# Patient Record
Sex: FEMALE | Race: WHITE | Employment: OTHER | URBAN - METROPOLITAN AREA
[De-identification: names, ages, dates, MRNs, and addresses within clinical notes are randomized per-mention and may not be internally consistent; named-entity substitution may affect disease eponyms.]

---

## 2017-01-27 DIAGNOSIS — R00.0 TACHYCARDIA: Primary | ICD-10-CM

## 2017-01-30 RX ORDER — DILTIAZEM HYDROCHLORIDE 240 MG/1
TABLET, EXTENDED RELEASE ORAL
Qty: 90 TABLET | Refills: 0 | Status: SHIPPED | OUTPATIENT
Start: 2017-01-30 | End: 2017-04-06

## 2017-02-06 ENCOUNTER — OFFICE VISIT (OUTPATIENT)
Dept: FAMILY MEDICINE | Facility: OTHER | Age: 47
End: 2017-02-06
Attending: NURSE PRACTITIONER
Payer: COMMERCIAL

## 2017-02-06 VITALS
WEIGHT: 251 LBS | HEIGHT: 65 IN | SYSTOLIC BLOOD PRESSURE: 122 MMHG | DIASTOLIC BLOOD PRESSURE: 74 MMHG | BODY MASS INDEX: 41.82 KG/M2 | HEART RATE: 82 BPM | RESPIRATION RATE: 14 BRPM | TEMPERATURE: 97.7 F

## 2017-02-06 DIAGNOSIS — M54.41 CHRONIC BILATERAL LOW BACK PAIN WITH BILATERAL SCIATICA: Primary | ICD-10-CM

## 2017-02-06 DIAGNOSIS — M51.26 LUMBAR DISC HERNIATION: ICD-10-CM

## 2017-02-06 DIAGNOSIS — M54.42 CHRONIC BILATERAL LOW BACK PAIN WITH BILATERAL SCIATICA: Primary | ICD-10-CM

## 2017-02-06 DIAGNOSIS — G89.29 CHRONIC BILATERAL LOW BACK PAIN WITH BILATERAL SCIATICA: Primary | ICD-10-CM

## 2017-02-06 DIAGNOSIS — M51.26 LUMBAR DISC HERNIATION: Primary | ICD-10-CM

## 2017-02-06 DIAGNOSIS — M54.50 LOW BACK PAIN: ICD-10-CM

## 2017-02-06 PROCEDURE — 99214 OFFICE O/P EST MOD 30 MIN: CPT | Performed by: NURSE PRACTITIONER

## 2017-02-06 NOTE — MR AVS SNAPSHOT
After Visit Summary   2/6/2017    Selma Dubose    MRN: [id number]           Patient Information     Date Of Birth          1970        Visit Information        Provider Department      2/6/2017 10:30 AM Nga Yousif NP [facility] [place]        Today's Diagnoses     Chronic bilateral low back pain with bilateral sciatica    -  1     Lumbar disc herniation           Care Instructions        1. Chronic bilateral low back pain with bilateral sciatica  2. Lumbar disc herniation  - MR Lumbar Spine w/o & w Contrast; Future  - MR Lumbar Spine w/o & w Contrast  - After MRI we can determine next course of action - injection vs neurosurgery consultation (last injection was at [facility], Dr. Conklin)  - Ice  - Anti-inflammatory PRN  - Topical Aspercreme with lidocaine          Nga Yousif -[facility]  [phone]                Follow-ups after your visit        Your next 10 appointments already scheduled     Feb 21, 2017 11:00 AM   (Arrive by 10:45 AM)   SHORT with Paz Maria MD   [facility] [place] ([facility] )    [address] [place] [address]   [phone]              Who to contact     If you have questions or need follow up information about today's clinic visit or your schedule please contact [facility] directly at [phone].  Normal or non-critical lab and imaging results will be communicated to you by MyChart, letter or phone within 4 business days after the clinic has received the results. If you do not hear from us within 7 days, please contact the clinic through MyChart or phone. If you have a critical or abnormal lab result, we will notify you by phone as soon as possible.  Submit refill requests through [employer] or call your pharmacy and they will forward the refill request to us. Please allow 3 business days for your refill to be completed.          Additional Information About Your Visit        MyChart Information     [employer]  "gives you secure access to your electronic health record. If you see a primary care provider, you can also send messages to your care team and make appointments. If you have questions, please call your primary care clinic.  If you do not have a primary care provider, please call [phone] and they will assist you.        Care EveryWhere ID     This is your Care EveryWhere ID. This could be used by other organizations to access your [place] medical records  [id number]        Your Vitals Were     Pulse Temperature Respirations Height BMI (Body Mass Index)       82 97.7  F (36.5  C) (Tympanic) 14 5' 5\" (1.651 m) 41.77 kg/m2        Blood Pressure from Last 3 Encounters:   02/06/17 122/74   10/09/15 130/78   10/09/15 132/80    Weight from Last 3 Encounters:   02/06/17 251 lb (113.853 kg)   10/09/15 232 lb (105.235 kg)   10/09/15 240 lb (108.863 kg)              We Performed the Following     MR Lumbar Spine w/o & w Contrast        Primary Care Provider Office Phone # Fax #    Paz Maria [phone]       [facility] [address] [place] [address]        Thank you!     Thank you for choosing [facility]  for your care. Our goal is always to provide you with excellent care. Hearing back from our patients is one way we can continue to improve our services. Please take a few minutes to complete the written survey that you may receive in the mail after your visit with us. Thank you!             Your Updated Medication List - Protect others around you: Learn how to safely use, store and throw away your medicines at www.disposemymeds.org.          This list is accurate as of: 2/6/17 11:21 AM.  Always use your most recent med list.                   Brand Name Dispense Instructions for use    albuterol 108 (90 BASE) MCG/ACT Inhaler   Generic drug:  albuterol     17 g    INHALE 2 PUFFS INTO THE LUNGS FOUR TIMES DAILY       ALPRAZolam 0.25 MG tablet    XANAX "    30 tablet    Take 1 tablet (0.25 mg) by mouth 3 times daily as needed for anxiety       diclofenac 75 MG EC tablet    VOLTAREN    90 tablet    Take 1 tablet (75 mg) by mouth 2 times daily as needed for moderate pain       levothyroxine 100 MCG tablet    SYNTHROID/LEVOTHROID    30 tablet    TAKE 1 TABLET BY MOUTH EVERY DAY. DUE FOR LAB WORK       MATZIM  MG Tb24   Generic drug:  diltiazem HCl COATED BEADS     90 tablet    TAKE 1 TABLET BY MOUTH DAILY       tiZANidine 4 MG tablet    ZANAFLEX    30 tablet    Take 1 tablet (4 mg) by mouth nightly as needed for muscle spasms

## 2017-02-06 NOTE — PROGRESS NOTES
SUBJECTIVE:  Selma Dubose is a 46 year old female   Chief Complaint   Patient presents with     Recheck Medication     Patient would like a referral for a back injection.       Active diagnoses this visit:  Lumbar spine pain    Onset- years  Timing - daily  Location  - lumbar - sciatica, both legs  Severity  - moderate  Duration - as above  Quality -  Dull, pressure, sharp  Settings  - any  Aggravating Factors -  Weather, sitting, ROM  Relieving factors -  no  Treatment to Date - PT, PMIR, Injection x1, moderately effective               MR OF THE LUMBAR SPINE - 2015     TECHNIQUE:  Images were obtained sagittally T1 STIR and T2-weighted;  axially proton density and T2-weighted.     REPORT:  There is decreased height in the T12-L1 disk, with  broad-based annular bulging.  The L1-L2 disk is normal height, without  evidence of disk herniation or protrusion.  Mild loss of vertical  height is seen at L2-L3 with some mild annular bulging, but no  significant thecal sac or nerve root compression  is noted.  At L3-L4  disk is normal height, without evidence of disk herniation or  protrusion.  There is severe facet joint degenerative changes at  L4-L5.  There is non-spondylolytic spondylolisthesis with forward  slippage of approximately 4 mm of L4 on L5.  There is broad-based  annular bulging, mildly flattening the ventral aspect of the  subarachnoid space.  At L5-S1 there is some broad-based annular  bulging noted.  Facet joints appear normal.  There is posterolateral  disk protrusions bilaterally, worse on the left than the right.  There  is mild compression of the left L5 nerve root in the L5-S1 neural  foramen.  There is no significant compression of the right L5 nerve  root.  Intradurally, the nerves of cauda equina and conus appear  normal. The paravertebral soft tissues appear normal.     IMPRESSION:  1.  SYMMETRIC DISK PROTRUSION FAR-LATERALLY L5-S1 ON THE LEFT,  COMPRESSING THE LEFT L5 NERVE ROOT IN THE NEURAL  FORAMEN.     2.  ADVANCED FACET JOINT DEGENERATIVE CHANGES L4-L5 WITH  NON-SPONDYLOLYTIC SPONDYLOLISTHESIS OF L4 ON L5.              Past Medical History   Diagnosis Date     Unspecified acquired hypothyroidism 07/27/2005     Nonallopathic lesion of cervical region, not elsewhere classified 08/18/2004     Chilari Malformation 12/09/2011     Brain aneurysm  12/09/2011     Hand pain, Left 12/09/2011     Anxiety      Obstructive sleep apnea 5/7/2013     Noncompliance with medication regimen 3/17/2015       Past Surgical History   Procedure Laterality Date     Clipping of right cerebral artery aneurysm       cerebral artery aneurysm,nelida type 1 syndrome,     Carpal tunnel release rt/lt       LEFT       Family History   Problem Relation Age of Onset     Alzheimer Disease Other      CANCER Other      LUNG     Other - See Comments Father 54     Accidental Head Injury     Respiratory Other      Emphysema     DIABETES Other      CANCER Other      LUNG       Social History   Substance Use Topics     Smoking status: Current Every Day Smoker -- 1.00 packs/day for 24 years     Types: Cigarettes     Smokeless tobacco: Never Used      Comment: Tried to Quit (NO); Passive Exposure (NO)     Alcohol Use: Yes      Comment: 3 Beers - WEEKLY       Current Outpatient Prescriptions   Medication     MATZIM  MG TB24     ALPRAZolam (XANAX) 0.25 MG tablet     levothyroxine (SYNTHROID,LEVOTHROID) 100 MCG tablet     ALBUTEROL 108 (90 BASE) MCG/ACT inhaler     diclofenac (VOLTAREN) 75 MG EC tablet     tiZANidine (ZANAFLEX) 4 MG tablet     No current facility-administered medications for this visit.        No Known Allergies    REVIEW OF SYSTEMS  Skin: negative  Eyes: negative  Ears/Nose/Throat: negative  Respiratory: No shortness of breath, dyspnea on exertion, cough, or hemoptysis  Cardiovascular: negative  Gastrointestinal: negative  Genitourinary: negative  Musculoskeletal: as above  Neurologic: negative  Psychiatric:  "negative  Hematologic/Lymphatic/Immunologic: negative      OBJECTIVE:  /74 mmHg  Pulse 82  Temp(Src) 97.7  F (36.5  C) (Tympanic)  Resp 14  Ht 5' 5\" (1.651 m)  Wt 251 lb (113.853 kg)  BMI 41.77 kg/m2  Constitutional: healthy, alert, no distress and cooperative  Head: Normocephalic. No masses, lesions, or tenderness  Neck: Neck supple. No adenopathy. Thyroid symmetric.  ENT: ENT exam unremarkable  Cardiovascular: PMI normal. No murmurs, clicks gallops or rub  Respiratory: negative, Percussion normal. Good diaphragmatic excursion. Lungs clear  Gastrointestinal: Abdomen soft, non-tender. BS normal. No masses, organomegaly  Musculoskeletal:  L/S spine pain - SI pain bilaterally.  Pain with ROM  Skin: no suspicious lesions or rashes  Neurologic: Gait normal. Sensation grossly WNL.  Psychiatric: mentation appears normal and affect normal/bright  Hematologic/Lymphatic/Immunologic: No adenopathy noted    Visit time:  25 Minutes  Time spent with patient, including examination, face to face patient education - 20mn  Visit Content: During our face to face time, patient education was provided regarding diagnosis, and treatment pan. Patient counseled regarding disease process  All diagnosis and treatment plan as above are reviewed with the patient.  Record review completed      1. Chronic bilateral low back pain with bilateral sciatica  2. Lumbar disc herniation  - MR Lumbar Spine w/o & w Contrast; Future  - MR Lumbar Spine w/o & w Contrast  - After MRI we can determine next course of action - injection vs neurosurgery consultation (last injection was at [facility], Dr. Conklin)  - Ice  - Anti-inflammatory PRN  - Topical Aspercreme with lidocaine          Nga GRAHAM  [phone]          "

## 2017-02-06 NOTE — NURSING NOTE
"Chief Complaint   Patient presents with     Recheck Medication     Patient would like a referral for a back injection.       Initial /74 mmHg  Pulse 82  Temp(Src) 97.7  F (36.5  C) (Tympanic)  Resp 14  Ht 5' 5\" (1.651 m)  Wt 251 lb (113.853 kg)  BMI 41.77 kg/m2 Estimated body mass index is 41.77 kg/(m^2) as calculated from the following:    Height as of this encounter: 5' 5\" (1.651 m).    Weight as of this encounter: 251 lb (113.853 kg).  Medication Reconciliation: complete   Luz Eldridge      "

## 2017-02-13 DIAGNOSIS — F41.9 ANXIETY: ICD-10-CM

## 2017-02-14 RX ORDER — ALPRAZOLAM 0.25 MG
TABLET ORAL
Qty: 30 TABLET | Refills: 2 | Status: SHIPPED | OUTPATIENT
Start: 2017-02-14 | End: 2017-03-16

## 2017-02-21 ENCOUNTER — OFFICE VISIT (OUTPATIENT)
Dept: FAMILY MEDICINE | Facility: OTHER | Age: 47
End: 2017-02-21
Attending: FAMILY MEDICINE
Payer: COMMERCIAL

## 2017-02-21 ENCOUNTER — HOSPITAL ENCOUNTER (OUTPATIENT)
Dept: MRI IMAGING | Facility: HOSPITAL | Age: 47
Discharge: HOME | End: 2017-02-21
Attending: NURSE PRACTITIONER | Admitting: NURSE PRACTITIONER
Payer: COMMERCIAL

## 2017-02-21 VITALS
DIASTOLIC BLOOD PRESSURE: 80 MMHG | RESPIRATION RATE: 15 BRPM | BODY MASS INDEX: 41.93 KG/M2 | HEART RATE: 93 BPM | WEIGHT: 252 LBS | TEMPERATURE: 97.7 F | SYSTOLIC BLOOD PRESSURE: 120 MMHG | OXYGEN SATURATION: 97 %

## 2017-02-21 DIAGNOSIS — G89.29 CHRONIC RIGHT SHOULDER PAIN: ICD-10-CM

## 2017-02-21 DIAGNOSIS — R00.0 TACHYCARDIA: ICD-10-CM

## 2017-02-21 DIAGNOSIS — F41.9 ANXIETY: ICD-10-CM

## 2017-02-21 DIAGNOSIS — E03.9 HYPOTHYROIDISM, UNSPECIFIED TYPE: Primary | ICD-10-CM

## 2017-02-21 DIAGNOSIS — M25.511 CHRONIC RIGHT SHOULDER PAIN: ICD-10-CM

## 2017-02-21 LAB
ANION GAP SERPL CALCULATED.3IONS-SCNC: 10 MMOL/L (ref 3–14)
BUN SERPL-MCNC: 9 MG/DL (ref 7–30)
CALCIUM SERPL-MCNC: 8.6 MG/DL (ref 8.5–10.1)
CHLORIDE SERPL-SCNC: 105 MMOL/L (ref 94–109)
CO2 SERPL-SCNC: 24 MMOL/L (ref 20–32)
CREAT SERPL-MCNC: 0.75 MG/DL (ref 0.52–1.04)
GFR SERPL CREATININE-BSD FRML MDRD: 82 ML/MIN/1.7M2
GLUCOSE SERPL-MCNC: 108 MG/DL (ref 70–99)
POTASSIUM SERPL-SCNC: 4.3 MMOL/L (ref 3.4–5.3)
SODIUM SERPL-SCNC: 139 MMOL/L (ref 133–144)
TSH SERPL DL<=0.05 MIU/L-ACNC: 6.16 MU/L (ref 0.4–4)

## 2017-02-21 PROCEDURE — 36415 COLL VENOUS BLD VENIPUNCTURE: CPT | Performed by: FAMILY MEDICINE

## 2017-02-21 PROCEDURE — 84443 ASSAY THYROID STIM HORMONE: CPT | Performed by: FAMILY MEDICINE

## 2017-02-21 PROCEDURE — 72148 MRI LUMBAR SPINE W/O DYE: CPT | Mod: TC

## 2017-02-21 PROCEDURE — 99214 OFFICE O/P EST MOD 30 MIN: CPT | Performed by: FAMILY MEDICINE

## 2017-02-21 PROCEDURE — 80048 BASIC METABOLIC PNL TOTAL CA: CPT | Performed by: FAMILY MEDICINE

## 2017-02-21 RX ORDER — DICLOFENAC SODIUM 75 MG/1
75 TABLET, DELAYED RELEASE ORAL 2 TIMES DAILY PRN
Qty: 90 TABLET | Refills: 1 | Status: SHIPPED | OUTPATIENT
Start: 2017-02-21 | End: 2017-04-28

## 2017-02-21 NOTE — MR AVS SNAPSHOT
After Visit Summary   2/21/2017    Selma Dubose    MRN: [id number]           Patient Information     Date Of Birth          1970        Visit Information        Provider Department      2/21/2017 11:00 AM Paz Maria MD [facility]        Today's Diagnoses     Hypothyroidism, unspecified type    -  1    Anxiety        Chronic right shoulder pain        Tachycardia           Follow-ups after your visit        Follow-up notes from your care team     Return in about 6 months (around 8/21/2017).      Who to contact     If you have questions or need follow up information about today's clinic visit or your schedule please contact [facility] directly at [phone].  Normal or non-critical lab and imaging results will be communicated to you by MyChart, letter or phone within 4 business days after the clinic has received the results. If you do not hear from us within 7 days, please contact the clinic through [employer] or phone. If you have a critical or abnormal lab result, we will notify you by phone as soon as possible.  Submit refill requests through [employer] or call your pharmacy and they will forward the refill request to us. Please allow 3 business days for your refill to be completed.          Additional Information About Your Visit        MyChart Information     [employer] gives you secure access to your electronic health record. If you see a primary care provider, you can also send messages to your care team and make appointments. If you have questions, please call your primary care clinic.  If you do not have a primary care provider, please call [phone] and they will assist you.        Care EveryWhere ID     This is your Care EveryWhere ID. This could be used by other organizations to access your [place] medical records  [id number]        Your Vitals Were     Pulse Temperature Respirations Pulse Oximetry BMI (Body Mass Index)       93 97.7  F (36.5  C)  (Tympanic) 15 97% 41.93 kg/m2        Blood Pressure from Last 3 Encounters:   02/21/17 120/80   02/06/17 122/74   10/09/15 130/78    Weight from Last 3 Encounters:   02/21/17 252 lb (114.3 kg)   02/06/17 251 lb (113.9 kg)   10/09/15 232 lb (105.2 kg)              We Performed the Following     Basic metabolic panel     TSH          Today's Medication Changes          These changes are accurate as of: 2/21/17 11:59 PM.  If you have any questions, ask your nurse or doctor.               These medicines have changed or have updated prescriptions.        Dose/Directions    levothyroxine 112 MCG tablet   Commonly known as:  SYNTHROID/LEVOTHROID   This may have changed:  See the new instructions.   Used for:  Hypothyroidism, unspecified type   Changed by:  Paz Maria MD        Dose:  112 mcg   Take 1 tablet (112 mcg) by mouth daily   Quantity:  90 tablet   Refills:  1            Where to get your medicines      These medications were sent to [facility] [address] [place] [address]  AT [facility] [address]  [address] [facility] [address]     Phone:  [phone]     diclofenac 75 MG EC tablet    levothyroxine 112 MCG tablet                Primary Care Provider Office Phone # Fax #    Paz Maria [phone]       [facility] [address] [facility] [address]        Thank you!     Thank you for choosing [facility]  for your care. Our goal is always to provide you with excellent care. Hearing back from our patients is one way we can continue to improve our services. Please take a few minutes to complete the written survey that you may receive in the mail after your visit with us. Thank you!             Your Updated Medication List - Protect others around you: Learn how to safely use, store and throw away your medicines at www.disposemymeds.org.          This list is accurate as of: 2/21/17 11:59 PM.  Always use  your most recent med list.                   Brand Name Dispense Instructions for use    albuterol 108 (90 BASE) MCG/ACT Inhaler   Generic drug:  albuterol     17 g    INHALE 2 PUFFS INTO THE LUNGS FOUR TIMES DAILY       ALPRAZolam 0.25 MG tablet    XANAX    30 tablet    TAKE 1 TABLET BY MOUTH THREE TIMES DAILY AS NEEDED FOR ANXIETY       diclofenac 75 MG EC tablet    VOLTAREN    90 tablet    Take 1 tablet (75 mg) by mouth 2 times daily as needed for moderate pain       levothyroxine 112 MCG tablet    SYNTHROID/LEVOTHROID    90 tablet    Take 1 tablet (112 mcg) by mouth daily       MATZIM  MG Tb24   Generic drug:  diltiazem HCl COATED BEADS     90 tablet    TAKE 1 TABLET BY MOUTH DAILY       tiZANidine 4 MG tablet    ZANAFLEX    30 tablet    Take 1 tablet (4 mg) by mouth nightly as needed for muscle spasms

## 2017-02-21 NOTE — NURSING NOTE
"Chief Complaint   Patient presents with     Thyroid Problem     side effects of generic synthroid needs name brand     Anxiety     Musculoskeletal Problem     med review       Initial /80 (BP Location: Left arm, Patient Position: Chair, Cuff Size: Adult Large)  Pulse 93  Temp 97.7  F (36.5  C) (Tympanic)  Resp 15  Wt 252 lb (114.3 kg)  SpO2 97%  BMI 41.93 kg/m2 Estimated body mass index is 41.93 kg/(m^2) as calculated from the following:    Height as of 2/6/17: 5' 5\" (1.651 m).    Weight as of this encounter: 252 lb (114.3 kg).  Medication Reconciliation: complete     Milvia Robbins      "

## 2017-02-22 ENCOUNTER — TELEPHONE (OUTPATIENT)
Dept: FAMILY MEDICINE | Facility: OTHER | Age: 47
End: 2017-02-22

## 2017-02-22 DIAGNOSIS — R93.7 ABNORMAL MRI, LUMBAR SPINE: Primary | ICD-10-CM

## 2017-02-22 DIAGNOSIS — M54.40 BILATERAL LOW BACK PAIN WITH SCIATICA, SCIATICA LATERALITY UNSPECIFIED, UNSPECIFIED CHRONICITY: ICD-10-CM

## 2017-02-24 RX ORDER — LEVOTHYROXINE SODIUM 112 UG/1
112 TABLET ORAL DAILY
Qty: 90 TABLET | Refills: 1 | Status: SHIPPED | OUTPATIENT
Start: 2017-02-24 | End: 2017-10-16

## 2017-02-24 NOTE — PROGRESS NOTES
SUBJECTIVE:                                                    Selma Dubose is a 46 year old female who presents to clinic today for the following health issues:    Anxiety Follow-Up    Status since last visit: Improved since she started working from home    Other associated symptoms:None    Complicating factors:   Significant life event: No   Current substance abuse: None  Depression symptoms: No  No flowsheet data found.     GAD7   No flowsheet data found.       PHQ-9 SCORE 5/30/2013 7/31/2015   Total Score 12 16        Hypothyroidism Follow-up      Since last visit, patient describes the following symptoms: Weight stable, no hair loss, no skin changes, no constipation, no loose stools       Patient does also need refills of NSAID and labs updated.        Problem list and histories reviewed & adjusted, as indicated.  Additional history: as documented    Patient Active Problem List   Diagnosis     Hypothyroidism     Anxiety     Tachycardia     Obstructive sleep apnea     Tobacco dependence     Spina bifida with hydrocephalus (H)     Brain aneurysm      Worsening headaches     Noncompliance with medication regimen     Past Surgical History   Procedure Laterality Date     Clipping of right cerebral artery aneurysm       cerebral artery aneurysm,nelida type 1 syndrome,     Carpal tunnel release rt/lt       LEFT       Social History   Substance Use Topics     Smoking status: Current Every Day Smoker     Packs/day: 1.00     Years: 24.00     Types: Cigarettes     Smokeless tobacco: Never Used      Comment: Tried to Quit (NO); Passive Exposure (NO)     Alcohol use Yes      Comment: 3 Beers - WEEKLY     Family History   Problem Relation Age of Onset     Other - See Comments Father 54     Accidental Head Injury     Alzheimer Disease Other      DIABETES Other      CANCER Other      LUNG     Respiratory Other      Emphysema     CANCER Other      LUNG         Current Outpatient Prescriptions   Medication Sig Dispense  Refill     levothyroxine (SYNTHROID/LEVOTHROID) 112 MCG tablet Take 1 tablet (112 mcg) by mouth daily 90 tablet 1     diclofenac (VOLTAREN) 75 MG EC tablet Take 1 tablet (75 mg) by mouth 2 times daily as needed for moderate pain 90 tablet 1     ALPRAZolam (XANAX) 0.25 MG tablet TAKE 1 TABLET BY MOUTH THREE TIMES DAILY AS NEEDED FOR ANXIETY 30 tablet 2     MATZIM  MG TB24 TAKE 1 TABLET BY MOUTH DAILY 90 tablet 0     ALBUTEROL 108 (90 BASE) MCG/ACT inhaler INHALE 2 PUFFS INTO THE LUNGS FOUR TIMES DAILY 17 g 2     tiZANidine (ZANAFLEX) 4 MG tablet Take 1 tablet (4 mg) by mouth nightly as needed for muscle spasms 30 tablet 1     [DISCONTINUED] levothyroxine (SYNTHROID,LEVOTHROID) 100 MCG tablet TAKE 1 TABLET BY MOUTH EVERY DAY. DUE FOR LAB WORK 30 tablet 0     No Known Allergies    ROS:  Constitutional, HEENT, cardiovascular, pulmonary, gi and gu systems are negative, except as otherwise noted.    OBJECTIVE:                                                    /80 (BP Location: Left arm, Patient Position: Chair, Cuff Size: Adult Large)  Pulse 93  Temp 97.7  F (36.5  C) (Tympanic)  Resp 15  Wt 252 lb (114.3 kg)  SpO2 97%  BMI 41.93 kg/m2  Body mass index is 41.93 kg/(m^2).  GENERAL: healthy, alert and no distress  PSYCH: mentation appears normal, affect normal/bright    Diagnostic Test Results:  none      ASSESSMENT/PLAN:                                                      1. Hypothyroidism, unspecified type  Labs updated and refills sent.  Follow-up in six months, sooner as directed.  - TSH  - levothyroxine (SYNTHROID/LEVOTHROID) 112 MCG tablet; Take 1 tablet (112 mcg) by mouth daily  Dispense: 90 tablet; Refill: 1    2. Anxiety  No changes at this time.    3. Chronic right shoulder pain  Refilled.  - diclofenac (VOLTAREN) 75 MG EC tablet; Take 1 tablet (75 mg) by mouth 2 times daily as needed for moderate pain  Dispense: 90 tablet; Refill: 1    4. Tachycardia  Refilled.  - Basic metabolic panel        Paz  St Papa MD  [facility]

## 2017-03-13 DIAGNOSIS — F41.9 ANXIETY: ICD-10-CM

## 2017-03-14 RX ORDER — ALPRAZOLAM 0.25 MG
TABLET ORAL
Qty: 30 TABLET | Refills: 0 | OUTPATIENT
Start: 2017-03-14

## 2017-04-06 DIAGNOSIS — R00.0 TACHYCARDIA: ICD-10-CM

## 2017-04-06 NOTE — TELEPHONE ENCOUNTER
MATZIM      Last Written Prescription Date:  1/30/17  Last Fill Quantity: 90,   # refills: 0  Last Office Visit with [facility] prescribing provider: 2/21/17  Future Office visit:       Routing refill request to provider for review/approval because:  Drug not on the [facility] refill protocol or controlled substance

## 2017-04-14 DIAGNOSIS — R00.0 TACHYCARDIA: ICD-10-CM

## 2017-04-14 NOTE — TELEPHONE ENCOUNTER
Kylezim      Last Written Prescription Date: 4/6/17  Last Fill Quantity: 90,  # refills: 0   Last Office Visit with [facility] or [facility] prescribing provider: 2/21/17

## 2017-04-28 ENCOUNTER — OFFICE VISIT (OUTPATIENT)
Dept: FAMILY MEDICINE | Facility: OTHER | Age: 47
End: 2017-04-28
Attending: NURSE PRACTITIONER
Payer: COMMERCIAL

## 2017-04-28 VITALS
WEIGHT: 220 LBS | SYSTOLIC BLOOD PRESSURE: 124 MMHG | HEART RATE: 80 BPM | RESPIRATION RATE: 14 BRPM | TEMPERATURE: 98.2 F | BODY MASS INDEX: 36.65 KG/M2 | DIASTOLIC BLOOD PRESSURE: 78 MMHG | HEIGHT: 65 IN

## 2017-04-28 DIAGNOSIS — M54.42 MIDLINE LOW BACK PAIN WITH LEFT-SIDED SCIATICA, UNSPECIFIED CHRONICITY: Primary | ICD-10-CM

## 2017-04-28 DIAGNOSIS — M48.061 SPINAL STENOSIS OF LUMBAR REGION: ICD-10-CM

## 2017-04-28 DIAGNOSIS — M47.26 OSTEOARTHRITIS OF SPINE WITH RADICULOPATHY, LUMBAR REGION: ICD-10-CM

## 2017-04-28 DIAGNOSIS — M54.17 LUMBOSACRAL RADICULOPATHY AT S1: ICD-10-CM

## 2017-04-28 PROBLEM — M54.40 ACUTE MIDLINE LOW BACK PAIN WITH SCIATICA: Status: ACTIVE | Noted: 2017-04-28

## 2017-04-28 PROCEDURE — 99214 OFFICE O/P EST MOD 30 MIN: CPT | Performed by: NURSE PRACTITIONER

## 2017-04-28 RX ORDER — CYCLOBENZAPRINE HCL 10 MG
10 TABLET ORAL 2 TIMES DAILY PRN
Qty: 60 TABLET | Refills: 1 | Status: SHIPPED | OUTPATIENT
Start: 2017-04-28 | End: 2017-08-14

## 2017-04-28 RX ORDER — IBUPROFEN 600 MG/1
600 TABLET, FILM COATED ORAL EVERY 8 HOURS PRN
Qty: 90 TABLET | Refills: 1 | Status: SHIPPED | OUTPATIENT
Start: 2017-04-28 | End: 2017-08-03

## 2017-04-28 ASSESSMENT — PAIN SCALES - GENERAL: PAINLEVEL: EXTREME PAIN (8)

## 2017-04-28 NOTE — PATIENT INSTRUCTIONS
1. Midline low back pain with left-sided sciatica, unspecified chronicity  - cyclobenzaprine (FLEXERIL) 10 MG tablet; Take 1 tablet (10 mg) by mouth 2 times daily as needed for muscle spasms  Dispense: 60 tablet; Refill: 1  - acetaminophen-codeine (TYLENOL #3) 300-30 MG per tablet; 1-2 po TID PRN  Dispense: 60 tablet; Refill: 1  - ibuprofen (ADVIL/MOTRIN) 600 MG tablet; Take 1 tablet (600 mg) by mouth every 8 hours as needed for moderate pain  Dispense: 90 tablet; Refill: 1    2. Osteoarthritis of spine with radiculopathy, lumbar region  - cyclobenzaprine (FLEXERIL) 10 MG tablet; Take 1 tablet (10 mg) by mouth 2 times daily as needed for muscle spasms  Dispense: 60 tablet; Refill: 1  - acetaminophen-codeine (TYLENOL #3) 300-30 MG per tablet; 1-2 po TID PRN  Dispense: 60 tablet; Refill: 1  - ibuprofen (ADVIL/MOTRIN) 600 MG tablet; Take 1 tablet (600 mg) by mouth every 8 hours as needed for moderate pain  Dispense: 90 tablet; Refill: 1    3. Lumbosacral radiculopathy at S1  - cyclobenzaprine (FLEXERIL) 10 MG tablet; Take 1 tablet (10 mg) by mouth 2 times daily as needed for muscle spasms  Dispense: 60 tablet; Refill: 1  - acetaminophen-codeine (TYLENOL #3) 300-30 MG per tablet; 1-2 po TID PRN  Dispense: 60 tablet; Refill: 1  - ibuprofen (ADVIL/MOTRIN) 600 MG tablet; Take 1 tablet (600 mg) by mouth every 8 hours as needed for moderate pain  Dispense: 90 tablet; Refill: 1    4. Spinal stenosis of lumbar region  - cyclobenzaprine (FLEXERIL) 10 MG tablet; Take 1 tablet (10 mg) by mouth 2 times daily as needed for muscle spasms  Dispense: 60 tablet; Refill: 1  - acetaminophen-codeine (TYLENOL #3) 300-30 MG per tablet; 1-2 po TID PRN  Dispense: 60 tablet; Refill: 1  - ibuprofen (ADVIL/MOTRIN) 600 MG tablet; Take 1 tablet (600 mg) by mouth every 8 hours as needed for moderate pain  Dispense: 90 tablet; Refill: 1        Ice or heat for comfort  Epsom salt baths  Asper cream with lidocaine OTC per package instruction  DC  diclofenac  DC norflex  To UC or ER with increased symptoms  See neurosurgery as arranged  FU here as needed         [facility]  [phone]

## 2017-04-28 NOTE — PROGRESS NOTES
SUBJECTIVE:  Selma Dubose is a 46 year old female   Chief Complaint   Patient presents with     Back Pain     Upcoming neurosurgery       Active diagnoses this visit:  Lumbar spine pain, upcoming surgery anticipated at [facility] in [place], date to be confirmed for consultation and further management.     Onset- increasing over 1 year  Timing - daily  Location  - lumbar, radiates to LLE  Severity  - severe  Duration - as above  Quality -  Sharp, aching  Settings  - all, increases with extended sitting. Prolonged standing, Weakness LLE  Aggravating Factors -  Back stiffness, increased pain with walking and sitting  Relieving factors -  Not to date  Treatment to Date - medication, PT, Steroid injection, stretches, Ice, heat    Failed PT and other non-surgical interventions, and symptoms continue to increase.      She has seen Dr Rodriguez, neurosurgeon, who definitely feels she is a surgical candidate, however her hospital stay and care with him would be out of network.          Please see attached evaluation note from Dr. Rodriguez dated 3/30/17      Prateek Rodriguez MD - 03/30/2017 11:00 AM CDT  Formatting of this note may be different from the original.  Neurosurgery Consult   [facility]  3/30/2017  STEF Norton, CNP     Reason for Consult: Low back pain  Referring provider: Nga Yousif     HPI: Selma Dubose is a 46-year-old female who was referred to the neurosurgery office further evaluation and treatment of the above complaints. I am seeing her in collaboration with Dr. Elvis Rodriguez who also interviewed and examined the patient.  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 Symptoms have acutely worsened and now involves both legs to the knee over the past year.  Location: Across the low back and into both groin and down the upper leg to the knee.  Quality: Very  sharp  Radiating symptoms: Weakness of the legs worse on left  Aggravating Factors: All activity  Relieving Factors: Patient got 6 months of relief from epidural steroid injection  Treatments previously tried: Physical therapy, epidural steroid injection, Advil, heat and ice  Impact on activities of daily living: Ginger cannot walk for more than 20 minutes or stand for more than 5. She can sit for about an hour but all activities are made difficult due to the pain and weakness.    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     Cerebral aneurysm, nonruptured 04/28/2005   S/P Surgery     Hypothyroidism 11/26/2004   Diagnosed in Fall of 2004     Panic disorder without agoraphobia 10/29/2004     Thyrotoxicosis without mention of goiter or other cause, without mention of thyrotoxic crisis or storm 11/26/2004     Unspecified hearing loss 01/03/2005   Sensorineural , right ear     Past Surgical History  Past Surgical History:   Procedure Laterality Date     ANEURYSM, INTRACRAN, SIMPLE SURG 01/24/2005   Clipping of the midle cerebral artery aneurysm, right side     ECG MONITOR/COMPLETE 12/06/2004     Medications  Current Outpatient Prescriptions   Medication Sig     clindamycin (CLEOCIN-T) 1 % lotion APPLY TOPICALLY TWICE DAILY TO PREVENT OUTBREAKS     levothyroxine (SYNTHROID) 100 MCG tablet Take 1 Tab by mouth one time a day.     mupirocin (BACTROBAN) 2 % ointment Apply topically two times a day. To really crusted areas     minocycline (MINOCIN, DYNACIN) 100 MG capsule Take 1 Cap by mouth two times a day. Take with adequate amounts of water.     albuterol HFA (VENTOLIN HFA) 108 (90 BASE) MCG/ACT IN AERS 2 puffs four times daily as needed     ALPRAZolam (XANAX) 0.25 MG OR TABS take one tablet daily as needed     DILTIAZEM HCL COATED BEADS 240 MG OR CP24 1 capsule once daily     No current  "facility-administered medications for this visit.     Allergies  No Known Allergies    Social History  Social History   Substance Use Topics     Smoking status: Current Every Day Smoker   Packs/day: 1.00   Years: 16.00   Types: Cigarettes     Smokeless tobacco: None     Alcohol use 0.0 oz/week   0 Standard drinks or equivalent per week   Comment: occas     Family History  Family History   Problem Relation Age of Onset     Musculo-skeletal Disease Mother   Arthritis     Review of Systems  Skin: no complaints  Eyes: no complaints  Ears/Nose/Throat: no complaints  Respiratory: no complaints  Cardiovascular: no complaints  Gastrointestinal: no complaints  Genitourinary: no complaints  Musculoskeletal: As per history of present illness. Selma also states that she has \"bad knees\" and also impacted difficulty she has with walking.  Neurologic: As per history of present illness  Psychiatric: no complaints  Hematologic/Lymphatic/Immunologic: no complaints  Endocrine: no complaints    Vital Signs  Vitals:   03/30/17 1101   BP: 128/76   Pulse: 80   Height: 1.6 m (5' 3\")   Weight: 110.2 kg (243 lb)   BMI (Calculated): 43.05     Physical Exam:  APPEARANCE: alert and in no apparent distress.  BACK/SPINE: . No spinal tenderness over lumbar vertebrae or paraspinal muscle spasm/tenderness evident.  MOTOR: Strength of right lower extremity 4/5 and left lower extremity 5 over 5  EXTREMITIES: normal  NEUROLOGIC: DTRs 2+, decreased sensation L4 dermatome on the right and S1 dermatome on the left.    Imaging  I have personally reviewed and discussed in collaboration with Dr. Rashawn Rodriguez results the MRI and flexion and extension x-rays of the lumbar spine. I also reviewed the films with Selma. The MRI showed central canal stenosis at L4 5 and left neuroforaminal stenosis at L5-S1 due to degenerative change and large and a disc osteophyte complex respectively. The flexion and extension x-rays did show significant movement at the L4 5 " level.    Assessment/Plan  (M48.06) Spinal stenosis of lumbar region with neurogenic claudication (primary encounter diagnosis)  (M43.16) Spondylolisthesis of lumbar region    Dr. Rodriguez spoke with change or has offered her an L4 5 and L5-S1 TLIF for decompression of the lumbar spine patient has agreed to proceed. Please see surgeons detailed treatment no for discussion.     STEF Norton, CNP     I have seen and examined Selma with MARIELY Field and agree with her history and physical as stated. The patient has back and leg pain consistent with an L5 radicular pattern. MRI reveals L4-5 spondylolisthesis with canal stenosis and severe degeneration of the disc at L5-S1 resulting in bilateral foraminal narrowing. I have offered the patient an L4-5 and L5-S1 tlif. Risks discussed include bleeding, infection, damage to the nerves/cord, csf leak, death, etc. 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     Hand pain, Left 12/09/2011     Lumbosacral radiculopathy at S1 4/28/2017     Nonallopathic lesion of cervical region, not elsewhere classified 08/18/2004     Noncompliance with medication regimen 3/17/2015     Obstructive sleep apnea 5/7/2013     Osteoarthritis of spine with radiculopathy, lumbar region 4/28/2017     Spinal stenosis of lumbar region 4/28/2017     Unspecified acquired hypothyroidism 07/27/2005       Past Surgical History:   Procedure Laterality Date     CARPAL TUNNEL RELEASE RT/LT      LEFT     clipping of right cerebral artery aneurysm      cerebral artery aneurysm,nelida type 1 syndrome,       Family History   Problem Relation Age of Onset     Other - See Comments Father 54     Accidental Head Injury     Alzheimer Disease Other      DIABETES Other      CANCER Other      LUNG     Respiratory Other      Emphysema     CANCER Other      LUNG       Social  "History   Substance Use Topics     Smoking status: Current Every Day Smoker     Packs/day: 1.00     Years: 24.00     Types: Cigarettes     Smokeless tobacco: Never Used      Comment: Tried to Quit (NO); Passive Exposure (NO)     Alcohol use Yes      Comment: 3 Beers - WEEKLY       Current Outpatient Prescriptions   Medication     cyclobenzaprine (FLEXERIL) 10 MG tablet     acetaminophen-codeine (TYLENOL #3) 300-30 MG per tablet     ibuprofen (ADVIL/MOTRIN) 600 MG tablet     diltiazem HCl COATED BEADS (MATZIM LA) 240 MG TB24     ALPRAZolam (XANAX) 0.25 MG tablet     levothyroxine (SYNTHROID/LEVOTHROID) 112 MCG tablet     ALBUTEROL 108 (90 BASE) MCG/ACT inhaler     No current facility-administered medications for this visit.         No Known Allergies    REVIEW OF SYSTEMS  Skin: negative  Eyes: negative  Ears/Nose/Throat: negative  Respiratory: No shortness of breath, dyspnea on exertion, cough, or hemoptysis  Cardiovascular: negative  Gastrointestinal: negative  Genitourinary: negative  Musculoskeletal: as above  Neurologic: LLE radiculopathy  Psychiatric: anxiety  Hematologic/Lymphatic/Immunologic: negative      OBJECTIVE:  /78 (BP Location: Right arm, Patient Position: Chair, Cuff Size: Adult Regular)  Pulse 80  Temp 98.2  F (36.8  C) (Oral)  Resp 14  Ht 5' 5\" (1.651 m)  Wt 220 lb (99.8 kg)  LMP 04/02/2017  BMI 36.61 kg/m2     Constitutional: healthy, alert, cooperative, but uncomfortable  Neck: Neck supple. No adenopathy. Thyroid symmetric.  ENT: ENT exam unremarkable  Cardiovascular: PMI normal. No murmurs, clicks gallops or rub  Respiratory: negative, Percussion normal. Good diaphragmatic excursion. Lungs clear  Musculoskeletal: LS paraspinal tenderness, spasm - left SI joint pain, radiates to foot   Skin: no suspicious lesions or rashes  Neurologic: Gait normal. Sensation grossly WNL.  Psychiatric: mentation appears normal and affect normal/bright other than frustration due to discomfort, " understandably    XR LUMBAR SP FLEX EXT OR BEND VIEWS ONLY3/30/2017  [facility]  Result Narrative   This document is currently in Final Status    Exam Accession# [id number]    XR LUMBAR SP FLEX EXT OR BEND VIEWS ONLY    HISTORY: LBP.     COMPARISON: 01/27/2016.     IMPRESSION: 1 cm spondylolisthesis of L4 on L5 with flexion which partially reduces to 7 mm with extension. No additional subluxation with flexion and extension positioning. Advanced L5-S1 disc degeneration noted. Advanced lower lumbar facet degenerative   changes.       Dictated By: Keyon Alfaro MD 3/30/2017 1:27 PM  Edited By: CA 3/30/2017 1:31 PM    Electronically Signed: Keyon Alfaro MD 3/30/2017 5:04 PM            MRI OF LUMBAR SPINE     CLINICAL HISTORY: A 46-year-old female with chronic history of low  back pain and intermittent right radiculopathy.     COMPARISON: Today's study is compared to a prior MRI of the lumbar  spine which is dated November 4, 2015.     FINDINGS: Grade 1 degenerative subluxation of L4 relative to L5 is  similar in severity.     Conus medullaris is seen at T12-L1 and is normal. Mild annular  bulging within the disk at T11-T12 and T12-L1 are similar.     L1-L2 again demonstrates a normally contoured disk with minimal  endplate and facet joint degeneration.     L2-L3: Mild disk degeneration with minimal endplate and facet joint  degenerative changes.     L3-L4: Mild disk desiccation with slight bulge, which are similar.  Small synovial cyst is again seen along the dorsomedial aspect of the  right facet joint.     L4-L5: Moderate disk degeneration with slight chronic bulge. Severe  facet joint degenerative changes result in grade 1 subluxation of L4  relative to L5. The neural foramina are moderately narrowed,  asymmetrically worse on the left, with left L4 ganglionic  encroachment. The central canal is also mildly narrowed.     L5-S1: Severe disk degeneration with chronic bulge and endplate  osteophytic spurring are  again seen. Left dorsolateral disk  osteophyte complex appears to be increased slightly in size compared  to the prior examination, contributing to left L5 ganglionic abutment.  Annular fissuring is again seen along the left parasagittal aspect of  the disk. There is slight degenerative change seen about the facet  joints.     Mild atrophy of the paraspinous musculature is similar in appearance.  Cortical cyst of the right kidney is also similar.     IMPRESSION:    1. SLIGHT WORSENING OF SEVERE BILATERAL FACET JOINT DEGENERATION AT  L4-L5, AGAIN CONTRIBUTING TO DEGENERATIVE SUBLUXATION OF L4 RELATIVE  TO L5, IN ADDITION TO MILD TO MODERATE BILATERAL FORAMINAL STENOSIS.  NO EVIDENCE OF NERVE ROOT COMPRESSION.     2. MILD TO MODERATE CENTRAL CANAL STENOSIS AT L4-L5 SECONDARY TO  DEGENERATIVE SUBLUXATION, LIGAMENTUM FLAVUM HYPERTROPHY, AND  HYPERTROPHIC DEGENERATIVE CHANGES AT THE L4-L5 FACET JOINTS.     3. CHRONIC BULGE WITH LEFT PARASAGITTAL TO LEFT DORSOLATERAL DISK  OSTEOPHYTE COMPLEX, IMPINGING UPON THE LEFT VENTRAL LATERAL ASPECT OF  THE THECAL SAC AND THE EXITING LEFT S1 NERVE ROOT. CORRELATE FOR LEFT  S1 RADICULOPATHY.     4. SLIGHT INTERVAL ENLARGEMENT OF A DISK OSTEOPHYTE COMPLEX ALONG THE  LEFT DORSOLATERAL ASPECT AT L5-S1, NOW CONTRIBUTING TO WORSENING MASS  EFFECT UPON THE LEFT L5 DORSAL ROOT GANGLION. NO EVIDENCE OF  COMPRESSION. ANNULAR FISSURING        SIGNATURE PAGE ONLY  Exam Date: Feb 21, 2017 01:08:53 PM  Author: BISHNU RICKETTS  This report is final and signed      1. Midline low back pain with left-sided sciatica, unspecified chronicity  - cyclobenzaprine (FLEXERIL) 10 MG tablet; Take 1 tablet (10 mg) by mouth 2 times daily as needed for muscle spasms  Dispense: 60 tablet; Refill: 1  - acetaminophen-codeine (TYLENOL #3) 300-30 MG per tablet; 1-2 po TID PRN  Dispense: 60 tablet; Refill: 1  - ibuprofen (ADVIL/MOTRIN) 600 MG tablet; Take 1 tablet (600 mg) by mouth every 8 hours as needed for moderate  pain  Dispense: 90 tablet; Refill: 1    2. Osteoarthritis of spine with radiculopathy, lumbar region  - cyclobenzaprine (FLEXERIL) 10 MG tablet; Take 1 tablet (10 mg) by mouth 2 times daily as needed for muscle spasms  Dispense: 60 tablet; Refill: 1  - acetaminophen-codeine (TYLENOL #3) 300-30 MG per tablet; 1-2 po TID PRN  Dispense: 60 tablet; Refill: 1  - ibuprofen (ADVIL/MOTRIN) 600 MG tablet; Take 1 tablet (600 mg) by mouth every 8 hours as needed for moderate pain  Dispense: 90 tablet; Refill: 1    3. Lumbosacral radiculopathy at S1  - cyclobenzaprine (FLEXERIL) 10 MG tablet; Take 1 tablet (10 mg) by mouth 2 times daily as needed for muscle spasms  Dispense: 60 tablet; Refill: 1  - acetaminophen-codeine (TYLENOL #3) 300-30 MG per tablet; 1-2 po TID PRN  Dispense: 60 tablet; Refill: 1  - ibuprofen (ADVIL/MOTRIN) 600 MG tablet; Take 1 tablet (600 mg) by mouth every 8 hours as needed for moderate pain  Dispense: 90 tablet; Refill: 1    4. Spinal stenosis of lumbar region  - cyclobenzaprine (FLEXERIL) 10 MG tablet; Take 1 tablet (10 mg) by mouth 2 times daily as needed for muscle spasms  Dispense: 60 tablet; Refill: 1  - acetaminophen-codeine (TYLENOL #3) 300-30 MG per tablet; 1-2 po TID PRN  Dispense: 60 tablet; Refill: 1  - ibuprofen (ADVIL/MOTRIN) 600 MG tablet; Take 1 tablet (600 mg) by mouth every 8 hours as needed for moderate pain  Dispense: 90 tablet; Refill: 1        Ice or heat for comfort  Epsom salt baths  Asper cream with lidocaine OTC per package instruction  DC diclofenac  DC norflex  To UC or ER with increased symptoms  See neurosurgery as arranged  FU here as needed         Nga BEACHBRAYDEN  [phone]

## 2017-04-28 NOTE — LETTER
[facility]  [address]  [place] [address]  Phone: [phone]    2017        [place]ma [place] [id number]          Attention Brenda Chan  BRIANDA /   Emanuel  Fax - [phone]    RE: Selma Dubose -  [birth year]    Please see the attached note for Selma from today's office visit.  Her note details her evaluation to date, and plan of care.  Her claim is pending approval, if there is any further information we can  Provide to expedite this process, please let me know.    Thank you in advance for your assistance with Selma's case    Please contact me for questions or concerns.        Sincerely,        MARY Lewis    Attachment:  Clinic note dated 17

## 2017-04-28 NOTE — MR AVS SNAPSHOT
After Visit Summary   4/28/2017    Selma Dubose    MRN: [id number]           Patient Information     Date Of Birth          1970        Visit Information        Provider Department      4/28/2017 1:30 PM Nga Yousif NP [facility]        Today's Diagnoses     Midline low back pain with left-sided sciatica, unspecified chronicity    -  1    Osteoarthritis of spine with radiculopathy, lumbar region        Lumbosacral radiculopathy at S1        Spinal stenosis of lumbar region          Care Instructions        1. Midline low back pain with left-sided sciatica, unspecified chronicity  - cyclobenzaprine (FLEXERIL) 10 MG tablet; Take 1 tablet (10 mg) by mouth 2 times daily as needed for muscle spasms  Dispense: 60 tablet; Refill: 1  - acetaminophen-codeine (TYLENOL #3) 300-30 MG per tablet; 1-2 po TID PRN  Dispense: 60 tablet; Refill: 1  - ibuprofen (ADVIL/MOTRIN) 600 MG tablet; Take 1 tablet (600 mg) by mouth every 8 hours as needed for moderate pain  Dispense: 90 tablet; Refill: 1    2. Osteoarthritis of spine with radiculopathy, lumbar region  - cyclobenzaprine (FLEXERIL) 10 MG tablet; Take 1 tablet (10 mg) by mouth 2 times daily as needed for muscle spasms  Dispense: 60 tablet; Refill: 1  - acetaminophen-codeine (TYLENOL #3) 300-30 MG per tablet; 1-2 po TID PRN  Dispense: 60 tablet; Refill: 1  - ibuprofen (ADVIL/MOTRIN) 600 MG tablet; Take 1 tablet (600 mg) by mouth every 8 hours as needed for moderate pain  Dispense: 90 tablet; Refill: 1    3. Lumbosacral radiculopathy at S1  - cyclobenzaprine (FLEXERIL) 10 MG tablet; Take 1 tablet (10 mg) by mouth 2 times daily as needed for muscle spasms  Dispense: 60 tablet; Refill: 1  - acetaminophen-codeine (TYLENOL #3) 300-30 MG per tablet; 1-2 po TID PRN  Dispense: 60 tablet; Refill: 1  - ibuprofen (ADVIL/MOTRIN) 600 MG tablet; Take 1 tablet (600 mg) by mouth every 8 hours as needed for moderate pain  Dispense: 90 tablet; Refill: 1    4.  Spinal stenosis of lumbar region  - cyclobenzaprine (FLEXERIL) 10 MG tablet; Take 1 tablet (10 mg) by mouth 2 times daily as needed for muscle spasms  Dispense: 60 tablet; Refill: 1  - acetaminophen-codeine (TYLENOL #3) 300-30 MG per tablet; 1-2 po TID PRN  Dispense: 60 tablet; Refill: 1  - ibuprofen (ADVIL/MOTRIN) 600 MG tablet; Take 1 tablet (600 mg) by mouth every 8 hours as needed for moderate pain  Dispense: 90 tablet; Refill: 1        Ice or heat for comfort  Epsom salt baths  Asper cream with lidocaine OTC per package instruction  DC diclofenac  DC norflex  To UC or ER with increased symptoms  See neurosurgery as arranged  FU here as needed         Nga HERNANDEZ  [phone]              Follow-ups after your visit        Who to contact     If you have questions or need follow up information about today's clinic visit or your schedule please contact [facility] directly at [phone].  Normal or non-critical lab and imaging results will be communicated to you by DivXhart, letter or phone within 4 business days after the clinic has received the results. If you do not hear from us within 7 days, please contact the clinic through Starteed or phone. If you have a critical or abnormal lab result, we will notify you by phone as soon as possible.  Submit refill requests through Starteed or call your pharmacy and they will forward the refill request to us. Please allow 3 business days for your refill to be completed.          Additional Information About Your Visit        Starteed Information     Starteed gives you secure access to your electronic health record. If you see a primary care provider, you can also send messages to your care team and make appointments. If you have questions, please call your primary care clinic.  If you do not have a primary care provider, please call [phone] and they will assist you.        Care EveryWhere ID     This is your Care EveryWhere ID. This could be used by  "other organizations to access your [facility] medical records  [id number]        Your Vitals Were     Pulse Temperature Respirations Height Last Period BMI (Body Mass Index)    80 98.2  F (36.8  C) (Oral) 14 5' 5\" (1.651 m) 04/02/2017 36.61 kg/m2       Blood Pressure from Last 3 Encounters:   04/28/17 124/78   02/21/17 120/80   02/06/17 122/74    Weight from Last 3 Encounters:   04/28/17 220 lb (99.8 kg)   02/21/17 252 lb (114.3 kg)   02/06/17 251 lb (113.9 kg)              Today, you had the following     No orders found for display         Today's Medication Changes          These changes are accurate as of: 4/28/17  2:12 PM.  If you have any questions, ask your nurse or doctor.               Start taking these medicines.        Dose/Directions    acetaminophen-codeine 300-30 MG per tablet   Commonly known as:  TYLENOL #3   Used for:  Midline low back pain with left-sided sciatica, unspecified chronicity, Osteoarthritis of spine with radiculopathy, lumbar region, Lumbosacral radiculopathy at S1, Spinal stenosis of lumbar region   Started by:  Nga Yousif NP        1-2 po TID PRN   Quantity:  60 tablet   Refills:  1       cyclobenzaprine 10 MG tablet   Commonly known as:  FLEXERIL   Used for:  Midline low back pain with left-sided sciatica, unspecified chronicity, Osteoarthritis of spine with radiculopathy, lumbar region, Lumbosacral radiculopathy at S1, Spinal stenosis of lumbar region   Started by:  Nga Yousif NP        Dose:  10 mg   Take 1 tablet (10 mg) by mouth 2 times daily as needed for muscle spasms   Quantity:  60 tablet   Refills:  1       ibuprofen 600 MG tablet   Commonly known as:  ADVIL/MOTRIN   Used for:  Midline low back pain with left-sided sciatica, unspecified chronicity, Osteoarthritis of spine with radiculopathy, lumbar region, Lumbosacral radiculopathy at S1, Spinal stenosis of lumbar region   Started by:  Nga Yousif NP        Dose:  600 mg   Take 1 tablet (600 mg) by mouth every 8 hours " as needed for moderate pain   Quantity:  90 tablet   Refills:  1         Stop taking these medicines if you haven't already. Please contact your care team if you have questions.     diclofenac 75 MG EC tablet   Commonly known as:  VOLTAREN   Stopped by:  Nga Yousif NP           tiZANidine 4 MG tablet   Commonly known as:  ZANAFLEX   Stopped by:  Nga Yousif NP                Where to get your medicines      These medications were sent to [facility] [id number] [place] [address]  AT [facility] [address] [place] [address]     Phone:  [phone]     cyclobenzaprine 10 MG tablet    ibuprofen 600 MG tablet         Some of these will need a paper prescription and others can be bought over the counter.  Ask your nurse if you have questions.     Bring a paper prescription for each of these medications     acetaminophen-codeine 300-30 MG per tablet                Primary Care Provider Office Phone # Fax #    Paz Maria [phone]       [facility] [address] [place] [address]        Thank you!     Thank you for choosing [facility]  for your care. Our goal is always to provide you with excellent care. Hearing back from our patients is one way we can continue to improve our services. Please take a few minutes to complete the written survey that you may receive in the mail after your visit with us. Thank you!             Your Updated Medication List - Protect others around you: Learn how to safely use, store and throw away your medicines at www.disposemymeds.org.          This list is accurate as of: 4/28/17  2:12 PM.  Always use your most recent med list.                   Brand Name Dispense Instructions for use    acetaminophen-codeine 300-30 MG per tablet    TYLENOL #3    60 tablet    1-2 po TID PRN       albuterol 108 (90 BASE) MCG/ACT Inhaler   Generic drug:  albuterol     17 g    INHALE 2 PUFFS  INTO THE LUNGS FOUR TIMES DAILY       ALPRAZolam 0.25 MG tablet    XANAX    30 tablet    Take 1 tablet (0.25 mg) by mouth 3 times daily as needed for anxiety       cyclobenzaprine 10 MG tablet    FLEXERIL    60 tablet    Take 1 tablet (10 mg) by mouth 2 times daily as needed for muscle spasms       diltiazem HCl COATED BEADS 240 MG Tb24    MATZIM LA    90 tablet    Take 1 tablet by mouth daily       ibuprofen 600 MG tablet    ADVIL/MOTRIN    90 tablet    Take 1 tablet (600 mg) by mouth every 8 hours as needed for moderate pain       levothyroxine 112 MCG tablet    SYNTHROID/LEVOTHROID    90 tablet    Take 1 tablet (112 mcg) by mouth daily

## 2017-05-11 ENCOUNTER — TRANSFERRED RECORDS (OUTPATIENT)
Dept: HEALTH INFORMATION MANAGEMENT | Facility: HOSPITAL | Age: 47
End: 2017-05-11

## 2017-06-07 ENCOUNTER — TRANSFERRED RECORDS (OUTPATIENT)
Dept: HEALTH INFORMATION MANAGEMENT | Facility: HOSPITAL | Age: 47
End: 2017-06-07

## 2017-06-20 ENCOUNTER — MEDICAL CORRESPONDENCE (OUTPATIENT)
Dept: HEALTH INFORMATION MANAGEMENT | Facility: HOSPITAL | Age: 47
End: 2017-06-20

## 2017-07-27 ENCOUNTER — MYC REFILL (OUTPATIENT)
Dept: FAMILY MEDICINE | Facility: OTHER | Age: 47
End: 2017-07-27

## 2017-07-27 DIAGNOSIS — F41.9 ANXIETY: ICD-10-CM

## 2017-07-27 RX ORDER — ALPRAZOLAM 0.25 MG
0.25 TABLET ORAL 3 TIMES DAILY PRN
Qty: 90 TABLET | Refills: 2 | Status: CANCELLED | OUTPATIENT
Start: 2017-07-27

## 2017-07-27 RX ORDER — ALPRAZOLAM 0.25 MG
0.25 TABLET ORAL 3 TIMES DAILY PRN
Qty: 90 TABLET | Refills: 2 | Status: SHIPPED | OUTPATIENT
Start: 2017-07-27 | End: 2017-10-09

## 2017-07-27 NOTE — TELEPHONE ENCOUNTER
Message from [facility]:  Milvia Robbins LPN Thu Jul 27, 2017 9:59 AM        ----- Message -----   From: Selma Dubose   Sent: 7/27/2017 9:35 AM   To: [facility]  Subject: Medication Renewal Request     Original authorizing provider: MD Selma Collado would like a refill of the following medications:  ALPRAZolam (XANAX) 0.25 MG tablet [Paz Maria MD]    Preferred pharmacy: [facility] [address] [place] [address] [place] AT [facility] [address]    Comment:  I submitted refill on Tuesday, and it hasnt been refilled yet, and will be out of this medication by the weekend. Thank you.

## 2017-08-01 ENCOUNTER — TRANSFERRED RECORDS (OUTPATIENT)
Dept: HEALTH INFORMATION MANAGEMENT | Facility: HOSPITAL | Age: 47
End: 2017-08-01

## 2017-08-03 DIAGNOSIS — M54.17 LUMBOSACRAL RADICULOPATHY AT S1: ICD-10-CM

## 2017-08-03 DIAGNOSIS — M54.42 MIDLINE LOW BACK PAIN WITH LEFT-SIDED SCIATICA, UNSPECIFIED CHRONICITY: ICD-10-CM

## 2017-08-03 DIAGNOSIS — M48.061 SPINAL STENOSIS OF LUMBAR REGION: ICD-10-CM

## 2017-08-03 DIAGNOSIS — M47.26 OSTEOARTHRITIS OF SPINE WITH RADICULOPATHY, LUMBAR REGION: ICD-10-CM

## 2017-08-03 RX ORDER — IBUPROFEN 600 MG/1
600 TABLET, FILM COATED ORAL EVERY 8 HOURS PRN
Qty: 90 TABLET | Refills: 1 | Status: SHIPPED | OUTPATIENT
Start: 2017-08-03 | End: 2017-09-05

## 2017-08-14 ENCOUNTER — OFFICE VISIT (OUTPATIENT)
Dept: FAMILY MEDICINE | Facility: OTHER | Age: 47
End: 2017-08-14
Attending: NURSE PRACTITIONER
Payer: COMMERCIAL

## 2017-08-14 VITALS
BODY MASS INDEX: 42.32 KG/M2 | OXYGEN SATURATION: 98 % | WEIGHT: 254 LBS | DIASTOLIC BLOOD PRESSURE: 68 MMHG | TEMPERATURE: 98.5 F | RESPIRATION RATE: 12 BRPM | HEART RATE: 80 BPM | SYSTOLIC BLOOD PRESSURE: 112 MMHG | HEIGHT: 65 IN

## 2017-08-14 DIAGNOSIS — F17.200 TOBACCO DEPENDENCE: ICD-10-CM

## 2017-08-14 DIAGNOSIS — Z01.818 PREOP GENERAL PHYSICAL EXAM: Primary | ICD-10-CM

## 2017-08-14 DIAGNOSIS — Z72.0 TOBACCO ABUSE: ICD-10-CM

## 2017-08-14 DIAGNOSIS — J45.40 MODERATE PERSISTENT ASTHMA WITHOUT COMPLICATION: ICD-10-CM

## 2017-08-14 LAB
BASOPHILS # BLD AUTO: 0.1 10E9/L (ref 0–0.2)
BASOPHILS NFR BLD AUTO: 0.6 %
DIFFERENTIAL METHOD BLD: ABNORMAL
EOSINOPHIL # BLD AUTO: 0.3 10E9/L (ref 0–0.7)
EOSINOPHIL NFR BLD AUTO: 3.1 %
ERYTHROCYTE [DISTWIDTH] IN BLOOD BY AUTOMATED COUNT: 13.2 % (ref 10–15)
HCT VFR BLD AUTO: 41.4 % (ref 35–47)
HGB BLD-MCNC: 14.4 G/DL (ref 11.7–15.7)
LYMPHOCYTES # BLD AUTO: 2 10E9/L (ref 0.8–5.3)
LYMPHOCYTES NFR BLD AUTO: 25.1 %
MCH RBC QN AUTO: 34 PG (ref 26.5–33)
MCHC RBC AUTO-ENTMCNC: 34.8 G/DL (ref 31.5–36.5)
MCV RBC AUTO: 98 FL (ref 78–100)
MONOCYTES # BLD AUTO: 0.7 10E9/L (ref 0–1.3)
MONOCYTES NFR BLD AUTO: 8.5 %
NEUTROPHILS # BLD AUTO: 5.1 10E9/L (ref 1.6–8.3)
NEUTROPHILS NFR BLD AUTO: 62.7 %
PLATELET # BLD AUTO: 351 10E9/L (ref 150–450)
RBC # BLD AUTO: 4.24 10E12/L (ref 3.8–5.2)
WBC # BLD AUTO: 8.1 10E9/L (ref 4–11)

## 2017-08-14 PROCEDURE — 80050 GENERAL HEALTH PANEL: CPT | Performed by: NURSE PRACTITIONER

## 2017-08-14 PROCEDURE — 36415 COLL VENOUS BLD VENIPUNCTURE: CPT | Performed by: NURSE PRACTITIONER

## 2017-08-14 PROCEDURE — 93000 ELECTROCARDIOGRAM COMPLETE: CPT | Performed by: INTERNAL MEDICINE

## 2017-08-14 PROCEDURE — 84439 ASSAY OF FREE THYROXINE: CPT | Performed by: NURSE PRACTITIONER

## 2017-08-14 PROCEDURE — 99214 OFFICE O/P EST MOD 30 MIN: CPT | Mod: 25 | Performed by: NURSE PRACTITIONER

## 2017-08-14 PROCEDURE — 71020 ZZHC CHEST TWO VIEWS, FRONT/LAT: CPT | Mod: TC | Performed by: RADIOLOGY

## 2017-08-14 ASSESSMENT — ANXIETY QUESTIONNAIRES
3. WORRYING TOO MUCH ABOUT DIFFERENT THINGS: SEVERAL DAYS
6. BECOMING EASILY ANNOYED OR IRRITABLE: NOT AT ALL
IF YOU CHECKED OFF ANY PROBLEMS ON THIS QUESTIONNAIRE, HOW DIFFICULT HAVE THESE PROBLEMS MADE IT FOR YOU TO DO YOUR WORK, TAKE CARE OF THINGS AT HOME, OR GET ALONG WITH OTHER PEOPLE: NOT DIFFICULT AT ALL
1. FEELING NERVOUS, ANXIOUS, OR ON EDGE: MORE THAN HALF THE DAYS
5. BEING SO RESTLESS THAT IT IS HARD TO SIT STILL: NOT AT ALL
7. FEELING AFRAID AS IF SOMETHING AWFUL MIGHT HAPPEN: SEVERAL DAYS
GAD7 TOTAL SCORE: 6
2. NOT BEING ABLE TO STOP OR CONTROL WORRYING: SEVERAL DAYS

## 2017-08-14 ASSESSMENT — PAIN SCALES - GENERAL: PAINLEVEL: MODERATE PAIN (4)

## 2017-08-14 ASSESSMENT — PATIENT HEALTH QUESTIONNAIRE - PHQ9
SUM OF ALL RESPONSES TO PHQ QUESTIONS 1-9: 7
5. POOR APPETITE OR OVEREATING: SEVERAL DAYS

## 2017-08-14 NOTE — NURSING NOTE
"Chief Complaint   Patient presents with     Pre-Op Exam       Initial /68  Pulse 80  Temp 98.5  F (36.9  C) (Tympanic)  Resp 12  Ht 5' 5\" (1.651 m)  Wt 254 lb (115.2 kg)  SpO2 98%  BMI 42.27 kg/m2 Estimated body mass index is 42.27 kg/(m^2) as calculated from the following:    Height as of this encounter: 5' 5\" (1.651 m).    Weight as of this encounter: 254 lb (115.2 kg).  Medication Reconciliation: complete     KIMMY GARCIA      "

## 2017-08-14 NOTE — PROGRESS NOTES
[facility]  [address]  [facility] [address]  [phone]  Dept: [phone]    PRE-OP EVALUATION:  Today's date: 2017    Selma Dubose (: 1970) presents for pre-operative evaluation assessment as requested by Dr. Elio Cramer.  She requires evaluation and anesthesia risk assessment prior to undergoing surgery/procedure for treatment of Lumbar pain .     Proposed procedure: L4-5, L5-S1 lumbar decompression and fusion with instrumentation      Date of Surgery/ Procedure: 17  Time of Surgery/ Procedure: [facility]  Hospital/Surgical Facility: [facility]  Fax number for surgical facility: [phone]  Primary Physician: Paz Maria  Type of Anesthesia Anticipated: General  Patient has a Health Care Directive or Living Will:  NO      2. NO - Do you ever have any pain or discomfort in your chest?  3. NO - Do you have a history of  Heart Failure?  4. NO - Are you troubled by shortness of breath when: walking on the level, up a slight hill or at night?  5. NO - Do you currently have a cold, bronchitis or other respiratory infection?  7. NO - Do you sometimes get pains in the calves of your legs when you walk?  8. NO - Do you or anyone in your family have previous history of blood clots?  9. NO - Do you or does anyone in your family have a serious bleeding problem such as prolonged bleeding following surgeries or cuts?  10. NO - Have you ever had problems with anemia or been told to take iron pills?  11. NO - Have you had any abnormal blood loss such as black, tarry or bloody stools, or abnormal vaginal bleeding?  12. NO - Have you ever had a blood transfusion?  13. NO - Have you or any of your relatives ever had problems with anesthesia?  15. NO - Do you have any prosthetic heart valves?  16. NO - Do you have prosthetic joints?  17. NO - Is there any chance that you may be pregnant?  1. YES - Do you have a history of heart attack, stroke, stent, bypass or  surgery on an artery in the head, neck, heart or legs?  Brain aneurism - 2005.    6. YES - Do you have a cough, shortness of breath or wheezing?  Asthma, stable  14. - YES - Do you have sleep apnea, excessive snoring or daytime drowsiness?  Sleep apnea, on CPAP        HPI:                                                      Brief HPI related to upcoming procedure:   Lumbar disc herniation      MRI OF LUMBAR SPINE     CLINICAL HISTORY:  A 46-year-old female with chronic history of low  back pain and intermittent right radiculopathy.     COMPARISON:  Today's study is compared to a prior MRI of the lumbar  spine which is dated November 4, 2015.     FINDINGS:  Grade 1 degenerative subluxation of L4 relative to L5 is  similar in severity.     Conus medullaris is seen at T12-L1 and is normal.  Mild annular  bulging within the disk at T11-T12 and T12-L1 are similar.     L1-L2 again demonstrates a normally contoured disk with minimal  endplate and facet joint degeneration.     L2-L3:  Mild disk degeneration with minimal endplate and facet joint  degenerative changes.     L3-L4:  Mild disk desiccation with slight bulge, which are similar.  Small synovial cyst is again seen along the dorsomedial aspect of the  right facet joint.     L4-L5:  Moderate disk degeneration with slight chronic bulge.  Severe  facet joint degenerative changes result in grade 1 subluxation of L4  relative to L5.  The neural foramina are moderately narrowed,  asymmetrically worse on the left, with left L4 ganglionic  encroachment. The central canal is also mildly narrowed.     L5-S1:  Severe disk degeneration with chronic bulge and endplate  osteophytic spurring are again seen.  Left dorsolateral disk  osteophyte complex appears to be increased slightly in size compared  to the prior examination, contributing to left L5 ganglionic abutment.  Annular fissuring is again seen along the left parasagittal aspect of  the disk.  There is slight degenerative  change seen about the facet  joints.     Mild atrophy of the paraspinous musculature is similar in appearance.  Cortical cyst of the right kidney is also similar.     IMPRESSION:  1.  SLIGHT WORSENING OF SEVERE BILATERAL FACET JOINT DEGENERATION AT  L4-L5, AGAIN CONTRIBUTING TO DEGENERATIVE SUBLUXATION OF L4 RELATIVE  TO L5, IN ADDITION TO MILD TO MODERATE BILATERAL FORAMINAL STENOSIS.  NO EVIDENCE OF NERVE ROOT COMPRESSION.     2.  MILD TO MODERATE CENTRAL CANAL STENOSIS AT L4-L5 SECONDARY TO  DEGENERATIVE SUBLUXATION, LIGAMENTUM FLAVUM HYPERTROPHY, AND  HYPERTROPHIC DEGENERATIVE CHANGES AT THE L4-L5 FACET JOINTS.     3.  CHRONIC BULGE WITH LEFT PARASAGITTAL TO LEFT DORSOLATERAL DISK  OSTEOPHYTE COMPLEX,  IMPINGING UPON THE LEFT VENTRAL LATERAL ASPECT OF  THE THECAL SAC AND THE EXITING LEFT S1 NERVE ROOT.  CORRELATE FOR LEFT  S1 RADICULOPATHY.     4.  SLIGHT INTERVAL ENLARGEMENT OF A DISK OSTEOPHYTE COMPLEX ALONG THE  LEFT DORSOLATERAL ASPECT AT L5-S1, NOW CONTRIBUTING TO WORSENING MASS  EFFECT UPON THE LEFT L5 DORSAL ROOT GANGLION.  NO EVIDENCE OF  COMPRESSION.  ANNULAR FISSURING           SIGNATURE PAGE ONLY  Exam Date: Feb 21, 2017 01:08:53 PM  Author: BISHNU RICKETTS  This report is final and signed        Hypothyroidism, off synthroid for 3 months, TSH ordered    See problem list for active medical problems.  Problems all longstanding and stable, except as noted/documented.  See ROS for pertinent symptoms related to these conditions.                                                                                                  .    MEDICAL HISTORY:                                                    Patient Active Problem List    Diagnosis Date Noted     Acute midline low back pain with sciatica 04/28/2017     Priority: Medium     Osteoarthritis of spine with radiculopathy, lumbar region 04/28/2017     Priority: Medium     Lumbosacral radiculopathy at S1 04/28/2017     Priority: Medium     Spinal stenosis of  lumbar region 04/28/2017     Priority: Medium     history of Noncompliance with medication regimen 03/17/2015     Priority: Medium     Tension-type headache, not intractable 03/17/2014     Priority: Medium     History of tachycardia 05/07/2013     Priority: Medium     Obstructive sleep apnea 05/07/2013     Priority: Medium     Tobacco dependence 05/07/2013     Priority: Medium     Anxiety      Priority: Medium     Spina bifida with hydrocephalus (H) 12/09/2011     Priority: Medium     history of brain aneurism 12/09/2011     Priority: Medium     Acquired hypothyroidism 07/27/2005     Priority: Medium      Past Medical History:   Diagnosis Date     Acquired hypothyroidism 7/27/2005     Acute midline low back pain with sciatica 4/28/2017     Anxiety      Brain aneurysm  12/09/2011     Chilari Malformation 12/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     Hand pain, Left 12/09/2011     history of brain aneurism 12/9/2011     history of Noncompliance with medication regimen 3/17/2015     History of tachycardia 5/7/2013     Lumbosacral radiculopathy at S1 4/28/2017     Nonallopathic lesion of cervical region, not elsewhere classified 08/18/2004     Noncompliance with medication regimen 3/17/2015     Obstructive sleep apnea 5/7/2013     Osteoarthritis of spine with radiculopathy, lumbar region 4/28/2017     Spinal stenosis of lumbar region 4/28/2017     Tension-type headache, not intractable 3/17/2014     Unspecified acquired hypothyroidism 07/27/2005     Past Surgical History:   Procedure Laterality Date     CARPAL TUNNEL RELEASE RT/LT      LEFT     clipping of right cerebral artery aneurysm      cerebral artery aneurysm,nelida type 1 syndrome,     Current Outpatient Prescriptions   Medication Sig Dispense Refill     ibuprofen (ADVIL/MOTRIN) 600 MG tablet Take 1 tablet (600 mg) by mouth every 8 hours as needed for moderate pain 90 tablet 1     ALPRAZolam (XANAX) 0.25 MG tablet Take 1 tablet (0.25 mg) by mouth 3 times daily as needed for anxiety 90 tablet 2  "    acetaminophen-codeine (TYLENOL #3) 300-30 MG per tablet 1-2 po TID PRN 60 tablet 1     diltiazem HCl COATED BEADS (MATZIM LA) 240 MG TB24 Take 1 tablet by mouth daily 90 tablet 2     ALBUTEROL 108 (90 BASE) MCG/ACT inhaler INHALE 2 PUFFS INTO THE LUNGS FOUR TIMES DAILY 17 g 2     nicotine polacrilex (NICORETTE) 4 MG gum Place 1 each (4 mg) inside cheek as needed for smoking cessation (Patient not taking: Reported on 8/14/2017) 100 each 2     levothyroxine (SYNTHROID/LEVOTHROID) 112 MCG tablet Take 1 tablet (112 mcg) by mouth daily (Patient not taking: Reported on 8/14/2017) 90 tablet 1     OTC products: None, except as noted above    No Known Allergies   Latex Allergy: NO    Social History   Substance Use Topics     Smoking status: Current Every Day Smoker     Packs/day: 1.00     Years: 24.00     Types: Cigarettes     Smokeless tobacco: Never Used      Comment: Tried to Quit (NO); Passive Exposure (NO)     Alcohol use Yes      Comment: 3 Beers - WEEKLY     History   Drug Use No       REVIEW OF SYSTEMS:                                                      C: NEGATIVE for fever, chills, change in weight  I: NEGATIVE for worrisome rashes, moles or lesions  E: NEGATIVE for vision changes or irritation  E/M: NEGATIVE for ear, mouth and throat problems  R: NEGATIVE for significant cough or SOB  CV: NEGATIVE for chest pain, palpitations or peripheral edema  GI: NEGATIVE for nausea, abdominal pain, heartburn, or change in bowel habits  : NEGATIVE for frequency, dysuria, or hematuria  MUSCULOSKELETAL: right knee pain, bilateral pedal edema.  LS back pain, sciatica both LE's  N: NEGATIVE for weakness, dizziness or paresthesias  E: NEGATIVE for temperature intolerance, skin/hair changes  H: NEGATIVE for bleeding problems  P: NEGATIVE for changes in mood or affect    EXAM:                                                    /68  Pulse 80  Temp 98.5  F (36.9  C) (Tympanic)  Resp 12  Ht 5' 5\" (1.651 m)  Wt 254 lb " (115.2 kg)  SpO2 98%  BMI 42.27 kg/m2       GENERAL APPEARANCE: healthy, alert and no distress     EYES: EOMI, PERRL     HENT: ear canals and TM's normal and nose and mouth without ulcers or lesions     NECK: no adenopathy, no asymmetry, masses, or scars and thyroid normal to palpation     RESP: lungs clear to auscultation - no rales, rhonchi or wheezes     CV: regular rates and rhythm, normal S1 S2, no S3 or S4 and no murmur, click or rub     MS: Swelling and tenderness - right knee - popliteal pain. LS tenderness, bilateral SI tenderness.     SKIN: no suspicious lesions or rashes     NEURO: Normal strength and tone, sensory exam grossly normal, mentation intact and speech normal     PSYCH: mentation appears normal. and affect normal/bright    DIAGNOSTICS:                                                          EKG - NSR, see attached      Results for orders placed or performed in visit on 08/14/17   XR CHEST 2 VW (Clinic Performed)    Narrative    CHEST TWO VIEWS    REPORT:  The lungs are clear. Heart and pulmonary vessels are within  normal limits.    IMPRESSION:  NO EVIDENCE OF ACTIVE CARDIOPULMONARY DISEASE.  Exam Date: Aug 14, 2017 02:40:00 PM  Author: JOELLE FONSECA  This report is final and null     Comprehensive metabolic panel   Result Value Ref Range    Sodium 140 133 - 144 mmol/L    Potassium 4.0 3.4 - 5.3 mmol/L    Chloride 106 94 - 109 mmol/L    Carbon Dioxide 26 20 - 32 mmol/L    Anion Gap 8 3 - 14 mmol/L    Glucose 93 70 - 99 mg/dL    Urea Nitrogen 8 7 - 30 mg/dL    Creatinine 0.69 0.52 - 1.04 mg/dL    GFR Estimate >90  Non  GFR Calc   >60 mL/min/1.7m2    GFR Estimate If Black >90   GFR Calc   >60 mL/min/1.7m2    Calcium 8.7 8.5 - 10.1 mg/dL    Bilirubin Total 0.3 0.2 - 1.3 mg/dL    Albumin 3.7 3.4 - 5.0 g/dL    Protein Total 7.3 6.8 - 8.8 g/dL    Alkaline Phosphatase 67 40 - 150 U/L    ALT 62 (H) 0 - 50 U/L    AST 29 0 - 45 U/L   CBC with platelets differential    Result Value Ref Range    WBC 8.1 4.0 - 11.0 10e9/L    RBC Count 4.24 3.8 - 5.2 10e12/L    Hemoglobin 14.4 11.7 - 15.7 g/dL    Hematocrit 41.4 35.0 - 47.0 %    MCV 98 78 - 100 fl    MCH 34.0 (H) 26.5 - 33.0 pg    MCHC 34.8 31.5 - 36.5 g/dL    RDW 13.2 10.0 - 15.0 %    Platelet Count 351 150 - 450 10e9/L    Diff Method Automated Method     % Neutrophils 62.7 %    % Lymphocytes 25.1 %    % Monocytes 8.5 %    % Eosinophils 3.1 %    % Basophils 0.6 %    Absolute Neutrophil 5.1 1.6 - 8.3 10e9/L    Absolute Lymphocytes 2.0 0.8 - 5.3 10e9/L    Absolute Monocytes 0.7 0.0 - 1.3 10e9/L    Absolute Eosinophils 0.3 0.0 - 0.7 10e9/L    Absolute Basophils 0.1 0.0 - 0.2 10e9/L   TSH with free T4 reflex   Result Value Ref Range    TSH 5.73 (H) 0.40 - 4.00 mU/L   T4 free   Result Value Ref Range    T4 Free 0.81 0.76 - 1.46 ng/dL       Recent Labs   Lab Test  02/21/17   1145  12/27/13   1451  05/07/13   0931   HGB   --   13.4  15.5   PLT   --   451*  419   NA  139  138  139   POTASSIUM  4.3  3.7  4.3   CR  0.75  0.73  0.87        IMPRESSION:                                                    Reason for surgery/procedure: Lumbar disc herniation, double level fusion scheduled - L4-L5, L5-S1 lumbar decompression and fusion with instrumentation    The proposed surgical procedure is considered LOW risk.    REVISED CARDIAC RISK INDEX  The patient has the following serious cardiovascular risks for perioperative complications such as (MI, PE, VFib and 3  AV Block):  No serious cardiac risks  INTERPRETATION: 1 risks: Class II (low risk - 0.9% complication rate)    The patient has the following additional risks for perioperative complications:  Asthma - is stable  SARA - on C-PAP  Tobacco abuse - encouraged to quit          RECOMMENDATIONS:                                                        APPROVAL GIVEN to proceed with proposed procedure, without further diagnostic evaluation       Signed Electronically by: Nga Yousif NP    Copy of  this evaluation report is provided to requesting physician.      Visit time:   45 inutes  Time spent with patient, including examination, face to face patient education - 25 mn  Visit Content: During our face to face time, patient education was provided regarding diagnosis, and treatment pan. Patient counseled regarding disease process  All diagnosis and treatment plan are reviewed with the patient, 50 % of face to face time is directed at patient education  Record review completed      [facility] Preop Guidelines

## 2017-08-14 NOTE — MR AVS SNAPSHOT
After Visit Summary   8/14/2017    Selma Dubose    MRN: [id number]           Patient Information     Date Of Birth          1970        Visit Information        Provider Department      8/14/2017 2:00 PM Nga Yousif NP [facility]        Today's Diagnoses     Preop general physical exam    -  1    Moderate persistent asthma without complication        Tobacco dependence        Tobacco abuse          Care Instructions      Before Your Surgery      Call your surgeon if there is any change in your health. This includes signs of a cold or flu (such as a sore throat, runny nose, cough, rash or fever).    Do not smoke, drink alcohol or take over the counter medicine (unless your surgeon or primary care doctor tells you to) for the 24 hours before and after surgery.    If you take prescribed drugs: Follow your doctor s orders about which medicines to take and which to stop until after surgery.    Eating and drinking prior to surgery: follow the instructions from your surgeon    Take a shower or bath the night before surgery. Use the soap your surgeon gave you to gently clean your skin. If you do not have soap from your surgeon, use your regular soap. Do not shave or scrub the surgery site.  Wear clean pajamas and have clean sheets on your bed.   HOW TO QUIT SMOKING  Smoking is one of the hardest habits to break. About half of all those who have ever smoked have been able to quit, and most of those (about 70%) who still smoke want to quit. Here are some of the best ways to stop smoking.     KEEP TRYING:  It takes most smokers about 8 tries before they are finally able to fully quit. So, the more often you try and fail, the better your chance of quitting the next time! So, don't give up!    GO COLD TURKEY:  Most ex-smokers quit cold turkey. Trying to cut back gradually doesn't seem to work as well, perhaps because it continues the smoking habit. Also, it is possible to fool yourself by  inhaling more while smoking fewer cigarettes. This results in the same amount of nicotine in your body!    GET SUPPORT:  Support programs can make an important difference, especially for the heavy smoker. These groups offer lectures, methods to change your behavior and peer support. Call the free national Quitline for more information. 800-QUIT-NOW (690-760-0499). Low-cost or free programs are offered by many hospitals, local chapters of the American Lung Association (722-433-7302) and the American Cancer Society (941-421-5721). Support at home is important too. Non-smokers can help by offering praise and encouragement. If the smoker fails to quit, encourage them to try again!    OVER-THE-COUNTER MEDICINES:  For those who can't quit on their own, Nicotine Replacement Therapy (NRT) may make quitting much easier. Certain aids such as the nicotine patch, gum and lozenge are available without a prescription. However, it is best to use these under the guidance of your doctor. The skin patch provides a steady supply of nicotine to the body. Nicotine gum and lozenge gives temporary bursts of low levels of nicotine. Both methods take the edge off the craving for cigarettes. WARNING: If you feel symptoms of nicotine overdose, such as nausea, vomiting, dizziness, weakness, or fast heartbeat, stop using these and see your doctor.    PRESCRIPTION MEDICINES:  After evaluating your smoking patterns and prior attempts at quitting, your doctor may offer a prescription medicine such as bupropion (Zyban, Wellbutrin), varenicline (Chantix, Champix), a niocotine inhaler or nasal spray. Each has its unique advantage and side effects which your doctor can review with you.    HEALTH BENEFITS OF QUITTING:  The benefits of quitting start right away and keep improving the longer you go without smokin minutes: blood pressure and pulse return to normal  8 hours: oxygen levels return to normal  2 days: ability to smell and taste begins to  improve as damaged nerves start to regrow  2-3 weeks: circulation and lung function improves  1-9 months: decreased cough, congestion and shortness of breath; less tired  1 year: risk of heart attack decreases by half  5 years: risk of lung cancer decreases by half; risk of stroke becomes the same as a non-smoker  For information about how to quit smoking, visit the following links:  National Cancer [place] ,   Clearing the Air, Quit Smoking Today   - an online booklet. http://www.smokefree.gov/pubs/clearing_the_air.pdf  Smokefree.gov http://smokefree.gov/  QuitNet http://www.quitnet.com/    9345-9260 [employer], [address] [place] [address], [place] [id number]. All rights reserved. This information is not intended as a substitute for professional medical care. Always follow your healthcare professional's instructions.      The Benefits of Living Smoke Free  What do you want to gain from quitting? Check off some reasons to quit.  Health Benefits  ___ Reduce my risk of lung cancer, heart disease, chronic lung disease  ___ Have fewer wrinkles and softer skin  ___ Improve my sense of taste and smell  ___ For pregnant women--reduce the risk of having a miscarriage, stillbirth, premature birth, or low-birth-weight baby  Personal Benefits  ___ Feel more in control of my life  ___ Have better-smelling hair, breath, clothes, home, and car  ___ Save time by not having to take smoke breaks, buy cigarettes, or hunt for a light  ___ Have whiter teeth  Family Benefits  ___ Reduce my children s respiratory tract infections  ___ Set a good example for my children  ___ Reduce my family s cancer risk  Financial Benefits  ___ Save hundreds of dollars each year that would be spent on cigarettes  ___ Save money on medical bills  ___ Save on life, health, and car insurance premiums    Those Dollars Add Up!  Cigarettes are expensive, and getting more expensive all the time. Do you realize how much money you are spending on cigarettes per  year? What is the average amount you spend on a pack of cigarettes? What is the average number of packs that you smoke per day? Using your answers to these questions, fill in this formula to help you find out:  ($ _____ per pack) ×  ( _____ number of packs per day) × (365 days) =  $ _____ yearly cost of smoking  Besides tobacco, there are other costs, including extra cleaning bills and replacement costs for clothing and furniture; medical expenses for smoking-related illnesses; and higher health, life, and car insurance premiums.    Cigars and Pipes Count Too!  Cigars and pipes are also dangerous. So are smokeless (chewing) tobacco and snuff. All of these products contain nicotine, a highly addictive substance that has harmful effects on your body. Quitting smoking means giving up all tobacco products.      8335-7951 [place] [address]. All rights reserved. This information is not intended as a substitute for professional medical care. Always follow your healthcare professional's instructions.  My Asthma Action Plan  Name: Selma Dubose   YOB: 1970  Date: 8/14/2017   My doctor: Nga Yousif NP   My clinic: [facility]        My Control Medicine: albuterol PRN  My Rescue Medicine: Albuterol nebulizer solution as directed   My Asthma Severity: moderate persistent  Avoid your asthma triggers: upper respiratory infections and pollens               GREEN ZONE   Good Control    I feel good    No cough or wheeze    Can work, sleep and play without asthma symptoms       Take your asthma control medicine every day.     1. If exercise triggers your asthma, take your rescue medication    15 minutes before exercise or sports, and    During exercise if you have asthma symptoms  2. Spacer to use with inhaler: If you have a spacer, make sure to use it with your inhaler             YELLOW ZONE Getting Worse  I have ANY of these:    I do not feel good    Cough or  wheeze    Chest feels tight    Wake up at night   1. Keep taking your Green Zone medications  2. Start taking your rescue medicine:    every 20 minutes for up to 1 hour. Then every 4 hours for 24-48 hours.  3. If you stay in the Yellow Zone for more than 12-24 hours, contact your doctor.  4. If you do not return to the Green Zone in 12-24 hours or you get worse, start taking your oral steroid medicine if prescribed by your provider.           RED ZONE Medical Alert - Get Help  I have ANY of these:    I feel awful    Medicine is not helping    Breathing getting harder    Trouble walking or talking    Nose opens wide to breathe       1. Take your rescue medicine NOW  2. If your provider has prescribed an oral steroid medicine, start taking it NOW  3. Call your doctor NOW  4. If you are still in the Red Zone after 20 minutes and you have not reached your doctor:    Take your rescue medicine again and    Call 911 or go to the emergency room right away    See your regular doctor within 2 weeks of an Emergency Room or Urgent Care visit for follow-up treatment.        Electronically signed by: Nga Yousif, August 14, 2017    Annual Reminders:  Meet with Asthma Educator,  Flu Shot in the Fall, consider Pneumonia Vaccination for patients with asthma (aged 19 and older).    Pharmacy:    [facility] [address] [place] [address] [address] [facility] [address]  [facility] - [facility], [place] - [address] [facility]                    Asthma Triggers  How To Control Things That Make Your Asthma Worse    Triggers are things that make your asthma worse.  Look at the list below to help you find your triggers and what you can do about them.  You can help prevent asthma flare-ups by staying away from your triggers.      Trigger                                                          What you can do   Cigarette Smoke  Tobacco smoke can make asthma worse. Do not allow smoking in your home, car or around  you.  Be sure no one smokes at a child s day care or school.  If you smoke, ask your health care provider for ways to help you quit.  Ask family members to quit too.  Ask your health care provider for a referral to Quit Plan to help you quit smoking, or call 4-413-450-PLAN.     Colds, Flu, Bronchitis  These are common triggers of asthma. Wash your hands often.  Don t touch your eyes, nose or mouth.  Get a flu shot every year.     Dust Mites  These are tiny bugs that live in cloth or carpet. They are too small to see. Wash sheets and blankets in hot water every week.   Encase pillows and mattress in dust mite proof covers.  Avoid having carpet if you can. If you have carpet, vacuum weekly.   Use a dust mask and HEPA vacuum.   Pollen and Outdoor Mold  Some people are allergic to trees, grass, or weed pollen, or molds. Try to keep your windows closed.  Limit time out doors when pollen count is high.   Ask you health care provider about taking medicine during allergy season.     Animal Dander  Some people are allergic to skin flakes, urine or saliva from pets with fur or feathers. Keep pets with fur or feathers out of your home.    If you can t keep the pet outdoors, then keep the pet out of your bedroom.  Keep the bedroom door closed.  Keep pets off cloth furniture and away from stuffed toys.     Mice, Rats, and Cockroaches  Some people are allergic to the waste from these pests.   Cover food and garbage.  Clean up spills and food crumbs.  Store grease in the refrigerator.   Keep food out of the bedroom.   Indoor Mold  This can be a trigger if your home has high moisture. Fix leaking faucets, pipes, or other sources of water.   Clean moldy surfaces.  Dehumidify basement if it is damp and smelly.   Smoke, Strong Odors, and Sprays  These can reduce air quality. Stay away from strong odors and sprays, such as perfume, powder, hair spray, paints, smoke incense, paint, cleaning products, candles and new carpet.   Exercise  or Sports  Some people with asthma have this trigger. Be active!  Ask your doctor about taking medicine before sports or exercise to prevent symptoms.    Warm up for 5-10 minutes before and after sports or exercise.     Other Triggers of Asthma  Cold air:  Cover your nose and mouth with a scarf.  Sometimes laughing or crying can be a trigger.  Some medicines and food can trigger asthma.             Follow-ups after your visit        Additional Services     QUITPLAN  Referral       [place] [employer] QUITLINES FAX FORM  Fax form to: 1 [phone]    The clinic will facilitate the referral to the quitline.    Provider Information:  ===============================================================  Nga Yousif NP  ID#: [id number] - [facility] [phone] Fax: [phone]   [email or web address]  Payor: BCBS / Plan: BCBS OUT OF STATE / Product Type: Indemnity /   ===============================================================    The Public Health Service Guideline does not recommend providing over-the-counter nicotine replacement therapy products without physician authorization to patients with the following conditions: pregnancy, uncontrolled high blood pressure, or cardiovascular diseases.     I authorize the [place] Tobacco Quitlines to provide over-the-counter nicotine replacement products for the patient listed below if the patient's health plan benefits cover NRT or if the patient is eligible for QUITPLAN services.    Patient Consented to:  ===============================================================  - YES - I am ready to quit tobacco and request the above information be given to the quitline so they may contact me.  I understand that one of [place]'s Tobacco Quitlines will inform my provider about my participation.  ===============================================================  Please check the BEST 3-hour call window for them to  reach you: 2pm - 5pm  May we leave a message?  YES  Language Preference:  English  Phone Number: Home Phone      [phone]  Mobile          [phone]     E-mail Address: [email or web address]    ========================================================================  FOR QUITLINE USE ONLY:  THIS INFORMATION WILL BE PROVIDED BACK TO THE PROVIDER  Contact date: __/ __/__ or ____ Did not reach after three attempts.    Outcome:  __ Enrolled in telephone counseling program  __ Declined  __ Not Reached    Stage of readiness: _______________________  Planned Quit Date: ___/ ___/ ___  Comments:      2011 [facility]   This message funded by [employer] [place], an independent licensee of the Blue Cross and Blue Shield Association. Rev. 11/1/12                  Future tests that were ordered for you today     Open Future Orders        Priority Expected Expires Ordered    QUITPLAN  Referral Routine  10/13/2017 8/14/2017            Who to contact     If you have questions or need follow up information about today's clinic visit or your schedule please contact [facility] directly at [phone].  Normal or non-critical lab and imaging results will be communicated to you by MyChart, letter or phone within 4 business days after the clinic has received the results. If you do not hear from us within 7 days, please contact the clinic through MyChart or phone. If you have a critical or abnormal lab result, we will notify you by phone as soon as possible.  Submit refill requests through [employer] or call your pharmacy and they will forward the refill request to us. Please allow 3 business days for your refill to be completed.          Additional Information About Your Visit        [email or web address] Information     [employer] gives you secure access to your electronic health record. If you see a primary care provider, you can also send messages to your care team and make appointments. If you  "have questions, please call your primary care clinic.  If you do not have a primary care provider, please call [phone] and they will assist you.        Care EveryWhere ID     This is your Care EveryWhere ID. This could be used by other organizations to access your [facility] medical records  [id number]        Your Vitals Were     Pulse Temperature Respirations Height Pulse Oximetry BMI (Body Mass Index)    80 98.5  F (36.9  C) (Tympanic) 12 5' 5\" (1.651 m) 98% 42.27 kg/m2       Blood Pressure from Last 3 Encounters:   08/14/17 112/68   04/28/17 124/78   02/21/17 120/80    Weight from Last 3 Encounters:   08/14/17 254 lb (115.2 kg)   04/28/17 220 lb (99.8 kg)   02/21/17 252 lb (114.3 kg)              We Performed the Following     Asthma Action Plan (AAP)     CBC with platelets differential     Comprehensive metabolic panel     EKG 12-lead complete w/read - (Clinic Performed)     TOBACCO CESSATION - FOR HEALTH MAINTENANCE     Tobacco Cessation - for Health Maintenance     TSH with free T4 reflex     XR CHEST 2 VW (Clinic Performed)        Primary Care Provider Office Phone # Fax #    Paz Maria [phone] [address] [facility] [address]        Equal Access to Services     HEMANT TY : Tammy mathiaso [place], waaxda luqadaha, qaybta kaalmada adeegyada, prema velasquez. So [facility] [phone].    ATENCIÓN: Si habla español, tiene a chaves disposición servicios gratuitos de asistencia lingüística. dahiana al [phone].    We comply with applicable federal civil rights laws and [place] laws. We do not discriminate on the basis of race, color, national origin, age, disability sex, sexual orientation or gender identity.            Thank you!     Thank you for choosing [facility]  for your care. Our goal is always to provide you with excellent care. Hearing back from our patients is one way we can continue to improve our " services. Please take a few minutes to complete the written survey that you may receive in the mail after your visit with us. Thank you!             Your Updated Medication List - Protect others around you: Learn how to safely use, store and throw away your medicines at www.disposemymeds.org.          This list is accurate as of: 8/14/17  3:14 PM.  Always use your most recent med list.                   Brand Name Dispense Instructions for use Diagnosis    acetaminophen-codeine 300-30 MG per tablet    TYLENOL #3    60 tablet    1-2 po TID PRN    Midline low back pain with left-sided sciatica, unspecified chronicity, Osteoarthritis of spine with radiculopathy, lumbar region, Lumbosacral radiculopathy at S1, Spinal stenosis of lumbar region       albuterol 108 (90 BASE) MCG/ACT Inhaler   Generic drug:  albuterol     17 g    INHALE 2 PUFFS INTO THE LUNGS FOUR TIMES DAILY    SOB (shortness of breath)       ALPRAZolam 0.25 MG tablet    XANAX    90 tablet    Take 1 tablet (0.25 mg) by mouth 3 times daily as needed for anxiety    Anxiety       diltiazem HCl COATED BEADS 240 MG Tb24    MATZIM LA    90 tablet    Take 1 tablet by mouth daily    Tachycardia       ibuprofen 600 MG tablet    ADVIL/MOTRIN    90 tablet    Take 1 tablet (600 mg) by mouth every 8 hours as needed for moderate pain    Midline low back pain with left-sided sciatica, unspecified chronicity, Osteoarthritis of spine with radiculopathy, lumbar region, Lumbosacral radiculopathy at S1, Spinal stenosis of lumbar region       levothyroxine 112 MCG tablet    SYNTHROID/LEVOTHROID    90 tablet    Take 1 tablet (112 mcg) by mouth daily    Hypothyroidism, unspecified type       nicotine polacrilex 4 MG gum    NICORETTE    100 each    Place 1 each (4 mg) inside cheek as needed for smoking cessation    Tobacco use

## 2017-08-14 NOTE — PATIENT INSTRUCTIONS
Before Your Surgery      Call your surgeon if there is any change in your health. This includes signs of a cold or flu (such as a sore throat, runny nose, cough, rash or fever).    Do not smoke, drink alcohol or take over the counter medicine (unless your surgeon or primary care doctor tells you to) for the 24 hours before and after surgery.    If you take prescribed drugs: Follow your doctor s orders about which medicines to take and which to stop until after surgery.    Eating and drinking prior to surgery: follow the instructions from your surgeon    Take a shower or bath the night before surgery. Use the soap your surgeon gave you to gently clean your skin. If you do not have soap from your surgeon, use your regular soap. Do not shave or scrub the surgery site.  Wear clean pajamas and have clean sheets on your bed.   HOW TO QUIT SMOKING  Smoking is one of the hardest habits to break. About half of all those who have ever smoked have been able to quit, and most of those (about 70%) who still smoke want to quit. Here are some of the best ways to stop smoking.     KEEP TRYING:  It takes most smokers about 8 tries before they are finally able to fully quit. So, the more often you try and fail, the better your chance of quitting the next time! So, don't give up!    GO COLD TURKEY:  Most ex-smokers quit cold turkey. Trying to cut back gradually doesn't seem to work as well, perhaps because it continues the smoking habit. Also, it is possible to fool yourself by inhaling more while smoking fewer cigarettes. This results in the same amount of nicotine in your body!    GET SUPPORT:  Support programs can make an important difference, especially for the heavy smoker. These groups offer lectures, methods to change your behavior and peer support. Call the free national Quitline for more information. 800-QUIT-NOW (891-137-9539). Low-cost or free programs are offered by many hospitals, local chapters of the American Lung  Association (724-925-5302) and the American Cancer Society (637-811-5287). Support at home is important too. Non-smokers can help by offering praise and encouragement. If the smoker fails to quit, encourage them to try again!    OVER-THE-COUNTER MEDICINES:  For those who can't quit on their own, Nicotine Replacement Therapy (NRT) may make quitting much easier. Certain aids such as the nicotine patch, gum and lozenge are available without a prescription. However, it is best to use these under the guidance of your doctor. The skin patch provides a steady supply of nicotine to the body. Nicotine gum and lozenge gives temporary bursts of low levels of nicotine. Both methods take the edge off the craving for cigarettes. WARNING: If you feel symptoms of nicotine overdose, such as nausea, vomiting, dizziness, weakness, or fast heartbeat, stop using these and see your doctor.    PRESCRIPTION MEDICINES:  After evaluating your smoking patterns and prior attempts at quitting, your doctor may offer a prescription medicine such as bupropion (Zyban, Wellbutrin), varenicline (Chantix, Champix), a niocotine inhaler or nasal spray. Each has its unique advantage and side effects which your doctor can review with you.    HEALTH BENEFITS OF QUITTING:  The benefits of quitting start right away and keep improving the longer you go without smokin minutes: blood pressure and pulse return to normal  8 hours: oxygen levels return to normal  2 days: ability to smell and taste begins to improve as damaged nerves start to regrow  2-3 weeks: circulation and lung function improves  1-9 months: decreased cough, congestion and shortness of breath; less tired  1 year: risk of heart attack decreases by half  5 years: risk of lung cancer decreases by half; risk of stroke becomes the same as a non-smoker  For information about how to quit smoking, visit the following links:  [employer] [place] ,   Clearing the Air, Quit Smoking Today   -  an online booklet. http://www.smokefree.gov/pubs/clearing_the_air.pdf  Smokefree.gov http://smokefree.gov/  QuitNet http://www.quitnet.com/    7214-6873 [employer], [address] [place] [address], [place] [address]. All rights reserved. This information is not intended as a substitute for professional medical care. Always follow your healthcare professional's instructions.      The Benefits of Living Smoke Free  What do you want to gain from quitting? Check off some reasons to quit.  Health Benefits  ___ Reduce my risk of lung cancer, heart disease, chronic lung disease  ___ Have fewer wrinkles and softer skin  ___ Improve my sense of taste and smell  ___ For pregnant women reduce the risk of having a miscarriage, stillbirth, premature birth, or low-birth-weight baby  Personal Benefits  ___ Feel more in control of my life  ___ Have better-smelling hair, breath, clothes, home, and car  ___ Save time by not having to take smoke breaks, buy cigarettes, or hunt for a light  ___ Have whiter teeth  Family Benefits  ___ Reduce my children s respiratory tract infections  ___ Set a good example for my children  ___ Reduce my family s cancer risk  Financial Benefits  ___ Save hundreds of dollars each year that would be spent on cigarettes  ___ Save money on medical bills  ___ Save on life, health, and car insurance premiums    Those Dollars Add Up!  Cigarettes are expensive, and getting more expensive all the time. Do you realize how much money you are spending on cigarettes per year? What is the average amount you spend on a pack of cigarettes? What is the average number of packs that you smoke per day? Using your answers to these questions, fill in this formula to help you find out:  ($ _____ per pack) ×  ( _____ number of packs per day) × (365 days) =  $ _____ yearly cost of smoking  Besides tobacco, there are other costs, including extra cleaning bills and replacement costs for clothing and furniture; medical expenses for  smoking-related illnesses; and higher health, life, and car insurance premiums.    Cigars and Pipes Count Too!  Cigars and pipes are also dangerous. So are smokeless (chewing) tobacco and snuff. All of these products contain nicotine, a highly addictive substance that has harmful effects on your body. Quitting smoking means giving up all tobacco products.      9296-3923 [facility], [address] [place] [address], [place] [address]. All rights reserved. This information is not intended as a substitute for professional medical care. Always follow your healthcare professional's instructions.  My Asthma Action Plan  Name: Selma Dubose   YOB: 1970  Date: 8/14/2017   My doctor: Nga Yousif NP   My clinic: [facility]        My Control Medicine: albuterol PRN  My Rescue Medicine: Albuterol nebulizer solution as directed   My Asthma Severity: moderate persistent  Avoid your asthma triggers: upper respiratory infections and pollens               GREEN ZONE   Good Control    I feel good    No cough or wheeze    Can work, sleep and play without asthma symptoms       Take your asthma control medicine every day.     1. If exercise triggers your asthma, take your rescue medication    15 minutes before exercise or sports, and    During exercise if you have asthma symptoms  2. Spacer to use with inhaler: If you have a spacer, make sure to use it with your inhaler             YELLOW ZONE Getting Worse  I have ANY of these:    I do not feel good    Cough or wheeze    Chest feels tight    Wake up at night   1. Keep taking your Green Zone medications  2. Start taking your rescue medicine:    every 20 minutes for up to 1 hour. Then every 4 hours for 24-48 hours.  3. If you stay in the Yellow Zone for more than 12-24 hours, contact your doctor.  4. If you do not return to the Green Zone in 12-24 hours or you get worse, start taking your oral steroid medicine if prescribed by your provider.           RED ZONE  Medical Alert - Get Help  I have ANY of these:    I feel awful    Medicine is not helping    Breathing getting harder    Trouble walking or talking    Nose opens wide to breathe       1. Take your rescue medicine NOW  2. If your provider has prescribed an oral steroid medicine, start taking it NOW  3. Call your doctor NOW  4. If you are still in the Red Zone after 20 minutes and you have not reached your doctor:    Take your rescue medicine again and    Call 911 or go to the emergency room right away    See your regular doctor within 2 weeks of an Emergency Room or Urgent Care visit for follow-up treatment.        Electronically signed by: Nga Yousif, August 14, 2017    Annual Reminders:  Meet with Asthma Educator,  Flu Shot in the Fall, consider Pneumonia Vaccination for patients with asthma (aged 19 and older).    Pharmacy:    [facility] [address] [place] [address] [facility]  [facility] - [address]                    Asthma Triggers  How To Control Things That Make Your Asthma Worse    Triggers are things that make your asthma worse.  Look at the list below to help you find your triggers and what you can do about them.  You can help prevent asthma flare-ups by staying away from your triggers.      Trigger                                                          What you can do   Cigarette Smoke  Tobacco smoke can make asthma worse. Do not allow smoking in your home, car or around you.  Be sure no one smokes at a child s day care or school.  If you smoke, ask your health care provider for ways to help you quit.  Ask family members to quit too.  Ask your health care provider for a referral to Quit Plan to help you quit smoking, or call 8-421-112-PLAN.     Colds, Flu, Bronchitis  These are common triggers of asthma. Wash your hands often.  Don t touch your eyes, nose or mouth.  Get a flu shot every year.     Dust Mites  These are  tiny bugs that live in cloth or carpet. They are too small to see. Wash sheets and blankets in hot water every week.   Encase pillows and mattress in dust mite proof covers.  Avoid having carpet if you can. If you have carpet, vacuum weekly.   Use a dust mask and HEPA vacuum.   Pollen and Outdoor Mold  Some people are allergic to trees, grass, or weed pollen, or molds. Try to keep your windows closed.  Limit time out doors when pollen count is high.   Ask you health care provider about taking medicine during allergy season.     Animal Dander  Some people are allergic to skin flakes, urine or saliva from pets with fur or feathers. Keep pets with fur or feathers out of your home.    If you can t keep the pet outdoors, then keep the pet out of your bedroom.  Keep the bedroom door closed.  Keep pets off cloth furniture and away from stuffed toys.     Mice, Rats, and Cockroaches  Some people are allergic to the waste from these pests.   Cover food and garbage.  Clean up spills and food crumbs.  Store grease in the refrigerator.   Keep food out of the bedroom.   Indoor Mold  This can be a trigger if your home has high moisture. Fix leaking faucets, pipes, or other sources of water.   Clean moldy surfaces.  Dehumidify basement if it is damp and smelly.   Smoke, Strong Odors, and Sprays  These can reduce air quality. Stay away from strong odors and sprays, such as perfume, powder, hair spray, paints, smoke incense, paint, cleaning products, candles and new carpet.   Exercise or Sports  Some people with asthma have this trigger. Be active!  Ask your doctor about taking medicine before sports or exercise to prevent symptoms.    Warm up for 5-10 minutes before and after sports or exercise.     Other Triggers of Asthma  Cold air:  Cover your nose and mouth with a scarf.  Sometimes laughing or crying can be a trigger.  Some medicines and food can trigger asthma.

## 2017-08-15 LAB
ALBUMIN SERPL-MCNC: 3.7 G/DL (ref 3.4–5)
ALP SERPL-CCNC: 67 U/L (ref 40–150)
ALT SERPL W P-5'-P-CCNC: 62 U/L (ref 0–50)
ANION GAP SERPL CALCULATED.3IONS-SCNC: 8 MMOL/L (ref 3–14)
AST SERPL W P-5'-P-CCNC: 29 U/L (ref 0–45)
BILIRUB SERPL-MCNC: 0.3 MG/DL (ref 0.2–1.3)
BUN SERPL-MCNC: 8 MG/DL (ref 7–30)
CALCIUM SERPL-MCNC: 8.7 MG/DL (ref 8.5–10.1)
CHLORIDE SERPL-SCNC: 106 MMOL/L (ref 94–109)
CO2 SERPL-SCNC: 26 MMOL/L (ref 20–32)
CREAT SERPL-MCNC: 0.69 MG/DL (ref 0.52–1.04)
GFR SERPL CREATININE-BSD FRML MDRD: ABNORMAL ML/MIN/1.7M2
GLUCOSE SERPL-MCNC: 93 MG/DL (ref 70–99)
POTASSIUM SERPL-SCNC: 4 MMOL/L (ref 3.4–5.3)
PROT SERPL-MCNC: 7.3 G/DL (ref 6.8–8.8)
SODIUM SERPL-SCNC: 140 MMOL/L (ref 133–144)
T4 FREE SERPL-MCNC: 0.81 NG/DL (ref 0.76–1.46)
TSH SERPL DL<=0.005 MIU/L-ACNC: 5.73 MU/L (ref 0.4–4)

## 2017-08-15 ASSESSMENT — ANXIETY QUESTIONNAIRES: GAD7 TOTAL SCORE: 6

## 2017-08-15 ASSESSMENT — ASTHMA QUESTIONNAIRES: ACT_TOTALSCORE: 24

## 2017-08-19 ENCOUNTER — HEALTH MAINTENANCE LETTER (OUTPATIENT)
Age: 47
End: 2017-08-19

## 2017-08-22 ENCOUNTER — TRANSFERRED RECORDS (OUTPATIENT)
Dept: HEALTH INFORMATION MANAGEMENT | Facility: HOSPITAL | Age: 47
End: 2017-08-22

## 2017-09-05 ENCOUNTER — OFFICE VISIT (OUTPATIENT)
Dept: FAMILY MEDICINE | Facility: OTHER | Age: 47
End: 2017-09-05
Attending: FAMILY MEDICINE
Payer: COMMERCIAL

## 2017-09-05 VITALS
HEART RATE: 82 BPM | WEIGHT: 254 LBS | RESPIRATION RATE: 18 BRPM | DIASTOLIC BLOOD PRESSURE: 68 MMHG | OXYGEN SATURATION: 98 % | SYSTOLIC BLOOD PRESSURE: 110 MMHG | TEMPERATURE: 98 F | BODY MASS INDEX: 42.32 KG/M2 | HEIGHT: 65 IN

## 2017-09-05 DIAGNOSIS — R60.0 PERIPHERAL EDEMA: Primary | ICD-10-CM

## 2017-09-05 DIAGNOSIS — Z98.890 S/P LUMBAR SPINE OPERATION: ICD-10-CM

## 2017-09-05 DIAGNOSIS — Z71.6 TOBACCO ABUSE COUNSELING: ICD-10-CM

## 2017-09-05 DIAGNOSIS — Z72.0 TOBACCO ABUSE: ICD-10-CM

## 2017-09-05 PROBLEM — E66.01 MORBID OBESITY (H): Status: ACTIVE | Noted: 2017-09-05

## 2017-09-05 PROCEDURE — 99214 OFFICE O/P EST MOD 30 MIN: CPT | Performed by: FAMILY MEDICINE

## 2017-09-05 RX ORDER — FUROSEMIDE 20 MG
20 TABLET ORAL DAILY
Qty: 7 TABLET | Refills: 0 | Status: SHIPPED | OUTPATIENT
Start: 2017-09-05 | End: 2018-01-05

## 2017-09-05 RX ORDER — OXYCODONE AND ACETAMINOPHEN 5; 325 MG/1; MG/1
1-2 TABLET ORAL EVERY 8 HOURS PRN
Qty: 30 TABLET | Refills: 0 | Status: SHIPPED | OUTPATIENT
Start: 2017-09-05 | End: 2018-01-05

## 2017-09-05 RX ORDER — POTASSIUM CHLORIDE 750 MG/1
20 TABLET, EXTENDED RELEASE ORAL DAILY
Qty: 14 TABLET | Refills: 0 | Status: SHIPPED | OUTPATIENT
Start: 2017-09-05 | End: 2017-09-12

## 2017-09-05 RX ORDER — OXYCODONE AND ACETAMINOPHEN 5; 325 MG/1; MG/1
1-2 TABLET ORAL EVERY 4 HOURS PRN
COMMUNITY
End: 2017-09-05

## 2017-09-05 ASSESSMENT — ANXIETY QUESTIONNAIRES
3. WORRYING TOO MUCH ABOUT DIFFERENT THINGS: NOT AT ALL
4. TROUBLE RELAXING: SEVERAL DAYS
IF YOU CHECKED OFF ANY PROBLEMS ON THIS QUESTIONNAIRE, HOW DIFFICULT HAVE THESE PROBLEMS MADE IT FOR YOU TO DO YOUR WORK, TAKE CARE OF THINGS AT HOME, OR GET ALONG WITH OTHER PEOPLE: SOMEWHAT DIFFICULT
5. BEING SO RESTLESS THAT IT IS HARD TO SIT STILL: NOT AT ALL
2. NOT BEING ABLE TO STOP OR CONTROL WORRYING: NOT AT ALL
1. FEELING NERVOUS, ANXIOUS, OR ON EDGE: SEVERAL DAYS
6. BECOMING EASILY ANNOYED OR IRRITABLE: NOT AT ALL
7. FEELING AFRAID AS IF SOMETHING AWFUL MIGHT HAPPEN: NOT AT ALL
GAD7 TOTAL SCORE: 2

## 2017-09-05 ASSESSMENT — PAIN SCALES - GENERAL: PAINLEVEL: SEVERE PAIN (6)

## 2017-09-05 ASSESSMENT — PATIENT HEALTH QUESTIONNAIRE - PHQ9: SUM OF ALL RESPONSES TO PHQ QUESTIONS 1-9: 10

## 2017-09-05 NOTE — NURSING NOTE
"Chief Complaint   Patient presents with     Swelling     bilateral foot and leg       Initial /68 (BP Location: Left arm, Patient Position: Chair, Cuff Size: Adult Large)  Pulse 82  Temp 98  F (36.7  C) (Tympanic)  Resp 18  Ht 5' 5\" (1.651 m)  Wt 254 lb (115.2 kg)  LMP 09/03/2017 (Approximate)  SpO2 98%  BMI 42.27 kg/m2 Estimated body mass index is 42.27 kg/(m^2) as calculated from the following:    Height as of this encounter: 5' 5\" (1.651 m).    Weight as of this encounter: 254 lb (115.2 kg).  Medication Reconciliation: complete   Pamela M Lechevalier LPN      "

## 2017-09-05 NOTE — MR AVS SNAPSHOT
After Visit Summary   9/5/2017    Selma Dubose    MRN: [id number]           Patient Information     Date Of Birth          1970        Visit Information        Provider Department      9/5/2017 3:45 PM Paz Maria MD [facility]        Today's Diagnoses     Peripheral edema    -  1    S/P lumbar spine operation        Tobacco abuse        Tobacco abuse counseling          Care Instructions      HOW TO QUIT SMOKING  Smoking is one of the hardest habits to break. About half of all those who have ever smoked have been able to quit, and most of those (about 70%) who still smoke want to quit. Here are some of the best ways to stop smoking.     KEEP TRYING:  It takes most smokers about 8 tries before they are finally able to fully quit. So, the more often you try and fail, the better your chance of quitting the next time! So, don't give up!    GO COLD TURKEY:  Most ex-smokers quit cold turkey. Trying to cut back gradually doesn't seem to work as well, perhaps because it continues the smoking habit. Also, it is possible to fool yourself by inhaling more while smoking fewer cigarettes. This results in the same amount of nicotine in your body!    GET SUPPORT:  Support programs can make an important difference, especially for the heavy smoker. These groups offer lectures, methods to change your behavior and peer support. Call the free national Quitline for more information. 800-QUIT-NOW (934-515-3331). Low-cost or free programs are offered by many hospitals, local chapters of the American Lung Association (774-862-6441) and the American Cancer Society (381-544-0107). Support at home is important too. Non-smokers can help by offering praise and encouragement. If the smoker fails to quit, encourage them to try again!    OVER-THE-COUNTER MEDICINES:  For those who can't quit on their own, Nicotine Replacement Therapy (NRT) may make quitting much easier. Certain aids such as the nicotine  patch, gum and lozenge are available without a prescription. However, it is best to use these under the guidance of your doctor. The skin patch provides a steady supply of nicotine to the body. Nicotine gum and lozenge gives temporary bursts of low levels of nicotine. Both methods take the edge off the craving for cigarettes. WARNING: If you feel symptoms of nicotine overdose, such as nausea, vomiting, dizziness, weakness, or fast heartbeat, stop using these and see your doctor.    PRESCRIPTION MEDICINES:  After evaluating your smoking patterns and prior attempts at quitting, your doctor may offer a prescription medicine such as bupropion (Zyban, Wellbutrin), varenicline (Chantix, Champix), a niocotine inhaler or nasal spray. Each has its unique advantage and side effects which your doctor can review with you.    HEALTH BENEFITS OF QUITTING:  The benefits of quitting start right away and keep improving the longer you go without smokin minutes: blood pressure and pulse return to normal  8 hours: oxygen levels return to normal  2 days: ability to smell and taste begins to improve as damaged nerves start to regrow  2-3 weeks: circulation and lung function improves  1-9 months: decreased cough, congestion and shortness of breath; less tired  1 year: risk of heart attack decreases by half  5 years: risk of lung cancer decreases by half; risk of stroke becomes the same as a non-smoker  For information about how to quit smoking, visit the following links:  National [employer] [place] ,   Clearing the Air, Quit Smoking Today   - an online booklet. http://www.smokefree.gov/pubs/clearing_the_air.pdf  Smokefree.gov http://smokefree.gov/  QuitNet http://www.quitnet.com/    0154-0004 T[employer], [address] [place] [address], [place] [address]. All rights reserved. This information is not intended as a substitute for professional medical care. Always follow your healthcare professional's instructions.    The Benefits of Living  Smoke Free  What do you want to gain from quitting? Check off some reasons to quit.  Health Benefits  ___ Reduce my risk of lung cancer, heart disease, chronic lung disease  ___ Have fewer wrinkles and softer skin  ___ Improve my sense of taste and smell  ___ For pregnant women--reduce the risk of having a miscarriage, stillbirth, premature birth, or low-birth-weight baby  Personal Benefits  ___ Feel more in control of my life  ___ Have better-smelling hair, breath, clothes, home, and car  ___ Save time by not having to take smoke breaks, buy cigarettes, or hunt for a light  ___ Have whiter teeth  Family Benefits  ___ Reduce my children s respiratory tract infections  ___ Set a good example for my children  ___ Reduce my family s cancer risk  Financial Benefits  ___ Save hundreds of dollars each year that would be spent on cigarettes  ___ Save money on medical bills  ___ Save on life, health, and car insurance premiums    Those Dollars Add Up!  Cigarettes are expensive, and getting more expensive all the time. Do you realize how much money you are spending on cigarettes per year? What is the average amount you spend on a pack of cigarettes? What is the average number of packs that you smoke per day? Using your answers to these questions, fill in this formula to help you find out:  ($ _____ per pack) ×  ( _____ number of packs per day) × (365 days) =  $ _____ yearly cost of smoking  Besides tobacco, there are other costs, including extra cleaning bills and replacement costs for clothing and furniture; medical expenses for smoking-related illnesses; and higher health, life, and car insurance premiums.    Cigars and Pipes Count Too!  Cigars and pipes are also dangerous. So are smokeless (chewing) tobacco and snuff. All of these products contain nicotine, a highly addictive substance that has harmful effects on your body. Quitting smoking means giving up all tobacco products.      5265-3390 [facility], [address] [place] [address]  "[place] [address]. All rights reserved. This information is not intended as a substitute for professional medical care. Always follow your healthcare professional's instructions.          Follow-ups after your visit        Follow-up notes from your care team     Return if symptoms worsen or fail to improve.      Who to contact     If you have questions or need follow up information about today's clinic visit or your schedule please contact [facility] directly at [phone].  Normal or non-critical lab and imaging results will be communicated to you by Yuenimeihart, letter or phone within 4 business days after the clinic has received the results. If you do not hear from us within 7 days, please contact the clinic through Slackt or phone. If you have a critical or abnormal lab result, we will notify you by phone as soon as possible.  Submit refill requests through SwiftPayMD(TM) by [employer] or call your pharmacy and they will forward the refill request to us. Please allow 3 business days for your refill to be completed.          Additional Information About Your Visit        YuenimeiharAirtasker Information     SwiftPayMD(TM) by [employer] gives you secure access to your electronic health record. If you see a primary care provider, you can also send messages to your care team and make appointments. If you have questions, please call your primary care clinic.  If you do not have a primary care provider, please call [phone] and they will assist you.        Care EveryWhere ID     This is your Care EveryWhere ID. This could be used by other organizations to access your [facility] medical records  [id number]        Your Vitals Were     Pulse Temperature Respirations Height Last Period Pulse Oximetry    82 98  F (36.7  C) (Tympanic) 18 5' 5\" (1.651 m) 09/03/2017 (Approximate) 98%    BMI (Body Mass Index)                   42.27 kg/m2            Blood Pressure from Last 3 Encounters:   09/05/17 110/68   08/14/17 112/68   04/28/17 124/78    Weight " from Last 3 Encounters:   09/05/17 254 lb (115.2 kg)   08/14/17 254 lb (115.2 kg)   04/28/17 220 lb (99.8 kg)              We Performed the Following     Tobacco Cessation - for Health Maintenance          Today's Medication Changes          These changes are accurate as of: 9/5/17 11:59 PM.  If you have any questions, ask your nurse or doctor.               Start taking these medicines.        Dose/Directions    furosemide 20 MG tablet   Commonly known as:  LASIX   Used for:  Peripheral edema        Dose:  20 mg   Take 1 tablet (20 mg) by mouth daily for 7 days   Quantity:  7 tablet   Refills:  0       potassium chloride SA 10 MEQ CR tablet   Commonly known as:  K-DUR/KLOR-CON M   Used for:  Peripheral edema        Dose:  20 mEq   Take 2 tablets (20 mEq) by mouth daily for 7 days   Quantity:  14 tablet   Refills:  0         These medicines have changed or have updated prescriptions.        Dose/Directions    oxyCODONE-acetaminophen 5-325 MG per tablet   Commonly known as:  PERCOCET   This may have changed:  when to take this   Used for:  S/P lumbar spine operation        Dose:  1-2 tablet   Take 1-2 tablets by mouth every 8 hours as needed for moderate to severe pain   Quantity:  30 tablet   Refills:  0         Stop taking these medicines if you haven't already. Please contact your care team if you have questions.     OXYCONTIN PO                Where to get your medicines      These medications were sent to [facility] [id number] [place] [address] [address] AT [facility] [address]  [address], [place] [address]     Phone:  [phone]     furosemide 20 MG tablet    potassium chloride SA 10 MEQ CR tablet         Some of these will need a paper prescription and others can be bought over the counter.  Ask your nurse if you have questions.     Bring a paper prescription for each of these medications     oxyCODONE-acetaminophen 5-325 MG per tablet                Primary Care Provider  Office Phone # Fax #    Paz DEE [address] Papa [phone] [address] [facility] [address]        Equal Access to Services     HEMANT TY : Tammy Hollins, waloc tapia, moralesta kabettie doreendanielleoctavio, prema teresain hayaaivon nailsalpabarndy velasquez. So [facility] [phone].    ATENCIÓN: Si habla español, tiene a chaves disposición servicios gratuitos de asistencia lingüística. Llame al [phone].    We comply with applicable federal civil rights laws and [place] laws. We do not discriminate on the basis of race, color, national origin, age, disability sex, sexual orientation or gender identity.            Thank you!     Thank you for choosing [facility]  for your care. Our goal is always to provide you with excellent care. Hearing back from our patients is one way we can continue to improve our services. Please take a few minutes to complete the written survey that you may receive in the mail after your visit with us. Thank you!             Your Updated Medication List - Protect others around you: Learn how to safely use, store and throw away your medicines at www.disposemymeds.org.          This list is accurate as of: 9/5/17 11:59 PM.  Always use your most recent med list.                   Brand Name Dispense Instructions for use Diagnosis    ALPRAZolam 0.25 MG tablet    XANAX    90 tablet    Take 1 tablet (0.25 mg) by mouth 3 times daily as needed for anxiety    Anxiety       diltiazem HCl COATED BEADS 240 MG Tb24    MATZIM LA    90 tablet    Take 1 tablet by mouth daily    Tachycardia       furosemide 20 MG tablet    LASIX    7 tablet    Take 1 tablet (20 mg) by mouth daily for 7 days    Peripheral edema       levothyroxine 112 MCG tablet    SYNTHROID/LEVOTHROID    90 tablet    Take 1 tablet (112 mcg) by mouth daily    Hypothyroidism, unspecified type       METHOCARBAMOL PO      Take 750 mg by mouth 4 times daily        nicotine polacrilex 4 MG gum     NICORETTE    100 each    Place 1 each (4 mg) inside cheek as needed for smoking cessation    Tobacco use       oxyCODONE-acetaminophen 5-325 MG per tablet    PERCOCET    30 tablet    Take 1-2 tablets by mouth every 8 hours as needed for moderate to severe pain    S/P lumbar spine operation       potassium chloride SA 10 MEQ CR tablet    K-DUR/KLOR-CON M    14 tablet    Take 2 tablets (20 mEq) by mouth daily for 7 days    Peripheral edema       PROAIR  (90 BASE) MCG/ACT Inhaler   Generic drug:  albuterol     17 g    INHALE 2 PUFFS INTO THE LUNGS FOUR TIMES DAILY    SOB (shortness of breath)

## 2017-09-05 NOTE — PROGRESS NOTES
SUBJECTIVE:   Selma Dubose is a 46 year old female who presents to clinic today for the following health issues:    Edema      Duration: few weeks    Description (location/character/radiation): bilateral lower extremity edema    Intensity:  mild, moderate    Accompanying signs and symptoms: no pain, redness or discrete areas of swelling    History (similar episodes/previous evaluation): None    Precipitating or alleviating factors: recent spinal surgery    Therapies tried and outcome: elevation - no improvement        Patient does need a refill of pain medication.  She is trying to limit use.      Problem list and histories reviewed & adjusted, as indicated.  Additional history: as documented    Patient Active Problem List   Diagnosis     Acquired hypothyroidism     Anxiety     History of tachycardia     Obstructive sleep apnea     Tobacco dependence     Spina bifida with hydrocephalus (H)     history of brain aneurism     Tension-type headache, not intractable     history of Noncompliance with medication regimen     Acute midline low back pain with sciatica     Osteoarthritis of spine with radiculopathy, lumbar region     Lumbosacral radiculopathy at S1     Spinal stenosis of lumbar region     Moderate persistent asthma without complication     Morbid obesity (H)     Past Surgical History:   Procedure Laterality Date     BACK SURGERY  08/29/2017    [facility]- L4-L5 laminectomy and decompression L5-S1 transforaminal lumbar fusion, L5-S! diskectomy, L4-S! 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,nelida type 1 syndrome,       Social History   Substance Use Topics     Smoking status: Current Every Day Smoker     Packs/day: 1.00     Years: 24.00     Types: Cigarettes     Smokeless tobacco: Never Used      Comment: Tried to Quit (NO); Passive Exposure (NO)     Alcohol use  "Yes      Comment: 3 Beers - WEEKLY     Family History   Problem Relation Age of Onset     Other - See Comments Father 54     Accidental Head Injury     Alzheimer Disease Other      DIABETES Other      CANCER Other      LUNG     Respiratory Other      Emphysema     CANCER Other      LUNG         Current Outpatient Prescriptions   Medication Sig Dispense Refill     METHOCARBAMOL PO Take 750 mg by mouth 4 times daily       furosemide (LASIX) 20 MG tablet Take 1 tablet (20 mg) by mouth daily for 7 days 7 tablet 0     potassium chloride SA (K-DUR/KLOR-CON M) 10 MEQ CR tablet Take 2 tablets (20 mEq) by mouth daily for 7 days 14 tablet 0     oxyCODONE-acetaminophen (PERCOCET) 5-325 MG per tablet Take 1-2 tablets by mouth every 8 hours as needed for moderate to severe pain 30 tablet 0     nicotine polacrilex (NICORETTE) 4 MG gum Place 1 each (4 mg) inside cheek as needed for smoking cessation 100 each 2     ALPRAZolam (XANAX) 0.25 MG tablet Take 1 tablet (0.25 mg) by mouth 3 times daily as needed for anxiety 90 tablet 2     diltiazem HCl COATED BEADS (MATZIM LA) 240 MG TB24 Take 1 tablet by mouth daily 90 tablet 2     levothyroxine (SYNTHROID/LEVOTHROID) 112 MCG tablet Take 1 tablet (112 mcg) by mouth daily 90 tablet 1     ALBUTEROL 108 (90 BASE) MCG/ACT inhaler INHALE 2 PUFFS INTO THE LUNGS FOUR TIMES DAILY 17 g 2     No Known Allergies      Reviewed and updated as needed this visit by clinical staffAllergies  Meds  Problems       Reviewed and updated as needed this visit by Provider         ROS:  Constitutional, HEENT, cardiovascular, pulmonary, gi and gu systems are negative, except as otherwise noted.      OBJECTIVE:   /68 (BP Location: Left arm, Patient Position: Chair, Cuff Size: Adult Large)  Pulse 82  Temp 98  F (36.7  C) (Tympanic)  Resp 18  Ht 5' 5\" (1.651 m)  Wt 254 lb (115.2 kg)  LMP 09/03/2017 (Approximate)  SpO2 98%  BMI 42.27 kg/m2  Body mass index is 42.27 kg/(m^2).  GENERAL: healthy, alert " and no distress  PSYCH: mentation appears normal, affect normal/bright    Diagnostic Test Results:  none     ASSESSMENT/PLAN:       Tobacco Cessation:   reports that she has been smoking Cigarettes.  She has a 24.00 pack-year smoking history. She has never used smokeless tobacco.  Tobacco Cessation Action Plan: Information offered: Patient not interested at this time        1. Peripheral edema  Will use short course of furosemide.  Will add potassium as well.  Follow-up if no improvement noted.  - furosemide (LASIX) 20 MG tablet; Take 1 tablet (20 mg) by mouth daily for 7 days  Dispense: 7 tablet; Refill: 0  - potassium chloride SA (K-DUR/KLOR-CON M) 10 MEQ CR tablet; Take 2 tablets (20 mEq) by mouth daily for 7 days  Dispense: 14 tablet; Refill: 0    2. S/P lumbar spine operation  Refilled for small number.  - oxyCODONE-acetaminophen (PERCOCET) 5-325 MG per tablet; Take 1-2 tablets by mouth every 8 hours as needed for moderate to severe pain  Dispense: 30 tablet; Refill: 0    3. Tobacco abuse  Assistance offered.  - Tobacco Cessation - for Health Maintenance    4. Tobacco abuse counseling  As above.        Paz Maria MD  [facility]

## 2017-09-05 NOTE — PATIENT INSTRUCTIONS

## 2017-09-06 ASSESSMENT — ANXIETY QUESTIONNAIRES: GAD7 TOTAL SCORE: 2

## 2017-09-29 ENCOUNTER — TRANSFERRED RECORDS (OUTPATIENT)
Dept: HEALTH INFORMATION MANAGEMENT | Facility: HOSPITAL | Age: 47
End: 2017-09-29

## 2017-10-09 ENCOUNTER — OFFICE VISIT (OUTPATIENT)
Dept: FAMILY MEDICINE | Facility: OTHER | Age: 47
End: 2017-10-09
Attending: FAMILY MEDICINE
Payer: COMMERCIAL

## 2017-10-09 VITALS
RESPIRATION RATE: 18 BRPM | TEMPERATURE: 98.1 F | DIASTOLIC BLOOD PRESSURE: 78 MMHG | SYSTOLIC BLOOD PRESSURE: 110 MMHG | HEART RATE: 78 BPM | BODY MASS INDEX: 42.99 KG/M2 | OXYGEN SATURATION: 96 % | HEIGHT: 65 IN | WEIGHT: 258 LBS

## 2017-10-09 DIAGNOSIS — F41.9 ANXIETY: ICD-10-CM

## 2017-10-09 DIAGNOSIS — R60.0 PERIPHERAL EDEMA: Primary | ICD-10-CM

## 2017-10-09 LAB
ANION GAP SERPL CALCULATED.3IONS-SCNC: 8 MMOL/L (ref 3–14)
BUN SERPL-MCNC: 6 MG/DL (ref 7–30)
CALCIUM SERPL-MCNC: 8.9 MG/DL (ref 8.5–10.1)
CHLORIDE SERPL-SCNC: 106 MMOL/L (ref 94–109)
CO2 SERPL-SCNC: 26 MMOL/L (ref 20–32)
CREAT SERPL-MCNC: 0.67 MG/DL (ref 0.52–1.04)
GFR SERPL CREATININE-BSD FRML MDRD: >90 ML/MIN/1.7M2
GLUCOSE SERPL-MCNC: 94 MG/DL (ref 70–99)
POTASSIUM SERPL-SCNC: 4.3 MMOL/L (ref 3.4–5.3)
SODIUM SERPL-SCNC: 140 MMOL/L (ref 133–144)
TSH SERPL DL<=0.005 MIU/L-ACNC: 3.92 MU/L (ref 0.4–4)

## 2017-10-09 PROCEDURE — 36415 COLL VENOUS BLD VENIPUNCTURE: CPT | Performed by: FAMILY MEDICINE

## 2017-10-09 PROCEDURE — 84443 ASSAY THYROID STIM HORMONE: CPT | Performed by: FAMILY MEDICINE

## 2017-10-09 PROCEDURE — 80048 BASIC METABOLIC PNL TOTAL CA: CPT | Performed by: FAMILY MEDICINE

## 2017-10-09 PROCEDURE — 99213 OFFICE O/P EST LOW 20 MIN: CPT | Performed by: FAMILY MEDICINE

## 2017-10-09 RX ORDER — ALPRAZOLAM 0.25 MG
0.25 TABLET ORAL 3 TIMES DAILY PRN
Qty: 90 TABLET | Refills: 2 | Status: SHIPPED | OUTPATIENT
Start: 2017-10-09 | End: 2017-12-20

## 2017-10-09 ASSESSMENT — ANXIETY QUESTIONNAIRES
3. WORRYING TOO MUCH ABOUT DIFFERENT THINGS: SEVERAL DAYS
5. BEING SO RESTLESS THAT IT IS HARD TO SIT STILL: SEVERAL DAYS
4. TROUBLE RELAXING: SEVERAL DAYS
1. FEELING NERVOUS, ANXIOUS, OR ON EDGE: MORE THAN HALF THE DAYS
6. BECOMING EASILY ANNOYED OR IRRITABLE: SEVERAL DAYS
2. NOT BEING ABLE TO STOP OR CONTROL WORRYING: SEVERAL DAYS
7. FEELING AFRAID AS IF SOMETHING AWFUL MIGHT HAPPEN: NOT AT ALL
IF YOU CHECKED OFF ANY PROBLEMS ON THIS QUESTIONNAIRE, HOW DIFFICULT HAVE THESE PROBLEMS MADE IT FOR YOU TO DO YOUR WORK, TAKE CARE OF THINGS AT HOME, OR GET ALONG WITH OTHER PEOPLE: NOT DIFFICULT AT ALL
GAD7 TOTAL SCORE: 7

## 2017-10-09 ASSESSMENT — PATIENT HEALTH QUESTIONNAIRE - PHQ9: SUM OF ALL RESPONSES TO PHQ QUESTIONS 1-9: 6

## 2017-10-09 NOTE — MR AVS SNAPSHOT
"              After Visit Summary   10/9/2017    Selma Dubose    MRN: [id number]           Patient Information     Date Of Birth          1970        Visit Information        Provider Department      10/9/2017 11:30 AM aPz Maria MD [facility]        Today's Diagnoses     Peripheral edema    -  1    Anxiety           Follow-ups after your visit        Follow-up notes from your care team     Return if symptoms worsen or fail to improve.      Who to contact     If you have questions or need follow up information about today's clinic visit or your schedule please contact [facility] directly at [phone].  Normal or non-critical lab and imaging results will be communicated to you by MyChart, letter or phone within 4 business days after the clinic has received the results. If you do not hear from us within 7 days, please contact the clinic through BitInstanthart or phone. If you have a critical or abnormal lab result, we will notify you by phone as soon as possible.  Submit refill requests through Tribotek or call your pharmacy and they will forward the refill request to us. Please allow 3 business days for your refill to be completed.          Additional Information About Your Visit        MyChart Information     Tribotek gives you secure access to your electronic health record. If you see a primary care provider, you can also send messages to your care team and make appointments. If you have questions, please call your primary care clinic.  If you do not have a primary care provider, please call [phone] and they will assist you.        Care EveryWhere ID     This is your Care EveryWhere ID. This could be used by other organizations to access your [facility] medical records  [id number]        Your Vitals Were     Pulse Temperature Respirations Height Pulse Oximetry BMI (Body Mass Index)    78 98.1  F (36.7  C) (Tympanic) 18 5' 5\" (1.651 m) 96% 42.93 kg/m2       Blood Pressure " from Last 3 Encounters:   10/09/17 110/78   09/05/17 110/68   08/14/17 112/68    Weight from Last 3 Encounters:   10/09/17 258 lb (117 kg)   09/05/17 254 lb (115.2 kg)   08/14/17 254 lb (115.2 kg)              We Performed the Following     Basic metabolic panel     TSH          Today's Medication Changes          These changes are accurate as of: 10/9/17  1:13 PM.  If you have any questions, ask your nurse or doctor.               Stop taking these medicines if you haven't already. Please contact your care team if you have questions.     METHOCARBAMOL PO   Stopped by:  Paz Maria MD                Where to get your medicines      Some of these will need a paper prescription and others can be bought over the counter.  Ask your nurse if you have questions.     Bring a paper prescription for each of these medications     ALPRAZolam 0.25 MG tablet                Primary Care Provider Office Phone # Fax #    Paz Maria [phone] [facility] [address]        Equal Access to Services     [facility]: Hadii aad ku hadasho [place], waaxda luqadaha, qaybta kaalmaoctavio edwards, prema la . So [facility] [phone].    ATENCIÓN: Si habla español, tiene a chaves disposición servicios gratuitos de asistencia lingüística. Llame al [phone].    We comply with applicable federal civil rights laws and [place] laws. We do not discriminate on the basis of race, color, national origin, age, disability, sex, sexual orientation, or gender identity.            Thank you!     Thank you for choosing [facility]  for your care. Our goal is always to provide you with excellent care. Hearing back from our patients is one way we can continue to improve our services. Please take a few minutes to complete the written survey that you may receive in the mail after your visit with us. Thank you!             Your Updated Medication List -  Protect others around you: Learn how to safely use, store and throw away your medicines at www.disposemymeds.org.          This list is accurate as of: 10/9/17  1:13 PM.  Always use your most recent med list.                   Brand Name Dispense Instructions for use Diagnosis    ALPRAZolam 0.25 MG tablet    XANAX    90 tablet    Take 1 tablet (0.25 mg) by mouth 3 times daily as needed for anxiety    Anxiety       diltiazem HCl COATED BEADS 240 MG Tb24    MATZIM LA    90 tablet    Take 1 tablet by mouth daily    Tachycardia       furosemide 20 MG tablet    LASIX    7 tablet    Take 1 tablet (20 mg) by mouth daily for 7 days    Peripheral edema       levothyroxine 112 MCG tablet    SYNTHROID/LEVOTHROID    90 tablet    Take 1 tablet (112 mcg) by mouth daily    Hypothyroidism, unspecified type       nicotine polacrilex 4 MG gum    NICORETTE    100 each    Place 1 each (4 mg) inside cheek as needed for smoking cessation    Tobacco use       order for DME     1 Device    Knee high compression stockings, 15-20 mmHg compression Sock wells (recent low back surgery)    Lower extremity edema       oxyCODONE-acetaminophen 5-325 MG per tablet    PERCOCET    30 tablet    Take 1-2 tablets by mouth every 8 hours as needed for moderate to severe pain    S/P lumbar spine operation       PROAIR  (90 BASE) MCG/ACT Inhaler   Generic drug:  albuterol     17 g    INHALE 2 PUFFS INTO THE LUNGS FOUR TIMES DAILY    SOB (shortness of breath)

## 2017-10-10 ENCOUNTER — TELEPHONE (OUTPATIENT)
Dept: FAMILY MEDICINE | Facility: OTHER | Age: 47
End: 2017-10-10

## 2017-10-10 DIAGNOSIS — R60.0 PERIPHERAL EDEMA: Primary | ICD-10-CM

## 2017-10-10 ASSESSMENT — ANXIETY QUESTIONNAIRES: GAD7 TOTAL SCORE: 7

## 2017-10-16 DIAGNOSIS — E03.9 HYPOTHYROIDISM, UNSPECIFIED TYPE: ICD-10-CM

## 2017-10-16 DIAGNOSIS — M15.9 OSTEOARTHRITIS OF MULTIPLE JOINTS, UNSPECIFIED OSTEOARTHRITIS TYPE: ICD-10-CM

## 2017-10-16 DIAGNOSIS — M19.90 OSTEOARTHRITIS: Primary | ICD-10-CM

## 2017-10-16 RX ORDER — LEVOTHYROXINE SODIUM 112 UG/1
112 TABLET ORAL DAILY
Qty: 90 TABLET | Refills: 1 | Status: SHIPPED | OUTPATIENT
Start: 2017-10-16 | End: 2018-10-30

## 2017-10-16 RX ORDER — IBUPROFEN 600 MG/1
600 TABLET, FILM COATED ORAL EVERY 8 HOURS PRN
Qty: 90 TABLET | Refills: 1 | Status: SHIPPED | OUTPATIENT
Start: 2017-10-16 | End: 2018-01-12

## 2017-10-16 NOTE — TELEPHONE ENCOUNTER
Ibuprofen      Last Written Prescription Date: 8/3/17 End 9/5/17  Last Quantity: 90, # refills: 1  Last Office Visit with G, P or [facility] prescribing provider: 10/9/17    Dr. Ramsey I ordered the Ibuprofen, please sign if you approve. Demi BRADY LPN discontinued it on 9/5/17.        Creatinine   Date Value Ref Range Status   10/09/2017 0.67 0.52 - 1.04 mg/dL Final     Lab Results   Component Value Date    AST 29 08/14/2017     Lab Results   Component Value Date    ALT 62 08/14/2017     BP Readings from Last 3 Encounters:   10/09/17 110/78   09/05/17 110/68   08/14/17 112/68

## 2017-12-04 ENCOUNTER — TRANSFERRED RECORDS (OUTPATIENT)
Dept: HEALTH INFORMATION MANAGEMENT | Facility: HOSPITAL | Age: 47
End: 2017-12-04

## 2017-12-20 DIAGNOSIS — F41.9 ANXIETY: ICD-10-CM

## 2017-12-21 RX ORDER — ALPRAZOLAM 0.25 MG
TABLET ORAL
Qty: 90 TABLET | Refills: 2 | Status: SHIPPED | OUTPATIENT
Start: 2017-12-21 | End: 2018-03-07

## 2018-01-05 ENCOUNTER — OFFICE VISIT (OUTPATIENT)
Dept: FAMILY MEDICINE | Facility: OTHER | Age: 48
End: 2018-01-05
Attending: NURSE PRACTITIONER
Payer: COMMERCIAL

## 2018-01-05 ENCOUNTER — RADIANT APPOINTMENT (OUTPATIENT)
Dept: GENERAL RADIOLOGY | Facility: OTHER | Age: 48
End: 2018-01-05
Attending: NURSE PRACTITIONER
Payer: COMMERCIAL

## 2018-01-05 VITALS
DIASTOLIC BLOOD PRESSURE: 80 MMHG | WEIGHT: 259 LBS | HEIGHT: 65 IN | BODY MASS INDEX: 43.15 KG/M2 | TEMPERATURE: 98.7 F | RESPIRATION RATE: 14 BRPM | SYSTOLIC BLOOD PRESSURE: 120 MMHG | HEART RATE: 80 BPM

## 2018-01-05 DIAGNOSIS — M25.561 ACUTE PAIN OF RIGHT KNEE: Primary | ICD-10-CM

## 2018-01-05 DIAGNOSIS — Z98.890 S/P LUMBAR SPINE OPERATION: ICD-10-CM

## 2018-01-05 PROCEDURE — 73562 X-RAY EXAM OF KNEE 3: CPT | Mod: TC

## 2018-01-05 PROCEDURE — 99214 OFFICE O/P EST MOD 30 MIN: CPT | Performed by: NURSE PRACTITIONER

## 2018-01-05 RX ORDER — OXYCODONE AND ACETAMINOPHEN 5; 325 MG/1; MG/1
1-2 TABLET ORAL EVERY 8 HOURS PRN
Qty: 20 TABLET | Refills: 0 | Status: SHIPPED | OUTPATIENT
Start: 2018-01-05 | End: 2018-08-21

## 2018-01-05 ASSESSMENT — PATIENT HEALTH QUESTIONNAIRE - PHQ9: SUM OF ALL RESPONSES TO PHQ QUESTIONS 1-9: 4

## 2018-01-05 ASSESSMENT — ANXIETY QUESTIONNAIRES
2. NOT BEING ABLE TO STOP OR CONTROL WORRYING: SEVERAL DAYS
IF YOU CHECKED OFF ANY PROBLEMS ON THIS QUESTIONNAIRE, HOW DIFFICULT HAVE THESE PROBLEMS MADE IT FOR YOU TO DO YOUR WORK, TAKE CARE OF THINGS AT HOME, OR GET ALONG WITH OTHER PEOPLE: NOT DIFFICULT AT ALL
1. FEELING NERVOUS, ANXIOUS, OR ON EDGE: SEVERAL DAYS
4. TROUBLE RELAXING: SEVERAL DAYS
5. BEING SO RESTLESS THAT IT IS HARD TO SIT STILL: NOT AT ALL
3. WORRYING TOO MUCH ABOUT DIFFERENT THINGS: NOT AT ALL
GAD7 TOTAL SCORE: 4
7. FEELING AFRAID AS IF SOMETHING AWFUL MIGHT HAPPEN: NOT AT ALL
6. BECOMING EASILY ANNOYED OR IRRITABLE: SEVERAL DAYS

## 2018-01-05 ASSESSMENT — PAIN SCALES - GENERAL: PAINLEVEL: MODERATE PAIN (5)

## 2018-01-05 NOTE — PROGRESS NOTES
SUBJECTIVE:   Selma Dubose is a 47 year old female who presents to clinic today for the following health issues:      Right knee pain    Onset: 2 month    Description:   Location: right posterior knee,  Character: Sharp and Dull ache    Intensity: severe    Progression of Symptoms: worse    Accompanying Signs & Symptoms:  Other symptoms: weakness of knee    History:     Previous similar pain: YES, prior to back surgery in Aug      Precipitating factors:   Trauma or overuse: no     Alleviating factors:  Improved pull on compression sleeve      No recent injury noted  Knee feels as though it will buckle outward when she walks.          Problem list and histories reviewed & adjusted, as indicated.  Additional history: as documented    Patient Active Problem List   Diagnosis     Acquired hypothyroidism     Anxiety     History of tachycardia     Obstructive sleep apnea     Tobacco dependence     Spina bifida with hydrocephalus (H)     history of brain aneurism     Tension-type headache, not intractable     history of Noncompliance with medication regimen     Acute midline low back pain with sciatica     Osteoarthritis of spine with radiculopathy, lumbar region     Lumbosacral radiculopathy at S1     Spinal stenosis of lumbar region     Moderate persistent asthma without complication     Morbid obesity (H)     Past Surgical History:   Procedure Laterality Date     BACK SURGERY  08/29/2017    [facility]- L4-L5 laminectomy and decompression L5-S1 transforaminal lumbar fusion, L5-S! diskectomy, L4-S! 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,nelida type 1 syndrome,       Social History   Substance Use Topics     Smoking status: Current Every Day Smoker     Packs/day: 1.00     Years: 24.00     Types: Cigarettes     Smokeless tobacco: Never Used      Comment: Tried to Quit (NO);  "Passive Exposure (NO)     Alcohol use Yes      Comment: 3 Beers - WEEKLY     Family History   Problem Relation Age of Onset     Other - See Comments Father 54     Accidental Head Injury     Alzheimer Disease Other      DIABETES Other      CANCER Other      LUNG     Respiratory Other      Emphysema     CANCER Other      LUNG             Reviewed and updated as needed this visit by clinical staffTobacco  Allergies  Meds  Med Hx  Surg Hx  Fam Hx  Soc Hx      Reviewed and updated as needed this visit by Provider         ROS:  Constitutional, HEENT, cardiovascular, pulmonary, gi and gu systems are negative, except as otherwise noted.      OBJECTIVE:   /80 (BP Location: Right arm, Patient Position: Sitting, Cuff Size: Adult Large)  Pulse 80  Temp 98.7  F (37.1  C)  Resp 14  Ht 5' 5\" (1.651 m)  Wt 259 lb (117.5 kg)  LMP 12/29/2017  BMI 43.1 kg/m2  Body mass index is 43.1 kg/(m^2).     GENERAL: healthy, alert and no distress  EYES: Eyes grossly normal to inspection, PERRL and conjunctivae and sclerae normal  HENT: ear canals and TM's normal, nose and mouth without ulcers or lesions  RESP: lungs clear to auscultation - no rales, rhonchi or wheezes  CV: regular rate and rhythm, normal S1 S2, no S3 or S4, no murmur, click or rub, no peripheral edema and peripheral pulses strong  MS: right knee - lateral pain, popliteal tenderness -   SKIN: no suspicious lesions or rashes  PSYCH: mentation appears normal, affect normal/bright      Genaro Seaman MD 1/5/2018            Narrative             PROCEDURE:  XR KNEE RT 3 VW    HISTORY: ; Right knee pain    COMPARISON:  None.    TECHNIQUE:  3 views of the right knee were obtained.    FINDINGS:  No fracture or dislocation is identified. There is some  mild joint space narrowing at the medial femoral tibial articulation.  There are degenerative osteophytes arising from the lateral femoral  tibial articulation. Degenerative osteophytes are seen arising from  the " patellofemoral articulation.             Impression             IMPRESSION: Degenerative changes of the right knee      JOELLE FONSECA MD          ASSESSMENT/PLAN:     1. Acute pain of right knee  - XR KNEE RIGHT 3 VIEWS (Clinic Performed)  - ORTHOPEDICS ADULT REFERRAL  - Ice  - Rest  - Avoid activities that reproduce pain  - Elevate as above    Nga Yousif NP  [facility]

## 2018-01-05 NOTE — PATIENT INSTRUCTIONS
Genaro Fonseca MD 1/5/2018            Narrative             PROCEDURE:  XR KNEE RT 3 VW    HISTORY: ; Right knee pain    COMPARISON:  None.    TECHNIQUE:  3 views of the right knee were obtained.    FINDINGS:  No fracture or dislocation is identified. There is some  mild joint space narrowing at the medial femoral tibial articulation.  There are degenerative osteophytes arising from the lateral femoral  tibial articulation. Degenerative osteophytes are seen arising from  the patellofemoral articulation.             Impression             IMPRESSION: Degenerative changes of the right knee      GENARO FONSECA MD          ASSESSMENT/PLAN:     1. Acute pain of right knee  - XR KNEE RIGHT 3 VIEWS (Clinic Performed)  - ORTHOPEDICS ADULT REFERRAL  - Ice  - Rest  - Avoid activities that reproduce pain  - Elevate as above    Nga Yousif NP  [facility]

## 2018-01-05 NOTE — NURSING NOTE
"Chief Complaint   Patient presents with     Musculoskeletal Problem       Initial /80 (BP Location: Right arm, Patient Position: Sitting, Cuff Size: Adult Large)  Pulse 80  Temp 98.7  F (37.1  C)  Resp 14  Ht 5' 5\" (1.651 m)  Wt 259 lb (117.5 kg)  LMP 12/29/2017  BMI 43.1 kg/m2 Estimated body mass index is 43.1 kg/(m^2) as calculated from the following:    Height as of this encounter: 5' 5\" (1.651 m).    Weight as of this encounter: 259 lb (117.5 kg).  Medication Reconciliation: complete     Eimlia Diane      "

## 2018-01-05 NOTE — MR AVS SNAPSHOT
After Visit Summary   1/5/2018    Selma Dubose    MRN: [id number]           Patient Information     Date Of Birth          1970        Visit Information        Provider Department      1/5/2018 2:45 PM Nga Yousif NP [facility]        Today's Diagnoses     Acute pain of right knee    -  1      Care Instructions      Genaro Seaman MD 1/5/2018            Narrative             PROCEDURE:  XR KNEE RT 3 VW    HISTORY: ; Right knee pain    COMPARISON:  None.    TECHNIQUE:  3 views of the right knee were obtained.    FINDINGS:  No fracture or dislocation is identified. There is some  mild joint space narrowing at the medial femoral tibial articulation.  There are degenerative osteophytes arising from the lateral femoral  tibial articulation. Degenerative osteophytes are seen arising from  the patellofemoral articulation.             Impression             IMPRESSION: Degenerative changes of the right knee      GENARO SEAMAN MD          ASSESSMENT/PLAN:     1. Acute pain of right knee  - XR KNEE RIGHT 3 VIEWS (Clinic Performed)  - ORTHOPEDICS ADULT REFERRAL  - Ice  - Rest  - Avoid activities that reproduce pain  - Elevate as above    Nga Yousif NP  [facility]            Follow-ups after your visit        Additional Services     ORTHOPEDICS ADULT REFERRAL       Your provider has referred you to: Ortho - first available    Please be aware that coverage of these services is subject to the terms and limitations of your health insurance plan.  Call member services at your health plan with any benefit or coverage questions.      Please bring the following to your appointment:    >>   Any x-rays, CTs or MRIs which have been performed.  Contact the facility where they were done to arrange for  prior to your scheduled appointment.    >>   List of current medications   >>   This referral request   >>   Any documents/labs given to you for this referral                 "  Your next 10 appointments already scheduled     Jan 08, 2018  1:45 PM CST   (Arrive by 1:30 PM)   SHORT with Paz Maria MD   [facility] ([facility] )    [address]  [place] [address]   [phone]              Who to contact     If you have questions or need follow up information about today's clinic visit or your schedule please contact [facility] directly at [phone].  Normal or non-critical lab and imaging results will be communicated to you by HotDeskhart, letter or phone within 4 business days after the clinic has received the results. If you do not hear from us within 7 days, please contact the clinic through [employer] or phone. If you have a critical or abnormal lab result, we will notify you by phone as soon as possible.  Submit refill requests through 4 the stars or call your pharmacy and they will forward the refill request to us. Please allow 3 business days for your refill to be completed.          Additional Information About Your Visit        4 the stars Information     4 the stars gives you secure access to your electronic health record. If you see a primary care provider, you can also send messages to your care team and make appointments. If you have questions, please call your primary care clinic.  If you do not have a primary care provider, please call [phone] and they will assist you.        Care EveryWhere ID     This is your Care EveryWhere ID. This could be used by other organizations to access your [facility] medical records  [id number]        Your Vitals Were     Pulse Temperature Respirations Height Last Period BMI (Body Mass Index)    80 98.7  F (37.1  C) 14 5' 5\" (1.651 m) 12/29/2017 43.1 kg/m2       Blood Pressure from Last 3 Encounters:   01/05/18 120/80   10/09/17 110/78   09/05/17 110/68    Weight from Last 3 Encounters:   01/05/18 259 lb (117.5 kg)   10/09/17 258 lb (117 kg)   09/05/17 254 lb (115.2 kg)         "      We Performed the Following     ORTHOPEDICS ADULT REFERRAL     XR KNEE RIGHT 3 VIEWS (Clinic Performed)          Today's Medication Changes          These changes are accurate as of: 1/5/18  4:34 PM.  If you have any questions, ask your nurse or doctor.               Stop taking these medicines if you haven't already. Please contact your care team if you have questions.     furosemide 20 MG tablet   Commonly known as:  LASIX   Stopped by:  Nga Yousif NP           nicotine polacrilex 4 MG gum   Commonly known as:  NICORETTE   Stopped by:  Nga Yousif NP                    Primary Care Provider Office Phone # Fax #    Paz [address] [phone] [address] [facility] [address]        Equal Access to Services     [facility]: Tammy Hollins, waloc tapia, qaybta kaalmada grace, prema velasquez. So [facility] [phone].    ATENCIÓN: Si habla español, tiene a chaves disposición servicios gratuitos de asistencia lingüística. [facility] [phone].    We comply with applicable federal civil rights laws and [place] laws. We do not discriminate on the basis of race, color, national origin, age, disability, sex, sexual orientation, or gender identity.            Thank you!     Thank you for choosing [facility]  for your care. Our goal is always to provide you with excellent care. Hearing back from our patients is one way we can continue to improve our services. Please take a few minutes to complete the written survey that you may receive in the mail after your visit with us. Thank you!             Your Updated Medication List - Protect others around you: Learn how to safely use, store and throw away your medicines at www.disposemymeds.org.          This list is accurate as of: 1/5/18  4:34 PM.  Always use your most recent med list.                   Brand Name Dispense Instructions for use Diagnosis    ALPRAZolam 0.25  MG tablet    XANAX    90 tablet    TAKE 1 TABLET BY MOUTH THREE TIMES DAILY AS NEEDED FOR ANXIETY    Anxiety       clindamycin 1 % lotion    CLINDAMAX    60 mL    Apply topically 2 times daily    Boil, face       diltiazem HCl COATED BEADS 240 MG Tb24    MATZIM LA    90 tablet    Take 1 tablet by mouth daily    Tachycardia       ibuprofen 600 MG tablet    ADVIL/MOTRIN    90 tablet    Take 1 tablet (600 mg) by mouth every 8 hours as needed for moderate pain    Osteoarthritis of multiple joints, unspecified osteoarthritis type       levothyroxine 112 MCG tablet    SYNTHROID/LEVOTHROID    90 tablet    Take 1 tablet (112 mcg) by mouth daily    Hypothyroidism, unspecified type       order for DME     1 Device    Knee high compression stockings, 15-20 mmHg compression Sock wells (recent low back surgery)    Lower extremity edema       oxyCODONE-acetaminophen 5-325 MG per tablet    PERCOCET    30 tablet    Take 1-2 tablets by mouth every 8 hours as needed for moderate to severe pain    S/P lumbar spine operation       PROAIR  (90 BASE) MCG/ACT Inhaler   Generic drug:  albuterol     17 g    INHALE 2 PUFFS INTO THE LUNGS FOUR TIMES DAILY    SOB (shortness of breath)

## 2018-01-06 ASSESSMENT — ANXIETY QUESTIONNAIRES: GAD7 TOTAL SCORE: 4

## 2018-01-08 ENCOUNTER — OFFICE VISIT (OUTPATIENT)
Dept: FAMILY MEDICINE | Facility: OTHER | Age: 48
End: 2018-01-08
Attending: FAMILY MEDICINE
Payer: COMMERCIAL

## 2018-01-08 VITALS
OXYGEN SATURATION: 96 % | RESPIRATION RATE: 14 BRPM | HEIGHT: 65 IN | WEIGHT: 259 LBS | HEART RATE: 80 BPM | TEMPERATURE: 98.4 F | BODY MASS INDEX: 43.15 KG/M2

## 2018-01-08 DIAGNOSIS — G89.29 CHRONIC PAIN OF RIGHT KNEE: Primary | ICD-10-CM

## 2018-01-08 DIAGNOSIS — M25.561 CHRONIC PAIN OF RIGHT KNEE: Primary | ICD-10-CM

## 2018-01-08 DIAGNOSIS — R00.0 TACHYCARDIA: ICD-10-CM

## 2018-01-08 PROCEDURE — 99214 OFFICE O/P EST MOD 30 MIN: CPT | Performed by: FAMILY MEDICINE

## 2018-01-08 RX ORDER — METOPROLOL SUCCINATE 25 MG/1
25 TABLET, EXTENDED RELEASE ORAL DAILY
Qty: 30 TABLET | Refills: 1 | Status: SHIPPED | OUTPATIENT
Start: 2018-01-08 | End: 2018-07-31

## 2018-01-08 ASSESSMENT — ANXIETY QUESTIONNAIRES
7. FEELING AFRAID AS IF SOMETHING AWFUL MIGHT HAPPEN: NOT AT ALL
IF YOU CHECKED OFF ANY PROBLEMS ON THIS QUESTIONNAIRE, HOW DIFFICULT HAVE THESE PROBLEMS MADE IT FOR YOU TO DO YOUR WORK, TAKE CARE OF THINGS AT HOME, OR GET ALONG WITH OTHER PEOPLE: NOT DIFFICULT AT ALL
6. BECOMING EASILY ANNOYED OR IRRITABLE: SEVERAL DAYS
2. NOT BEING ABLE TO STOP OR CONTROL WORRYING: SEVERAL DAYS
3. WORRYING TOO MUCH ABOUT DIFFERENT THINGS: SEVERAL DAYS
5. BEING SO RESTLESS THAT IT IS HARD TO SIT STILL: NOT AT ALL
1. FEELING NERVOUS, ANXIOUS, OR ON EDGE: SEVERAL DAYS
GAD7 TOTAL SCORE: 5

## 2018-01-08 ASSESSMENT — PATIENT HEALTH QUESTIONNAIRE - PHQ9
SUM OF ALL RESPONSES TO PHQ QUESTIONS 1-9: 5
5. POOR APPETITE OR OVEREATING: SEVERAL DAYS

## 2018-01-08 NOTE — NURSING NOTE
"Chief Complaint   Patient presents with     RECHECK       Initial Pulse 80  Temp 98.4  F (36.9  C) (Tympanic)  Resp 14  Ht 5' 5\" (1.651 m)  Wt 259 lb (117.5 kg)  LMP 12/29/2017  SpO2 96%  BMI 43.1 kg/m2 Estimated body mass index is 43.1 kg/(m^2) as calculated from the following:    Height as of this encounter: 5' 5\" (1.651 m).    Weight as of this encounter: 259 lb (117.5 kg).  Medication Reconciliation: complete   Luz Eldridge      "

## 2018-01-08 NOTE — MR AVS SNAPSHOT
After Visit Summary   1/8/2018    eSlma Dubose    MRN: [id number]           Patient Information     Date Of Birth          1970        Visit Information        Provider Department      1/8/2018 1:45 PM Paz Maria MD [facility]        Today's Diagnoses     Chronic pain of right knee    -  1    Tachycardia           Follow-ups after your visit        Follow-up notes from your care team     Return if symptoms worsen or fail to improve.      Future tests that were ordered for you today     Open Future Orders        Priority Expected Expires Ordered    MR Knee Right w/o Contrast Routine  1/8/2019 1/8/2018            Who to contact     If you have questions or need follow up information about today's clinic visit or your schedule please contact [facility] directly at [phone].  Normal or non-critical lab and imaging results will be communicated to you by MyChart, letter or phone within 4 business days after the clinic has received the results. If you do not hear from us within 7 days, please contact the clinic through MyChart or phone. If you have a critical or abnormal lab result, we will notify you by phone as soon as possible.  Submit refill requests through [employer] or call your pharmacy and they will forward the refill request to us. Please allow 3 business days for your refill to be completed.          Additional Information About Your Visit        MyChart Information     [employer]i gives you secure access to your electronic health record. If you see a primary care provider, you can also send messages to your care team and make appointments. If you have questions, please call your primary care clinic.  If you do not have a primary care provider, please call [phone] and they will assist you.        Care EveryWhere ID     This is your Care EveryWhere ID. This could be used by other organizations to access your [facility] medical records  [id number]       "  Your Vitals Were     Pulse Temperature Respirations Height Last Period Pulse Oximetry    80 98.4  F (36.9  C) (Tympanic) 14 5' 5\" (1.651 m) 12/29/2017 96%    BMI (Body Mass Index)                   43.1 kg/m2            Blood Pressure from Last 3 Encounters:   01/05/18 120/80   10/09/17 110/78   09/05/17 110/68    Weight from Last 3 Encounters:   01/08/18 259 lb (117.5 kg)   01/05/18 259 lb (117.5 kg)   10/09/17 258 lb (117 kg)                 Today's Medication Changes          These changes are accurate as of: 1/8/18 11:59 PM.  If you have any questions, ask your nurse or doctor.               Start taking these medicines.        Dose/Directions    metoprolol 25 MG 24 hr tablet   Commonly known as:  TOPROL-XL   Used for:  Tachycardia   Started by:  Paz Maria MD        Dose:  25 mg   Take 1 tablet (25 mg) by mouth daily   Quantity:  30 tablet   Refills:  1         Stop taking these medicines if you haven't already. Please contact your care team if you have questions.     diltiazem HCl COATED BEADS 240 MG Tb24   Commonly known as:  MATZIM LA   Stopped by:  Paz Maria MD                Where to get your medicines      These medications were sent to [facility] [address]  AT [facility] [address]  [address] [facility] [address]     Phone:  [phone]     metoprolol 25 MG 24 hr tablet                Primary Care Provider Office Phone # Fax #    Paz Maria [phone] [address] [facility] [address]        Equal Access to Services     [facility] MELONY AH: Hadii annalee mcintyre [place], waaxda luqadaha, qaybta kaalmada adeanthonyyaoctavio, prema velasquez. So [facility] [phone].    ATENCIÓN: Si habla español, tiene a chaves disposición servicios gratuitos de asistencia lingüística. Llame al [phone].    We comply with applicable federal civil rights laws and [place] laws. We do not " discriminate on the basis of race, color, national origin, age, disability, sex, sexual orientation, or gender identity.            Thank you!     Thank you for choosing [facility]  for your care. Our goal is always to provide you with excellent care. Hearing back from our patients is one way we can continue to improve our services. Please take a few minutes to complete the written survey that you may receive in the mail after your visit with us. Thank you!             Your Updated Medication List - Protect others around you: Learn how to safely use, store and throw away your medicines at www.disposemymeds.org.          This list is accurate as of: 1/8/18 11:59 PM.  Always use your most recent med list.                   Brand Name Dispense Instructions for use Diagnosis    ALPRAZolam 0.25 MG tablet    XANAX    90 tablet    TAKE 1 TABLET BY MOUTH THREE TIMES DAILY AS NEEDED FOR ANXIETY    Anxiety       clindamycin 1 % lotion    CLINDAMAX    60 mL    Apply topically 2 times daily    Boil, face       ibuprofen 600 MG tablet    ADVIL/MOTRIN    90 tablet    Take 1 tablet (600 mg) by mouth every 8 hours as needed for moderate pain    Osteoarthritis of multiple joints, unspecified osteoarthritis type       levothyroxine 112 MCG tablet    SYNTHROID/LEVOTHROID    90 tablet    Take 1 tablet (112 mcg) by mouth daily    Hypothyroidism, unspecified type       metoprolol 25 MG 24 hr tablet    TOPROL-XL    30 tablet    Take 1 tablet (25 mg) by mouth daily    Tachycardia       order for DME     1 Device    Knee high compression stockings, 15-20 mmHg compression Sock wells (recent low back surgery)    Lower extremity edema       oxyCODONE-acetaminophen 5-325 MG per tablet    PERCOCET    20 tablet    Take 1-2 tablets by mouth every 8 hours as needed for moderate to severe pain    S/P lumbar spine operation       PROAIR  (90 BASE) MCG/ACT Inhaler   Generic drug:  albuterol     17 g    INHALE 2 PUFFS INTO THE  LUNGS FOUR TIMES DAILY    SOB (shortness of breath)

## 2018-01-08 NOTE — PROGRESS NOTES
SUBJECTIVE:   Selma Dubose is a 47 year old female who presents to clinic today for the following health issues:      Musculoskeletal problem/pain      Duration: bad before surgery, still not good    Description  Location: right knee    Intensity:  moderate    Accompanying signs and symptoms: turning out, buckling, some swelling    History  Previous similar problem: YES- ongoing issue  Previous evaluation:  x-ray    Precipitating or alleviating factors:  Trauma or overuse: YES  Aggravating factors include: standing and walking    Therapies tried and outcome: rest/inactivity, ice and NSAID - ibuprofen    Patient states a MRI was mentioned, and she wonders if that should be done.      Patient states she was told her calcium channel blocker is causing her swelling.  She was started on it for palpitations, and wonders if there is another alternative.      Problem list and histories reviewed & adjusted, as indicated.  Additional history: as documented    Patient Active Problem List   Diagnosis     Acquired hypothyroidism     Anxiety     History of tachycardia     Obstructive sleep apnea     Tobacco dependence     Spina bifida with hydrocephalus (H)     history of brain aneurism     Tension-type headache, not intractable     history of Noncompliance with medication regimen     Acute midline low back pain with sciatica     Osteoarthritis of spine with radiculopathy, lumbar region     Lumbosacral radiculopathy at S1     Spinal stenosis of lumbar region     Moderate persistent asthma without complication     Morbid obesity (H)     Past Surgical History:   Procedure Laterality Date     BACK SURGERY  08/29/2017    [facility]- L4-L5 laminectomy and decompression L5-S1 transforaminal lumbar fusion, L5-S! diskectomy, L4-S! 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,nelida type 1  "syndrome,       Social History   Substance Use Topics     Smoking status: Current Every Day Smoker     Packs/day: 1.00     Years: 24.00     Types: Cigarettes     Smokeless tobacco: Never Used      Comment: Tried to Quit (NO); Passive Exposure (NO)     Alcohol use Yes      Comment: 3 Beers - WEEKLY     Family History   Problem Relation Age of Onset     Other - See Comments Father 54     Accidental Head Injury     Alzheimer Disease Other      DIABETES Other      CANCER Other      LUNG     Respiratory Other      Emphysema     CANCER Other      LUNG         Current Outpatient Prescriptions   Medication Sig Dispense Refill     oxyCODONE-acetaminophen (PERCOCET) 5-325 MG per tablet Take 1-2 tablets by mouth every 8 hours as needed for moderate to severe pain 20 tablet 0     ALPRAZolam (XANAX) 0.25 MG tablet TAKE 1 TABLET BY MOUTH THREE TIMES DAILY AS NEEDED FOR ANXIETY 90 tablet 2     clindamycin (CLINDAMAX) 1 % lotion Apply topically 2 times daily 60 mL 11     ibuprofen (ADVIL/MOTRIN) 600 MG tablet Take 1 tablet (600 mg) by mouth every 8 hours as needed for moderate pain 90 tablet 1     levothyroxine (SYNTHROID/LEVOTHROID) 112 MCG tablet Take 1 tablet (112 mcg) by mouth daily 90 tablet 1     order for DME Knee high compression stockings, 15-20 mmHg compression  Sock wells (recent low back surgery) 1 Device 0     diltiazem HCl COATED BEADS (MATZIM LA) 240 MG TB24 Take 1 tablet by mouth daily 90 tablet 2     ALBUTEROL 108 (90 BASE) MCG/ACT inhaler INHALE 2 PUFFS INTO THE LUNGS FOUR TIMES DAILY 17 g 2     No Known Allergies      Reviewed and updated as needed this visit by clinical staffTobacco  Allergies  Meds       Reviewed and updated as needed this visit by Provider         ROS:  Constitutional, HEENT, cardiovascular, pulmonary, gi and gu systems are negative, except as otherwise noted.      OBJECTIVE:   Pulse 80  Temp 98.4  F (36.9  C) (Tympanic)  Resp 14  Ht 5' 5\" (1.651 m)  Wt 259 lb (117.5 kg)  LMP " 12/29/2017  SpO2 96%  BMI 43.1 kg/m2  Body mass index is 43.1 kg/(m^2).  GENERAL: healthy, alert and no distress  PSYCH: mentation appears normal, affect normal/bright    Diagnostic Test Results:  none     ASSESSMENT/PLAN:       1. Chronic pain of right knee  MRI ordered, follow-up with results when available.  Patient is seeing Dr. Pfeiffer later this week.  - MR Knee Right w/o Contrast; Future    2. Tachycardia  Stop diltiazem and start Metoprolol.  Follow-up in one month, sooner as needed.  - metoprolol (TOPROL-XL) 25 MG 24 hr tablet; Take 1 tablet (25 mg) by mouth daily  Dispense: 30 tablet; Refill: 1        Paz Maria MD  [facility]

## 2018-01-09 ASSESSMENT — ANXIETY QUESTIONNAIRES: GAD7 TOTAL SCORE: 5

## 2018-01-12 ENCOUNTER — TRANSFERRED RECORDS (OUTPATIENT)
Dept: HEALTH INFORMATION MANAGEMENT | Facility: HOSPITAL | Age: 48
End: 2018-01-12

## 2018-01-12 DIAGNOSIS — M15.9 OSTEOARTHRITIS OF MULTIPLE JOINTS, UNSPECIFIED OSTEOARTHRITIS TYPE: ICD-10-CM

## 2018-01-16 RX ORDER — IBUPROFEN 600 MG/1
TABLET, FILM COATED ORAL
Qty: 90 TABLET | Refills: 5 | Status: SHIPPED | OUTPATIENT
Start: 2018-01-16 | End: 2018-12-07

## 2018-01-16 NOTE — TELEPHONE ENCOUNTER
ibuprofen      Last Written Prescription Date:  10/16/17  Last Fill Quantity: 90,   # refills: 1  Last Office Visit: 1/8/18  Future Office visit: none

## 2018-02-15 DIAGNOSIS — R00.0 TACHYCARDIA: ICD-10-CM

## 2018-02-16 RX ORDER — DILTIAZEM HYDROCHLORIDE 240 MG/1
TABLET, EXTENDED RELEASE ORAL
Qty: 90 TABLET | Refills: 2 | Status: SHIPPED | OUTPATIENT
Start: 2018-02-16 | End: 2018-10-30

## 2018-02-16 NOTE — TELEPHONE ENCOUNTER
MATZIM LA TABS 240MG     Last Written Prescription Date:  1/30/17  Last Fill Quantity: 90 tabs,   # refills: 0  Last Office Visit: 1/8/18  Future Office visit:            Never smoker

## 2018-02-22 ENCOUNTER — TELEPHONE (OUTPATIENT)
Dept: FAMILY MEDICINE | Facility: OTHER | Age: 48
End: 2018-02-22

## 2018-02-22 NOTE — TELEPHONE ENCOUNTER
PA completed on Covermymeds and sent to [facility] for the medication Synthroid 112 mcg tablet.  Will wait for determination.  Will respond with determination with in 24 - 72 hours.  PA was previously approved from 3-6-17 until 3-6-18 per Epic media tab.

## 2018-03-06 DIAGNOSIS — F41.9 ANXIETY: ICD-10-CM

## 2018-03-06 RX ORDER — ALPRAZOLAM 0.25 MG
TABLET ORAL
Qty: 90 TABLET | Refills: 0 | OUTPATIENT
Start: 2018-03-06

## 2018-03-06 NOTE — TELEPHONE ENCOUNTER
Xanax      Last Written Prescription Date:  12/21/17  Last Fill Quantity: 90,   # refills: 2  Last Office Visit: 1/8/18  Future Office visit:       Routing refill request to provider for review/approval because:  Drug not on the [facility] [place] or [facility] refill protocol or controlled substance    Refill request denied due to requesting too soon.

## 2018-03-07 DIAGNOSIS — F41.9 ANXIETY: ICD-10-CM

## 2018-03-08 RX ORDER — ALPRAZOLAM 0.25 MG
TABLET ORAL
Qty: 90 TABLET | Refills: 2 | Status: SHIPPED | OUTPATIENT
Start: 2018-03-08 | End: 2018-05-21

## 2018-03-12 ENCOUNTER — TRANSFERRED RECORDS (OUTPATIENT)
Dept: HEALTH INFORMATION MANAGEMENT | Facility: CLINIC | Age: 48
End: 2018-03-12

## 2018-03-13 NOTE — PATIENT INSTRUCTIONS
1. Chronic bilateral low back pain with bilateral sciatica  2. Lumbar disc herniation  - MR Lumbar Spine w/o & w Contrast; Future  - MR Lumbar Spine w/o & w Contrast  - After MRI we can determine next course of action - injection vs neurosurgery consultation (last injection was at [facility], Dr. Conklin)  - Ice  - Anti-inflammatory PRN  - Topical Aspercreme with lidocaine          [facility]  [phone]           Increase sertraline to 150 mg daily and titrate up to 200 mg qd if needed.   For anxiety and performance anxiety.

## 2018-05-21 ENCOUNTER — TRANSFERRED RECORDS (OUTPATIENT)
Dept: FAMILY MEDICINE | Facility: OTHER | Age: 48
End: 2018-05-21

## 2018-05-21 ENCOUNTER — TELEPHONE (OUTPATIENT)
Dept: FAMILY MEDICINE | Facility: OTHER | Age: 48
End: 2018-05-21

## 2018-05-21 DIAGNOSIS — F41.9 ANXIETY: ICD-10-CM

## 2018-05-21 RX ORDER — ALPRAZOLAM 0.25 MG
TABLET ORAL
Qty: 90 TABLET | Refills: 2 | Status: SHIPPED | OUTPATIENT
Start: 2018-05-21 | End: 2018-08-08

## 2018-05-21 ASSESSMENT — ASTHMA QUESTIONNAIRES
QUESTION_4 LAST FOUR WEEKS HOW OFTEN HAVE YOU USED YOUR RESCUE INHALER OR NEBULIZER MEDICATION (SUCH AS ALBUTEROL): TWO OR THREE TIMES PER WEEK
QUESTION_2 LAST FOUR WEEKS HOW OFTEN HAVE YOU HAD SHORTNESS OF BREATH: ONCE OR TWICE A WEEK
ACT_TOTALSCORE: 21
QUESTION_1 LAST FOUR WEEKS HOW MUCH OF THE TIME DID YOUR ASTHMA KEEP YOU FROM GETTING AS MUCH DONE AT WORK, SCHOOL OR AT HOME: NONE OF THE TIME
QUESTION_5 LAST FOUR WEEKS HOW WOULD YOU RATE YOUR ASTHMA CONTROL: WELL CONTROLLED
QUESTION_3 LAST FOUR WEEKS HOW OFTEN DID YOUR ASTHMA SYMPTOMS (WHEEZING, COUGHING, SHORTNESS OF BREATH, CHEST TIGHTNESS OR PAIN) WAKE YOU UP AT NIGHT OR EARLIER THAN USUAL IN THE MORNING: NOT AT ALL

## 2018-05-21 NOTE — TELEPHONE ENCOUNTER
Patient returns ACT flowsheet in and additional information on asthma symptoms.  ACT entered and AAP printed out for patient and mailed with follow up noted

## 2018-05-21 NOTE — TELEPHONE ENCOUNTER
Xanax     Last Written Prescription Date:  3/8/18  Last Fill Quantity: 90,   # refills: 2  Last Office Visit: 1/8/18  Future Office visit:   none    Routing refill request to provider for review/approval because:  Drug not on the [facility], P or [facility] refill protocol or controlled substance

## 2018-05-21 NOTE — LETTER
My Asthma Action Plan  Name: Selma Dubose   YOB: 1970  Date: 5/21/2018   My doctor: Paz Maria MD   My clinic: [facility]        My Control Medicine: None  My Rescue Medicine: Albuterol (Proair/Ventolin/Proventil) inhaler as needed   My Asthma Severity: intermittent  Avoid your asthma triggers: Outdoor mold Indoor mold, Strong odors and sprays               GREEN ZONE   Good Control    I feel good    No cough or wheeze    Can work, sleep and play without asthma symptoms       Take your asthma control medicine every day.     1. If exercise triggers your asthma, take your rescue medication    15 minutes before exercise or sports, and    During exercise if you have asthma symptoms  2. Spacer to use with inhaler: If you have a spacer, make sure to use it with your inhaler             YELLOW ZONE Getting Worse  I have ANY of these:    I do not feel good    Cough or wheeze    Chest feels tight    Wake up at night   1. Keep taking your Green Zone medications  2. Start taking your rescue medicine:    every 20 minutes for up to 1 hour. Then every 4 hours for 24-48 hours.  3. If you stay in the Yellow Zone for more than 12-24 hours, contact your doctor.  4. If you do not return to the Green Zone in 12-24 hours or you get worse, start taking your oral steroid medicine if prescribed by your provider.           RED ZONE Medical Alert - Get Help  I have ANY of these:    I feel awful    Medicine is not helping    Breathing getting harder    Trouble walking or talking    Nose opens wide to breathe       1. Take your rescue medicine NOW  2. If your provider has prescribed an oral steroid medicine, start taking it NOW  3. Call your doctor NOW  4. If you are still in the Red Zone after 20 minutes and you have not reached your doctor:    Take your rescue medicine again and    Call 911 or go to the emergency room right away    See your regular doctor within 2 weeks of an Emergency Room or Urgent  Care visit for follow-up treatment.          Annual Reminders:  Meet with Asthma Educator,  Flu Shot in the Fall, consider Pneumonia Vaccination for patients with asthma (aged 19 and older).    Pharmacy:    [facility] [address] [place] [address] [address] [facility] [address]  [facility] - [address]  [facility] - [address]                      Asthma Triggers  How To Control Things That Make Your Asthma Worse    Triggers are things that make your asthma worse.  Look at the list below to help you find your triggers and what you can do about them.  You can help prevent asthma flare-ups by staying away from your triggers.      Trigger                                                          What you can do   Cigarette Smoke  Tobacco smoke can make asthma worse. Do not allow smoking in your home, car or around you.  Be sure no one smokes at a child s day care or school.  If you smoke, ask your health care provider for ways to help you quit.  Ask family members to quit too.  Ask your health care provider for a referral to Quit Plan to help you quit smoking, or call 0-734-826-PLAN.     Colds, Flu, Bronchitis  These are common triggers of asthma. Wash your hands often.  Don t touch your eyes, nose or mouth.  Get a flu shot every year.     Dust Mites  These are tiny bugs that live in cloth or carpet. They are too small to see. Wash sheets and blankets in hot water every week.   Encase pillows and mattress in dust mite proof covers.  Avoid having carpet if you can. If you have carpet, vacuum weekly.   Use a dust mask and HEPA vacuum.   Pollen and Outdoor Mold  Some people are allergic to trees, grass, or weed pollen, or molds. Try to keep your windows closed.  Limit time out doors when pollen count is high.   Ask you health care provider about taking medicine during allergy season.     Animal Dander  Some people  are allergic to skin flakes, urine or saliva from pets with fur or feathers. Keep pets with fur or feathers out of your home.    If you can t keep the pet outdoors, then keep the pet out of your bedroom.  Keep the bedroom door closed.  Keep pets off cloth furniture and away from stuffed toys.     Mice, Rats, and Cockroaches  Some people are allergic to the waste from these pests.   Cover food and garbage.  Clean up spills and food crumbs.  Store grease in the refrigerator.   Keep food out of the bedroom.   Indoor Mold  This can be a trigger if your home has high moisture. Fix leaking faucets, pipes, or other sources of water.   Clean moldy surfaces.  Dehumidify basement if it is damp and smelly.   Smoke, Strong Odors, and Sprays  These can reduce air quality. Stay away from strong odors and sprays, such as perfume, powder, hair spray, paints, smoke incense, paint, cleaning products, candles and new carpet.   Exercise or Sports  Some people with asthma have this trigger. Be active!  Ask your doctor about taking medicine before sports or exercise to prevent symptoms.    Warm up for 5-10 minutes before and after sports or exercise.     Other Triggers of Asthma  Cold air:  Cover your nose and mouth with a scarf.  Sometimes laughing or crying can be a trigger.  Some medicines and food can trigger asthma.

## 2018-05-22 ASSESSMENT — ASTHMA QUESTIONNAIRES: ACT_TOTALSCORE: 21

## 2018-07-27 ENCOUNTER — MYC MEDICAL ADVICE (OUTPATIENT)
Dept: FAMILY MEDICINE | Facility: OTHER | Age: 48
End: 2018-07-27

## 2018-07-30 NOTE — PROGRESS NOTES
SUBJECTIVE:   Selma Dubose is a 47 year old female who presents to clinic today for the following health issues:        Back Pain   Surgery 8/22/17 - L4, L5, S1 - fusion - [facility] - Dr. Elio Cramer.  He requests Re-Xray of Lumbar Spine due to ongoing pain.  Visit there 8/27/18.     Description:   Location of pain:  center  Character of pain: burning and pressure  Pain radiation: radiates into the right buttocks and radiates into the left buttocks  Since last visit, pain is:  worsened  New numbness or weakness in legs, not attributed to pain:  no     Intensity: Currently 5/10, At its worst 8/10    History:   Pain interferes with job: YES  Therapies tried without relief: surgery  Therapies tried with relief: none         Knee Pain    Duration: 1 year    Description  Location: right knee    Intensity:  moderate    Accompanying signs and symptoms: none    History  Previous similar problem: YES  Previous evaluation:  orthopedic evaluation    Precipitating or alleviating factors:  Trauma or overuse: no   Aggravating factors include: sitting    Therapies tried and outcome: exercises - knee sleeve.  [facility] ortho evaluation - Margarette, would like to see him again             Amount of exercise or physical activity: 2-3 days/week for an average of 15-30 minutes    Problems taking medications regularly: No    Medication side effects: none    Diet: regular (no restrictions)        Problem list and histories reviewed & adjusted, as indicated.  Additional history: as documented    Patient Active Problem List   Diagnosis     Acquired hypothyroidism     Anxiety     History of tachycardia     Obstructive sleep apnea     Tobacco dependence     Spina bifida with hydrocephalus (H)     history of brain aneurism     Tension-type headache, not intractable     history of Noncompliance with medication regimen     Acute midline low back pain with sciatica     Osteoarthritis of spine with radiculopathy, lumbar region      Lumbosacral radiculopathy at S1     Spinal stenosis of lumbar region     Moderate persistent asthma without complication     Morbid obesity (H)     Past Surgical History:   Procedure Laterality Date     BACK SURGERY  08/29/2017    [facility]- L4-L5 laminectomy and decompression L5-S1 transforaminal lumbar fusion, L5-S! diskectomy, L4-S! 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,nelida type 1 syndrome,       Social History   Substance Use Topics     Smoking status: Current Every Day Smoker     Packs/day: 1.00     Years: 24.00     Types: Cigarettes     Smokeless tobacco: Never Used      Comment: Tried to Quit (NO); Passive Exposure (NO)     Alcohol use Yes      Comment: 3 Beers - WEEKLY     Family History   Problem Relation Age of Onset     Other - See Comments Father 54     Accidental Head Injury     Alzheimer Disease Other      Diabetes Other      Cancer Other      LUNG     Respiratory Other      Emphysema     Cancer Other      LUNG         Current Outpatient Prescriptions   Medication Sig Dispense Refill     ALBUTEROL 108 (90 BASE) MCG/ACT inhaler INHALE 2 PUFFS INTO THE LUNGS FOUR TIMES DAILY 17 g 2     ALPRAZolam (XANAX) 0.25 MG tablet TAKE 1 TABLET BY MOUTH THREE TIMES DAILY AS NEEDED FOR ANXIETY 90 tablet 2     clindamycin (CLINDAMAX) 1 % lotion Apply topically 2 times daily 60 mL 11     ibuprofen (ADVIL/MOTRIN) 600 MG tablet TAKE 1 TABLET EVERY 8 HOURS AS NEEDED FOR MODERATE PAIN 90 tablet 5     levothyroxine (SYNTHROID/LEVOTHROID) 112 MCG tablet Take 1 tablet (112 mcg) by mouth daily 90 tablet 1     MATZIM  MG TB24 TAKE 1 TABLET DAILY 90 tablet 2     nicotine polacrilex (NICORETTE) 4 MG gum Take 4 mg by mouth       order for DME Knee high compression stockings, 15-20 mmHg compression  Sock wells (recent low back surgery) 1 Device 0     oxyCODONE-acetaminophen  (PERCOCET) 5-325 MG per tablet Take 1-2 tablets by mouth every 8 hours as needed for moderate to severe pain 20 tablet 0     No Known Allergies  Recent Labs   Lab Test  10/09/17   1222  08/14/17   1429   12/27/13   1451  05/07/13   0931   LDL   --    --    --   106  98   HDL   --    --    --   33*  23*   TRIG   --    --    --   45  278*   ALT   --   62*   --    --    --    CR  0.67  0.69   < >  0.73  0.87   GFRESTIMATED  >90  >90  Non African American GFR Calc     < >  87  71   GFRESTBLACK  >90  >90  African American GFR Calc     < >  >90  86   POTASSIUM  4.3  4.0   < >  3.7  4.3   TSH  3.92  5.73*   < >  4.69  7.79*    < > = values in this interval not displayed.      BP Readings from Last 3 Encounters:   07/31/18 124/74   01/05/18 120/80   10/09/17 110/78    Wt Readings from Last 3 Encounters:   07/31/18 262 lb (118.8 kg)   01/08/18 259 lb (117.5 kg)   01/05/18 259 lb (117.5 kg)                  Labs reviewed in EPIC    Reviewed and updated as needed this visit by clinical staff  Tobacco  Allergies  Meds       Reviewed and updated as needed this visit by Provider         ROS:  Constitutional, HEENT, cardiovascular, pulmonary, gi and gu systems are negative, except as otherwise noted.    OBJECTIVE:     /74 (BP Location: Right arm, Patient Position: Sitting, Cuff Size: Adult Large)  Pulse 80  Temp 96.8  F (36  C) (Tympanic)  Resp 14  Wt 262 lb (118.8 kg)  SpO2 97%  BMI 43.6 kg/m2  Body mass index is 43.6 kg/(m^2).     GENERAL: healthy, alert and no distress  NECK: no adenopathy, no asymmetry, masses, or scars and thyroid normal to palpation  RESP: lungs clear to auscultation - no rales, rhonchi or wheezes  CV: regular rate and rhythm, normal S1 S2, no S3 or S4, no murmur, click or rub, no peripheral edema and peripheral pulses strong  ABDOMEN: soft, nontender, no hepatosplenomegaly, no masses and bowel sounds normal  MS: Right knee pain - popliteal, medial and lateral.  Moderate swelling.  LS pain,  pulling sensation, bilateral SI joint pain, radiculopathy bilaterally      Procedure: XR LUMBAR SPINE 2-3 VIEWS    HISTORY:  Lumbar surgery 8/22/17, Neurosurgeon would like a re-Xray  due to pain; Chronic bilateral low back pain with bilateral sciatica;  Chronic bilateral low back pain with bilateral sciatica; Chronic  bilateral low back pain with bilateral sciatica    TECHNIQUE: Lumbar spine 3 views.    COMPARISON: MRI lumbar spine 2/21/2017    FINDINGS:    There are postoperative changes from L4 through S1 posterior lumbar  interbody fusion. Disc spacer devices are present at L4-5 and L5-S1.  Posterior fusion rods and transpedicular screws are intact. There are  no definite findings of hardware failure or loosening. Alignment of  the lumbar spine is maintained. Disc spaces above the fusion are  maintained with the exception of mild narrowing at T12-L1 with  associated degenerative endplate changes. There is mild facet  arthrosis at several levels.             Impression             IMPRESSION: L4-S1 spinal fusion.  Mild degenerative disease above the  fusion.    NKECHI MALCOLM MD            ASSESSMENT/PLAN:     1. Chronic bilateral low back pain with bilateral sciatica  - XR LUMBAR SPINE 2/3 VIEWS (Clinic Performed); Future  - FU with Neurosurgery as scheduled  - Continue plan of care    2. Chronic pain of right knee  - Continue with knee sleeve  - ORTHOPEDICS ADULT REFERRAL        Nga Yousif NP  [facility]

## 2018-07-31 ENCOUNTER — OFFICE VISIT (OUTPATIENT)
Dept: FAMILY MEDICINE | Facility: OTHER | Age: 48
End: 2018-07-31
Attending: NURSE PRACTITIONER
Payer: COMMERCIAL

## 2018-07-31 ENCOUNTER — RADIANT APPOINTMENT (OUTPATIENT)
Dept: GENERAL RADIOLOGY | Facility: OTHER | Age: 48
End: 2018-07-31
Attending: NURSE PRACTITIONER
Payer: COMMERCIAL

## 2018-07-31 VITALS
WEIGHT: 262 LBS | OXYGEN SATURATION: 97 % | DIASTOLIC BLOOD PRESSURE: 74 MMHG | HEART RATE: 80 BPM | BODY MASS INDEX: 43.6 KG/M2 | TEMPERATURE: 96.8 F | RESPIRATION RATE: 14 BRPM | SYSTOLIC BLOOD PRESSURE: 124 MMHG

## 2018-07-31 DIAGNOSIS — G89.29 CHRONIC BILATERAL LOW BACK PAIN WITH BILATERAL SCIATICA: ICD-10-CM

## 2018-07-31 DIAGNOSIS — G89.29 CHRONIC PAIN OF RIGHT KNEE: Primary | ICD-10-CM

## 2018-07-31 DIAGNOSIS — M54.42 CHRONIC BILATERAL LOW BACK PAIN WITH BILATERAL SCIATICA: ICD-10-CM

## 2018-07-31 DIAGNOSIS — M54.41 CHRONIC BILATERAL LOW BACK PAIN WITH BILATERAL SCIATICA: ICD-10-CM

## 2018-07-31 DIAGNOSIS — M25.561 CHRONIC PAIN OF RIGHT KNEE: Primary | ICD-10-CM

## 2018-07-31 PROBLEM — M54.40 CHRONIC BILATERAL LOW BACK PAIN WITH SCIATICA: Status: ACTIVE | Noted: 2018-07-31

## 2018-07-31 PROCEDURE — 72100 X-RAY EXAM L-S SPINE 2/3 VWS: CPT | Mod: TC

## 2018-07-31 PROCEDURE — 99214 OFFICE O/P EST MOD 30 MIN: CPT | Performed by: NURSE PRACTITIONER

## 2018-07-31 ASSESSMENT — ANXIETY QUESTIONNAIRES
2. NOT BEING ABLE TO STOP OR CONTROL WORRYING: SEVERAL DAYS
3. WORRYING TOO MUCH ABOUT DIFFERENT THINGS: SEVERAL DAYS
IF YOU CHECKED OFF ANY PROBLEMS ON THIS QUESTIONNAIRE, HOW DIFFICULT HAVE THESE PROBLEMS MADE IT FOR YOU TO DO YOUR WORK, TAKE CARE OF THINGS AT HOME, OR GET ALONG WITH OTHER PEOPLE: NOT DIFFICULT AT ALL
7. FEELING AFRAID AS IF SOMETHING AWFUL MIGHT HAPPEN: NOT AT ALL
GAD7 TOTAL SCORE: 4
5. BEING SO RESTLESS THAT IT IS HARD TO SIT STILL: NOT AT ALL
1. FEELING NERVOUS, ANXIOUS, OR ON EDGE: SEVERAL DAYS
6. BECOMING EASILY ANNOYED OR IRRITABLE: SEVERAL DAYS

## 2018-07-31 ASSESSMENT — PATIENT HEALTH QUESTIONNAIRE - PHQ9: 5. POOR APPETITE OR OVEREATING: NOT AT ALL

## 2018-07-31 ASSESSMENT — PAIN SCALES - GENERAL: PAINLEVEL: MODERATE PAIN (5)

## 2018-07-31 NOTE — NURSING NOTE
"Chief Complaint   Patient presents with     Back Pain     Musculoskeletal Problem       Initial /74 (BP Location: Right arm, Patient Position: Sitting, Cuff Size: Adult Large)  Pulse 80  Temp 96.8  F (36  C) (Tympanic)  Resp 14  Wt 262 lb (118.8 kg)  SpO2 97%  BMI 43.6 kg/m2 Estimated body mass index is 43.6 kg/(m^2) as calculated from the following:    Height as of 1/8/18: 5' 5\" (1.651 m).    Weight as of this encounter: 262 lb (118.8 kg).  Medication Reconciliation: complete    Luz Eldridge LPN    "

## 2018-07-31 NOTE — PATIENT INSTRUCTIONS
ASSESSMENT/PLAN:     1. Chronic bilateral low back pain with bilateral sciatica  - XR LUMBAR SPINE 2/3 VIEWS (Clinic Performed); Future  - FU with Neurosurgery as scheduled  - Continue plan of care    2. Chronic pain of right knee  - Continue with knee sleeve  - ORTHOPEDICS ADULT REFERRAL        Nga Yousif NP  [facility]

## 2018-07-31 NOTE — MR AVS SNAPSHOT
After Visit Summary   7/31/2018    Selma Dubose    MRN: [id number]           Patient Information     Date Of Birth          1970        Visit Information        Provider Department      7/31/2018 3:45 PM Nga Yousif NP [facility]        Today's Diagnoses     Chronic pain of right knee    -  1    Chronic bilateral low back pain with bilateral sciatica          Care Instructions        ASSESSMENT/PLAN:     1. Chronic bilateral low back pain with bilateral sciatica  - XR LUMBAR SPINE 2/3 VIEWS (Clinic Performed); Future  - FU with Neurosurgery as scheduled  - Continue plan of care    2. Chronic pain of right knee  - Continue with knee sleeve  - ORTHOPEDICS ADULT REFERRAL        Nga Yousif NP  [facility]            Follow-ups after your visit        Additional Services     ORTHOPEDICS ADULT REFERRAL       Your provider has referred you to: Dr Margarette Wilde or Maria Antonia, whichever is sonner.  Has seen him previously for same.     Please be aware that coverage of these services is subject to the terms and limitations of your health insurance plan.  Call member services at your health plan with any benefit or coverage questions.      Please bring the following to your appointment:    >>   Any x-rays, CTs or MRIs which have been performed.  Contact the facility where they were done to arrange for  prior to your scheduled appointment.    >>   List of current medications   >>   This referral request   >>   Any documents/labs given to you for this referral                  Who to contact     If you have questions or need follow up information about today's clinic visit or your schedule please contact [facility] directly at [phone].  Normal or non-critical lab and imaging results will be communicated to you by MyChart, letter or phone within 4 business days after the clinic has received the results. If you do not hear from us within 7 days,  please contact the clinic through [employer] or phone. If you have a critical or abnormal lab result, we will notify you by phone as soon as possible.  Submit refill requests through [employer] or call your pharmacy and they will forward the refill request to us. Please allow 3 business days for your refill to be completed.          Additional Information About Your Visit        [employer] Information     [employer] gives you secure access to your electronic health record. If you see a primary care provider, you can also send messages to your care team and make appointments. If you have questions, please call your primary care clinic.  If you do not have a primary care provider, please call [phone] and they will assist you.        Care EveryWhere ID     This is your Care EveryWhere ID. This could be used by other organizations to access your [facility] medical records  [id number]        Your Vitals Were     Pulse Temperature Respirations Pulse Oximetry BMI (Body Mass Index)       80 96.8  F (36  C) (Tympanic) 14 97% 43.6 kg/m2        Blood Pressure from Last 3 Encounters:   07/31/18 124/74   01/05/18 120/80   10/09/17 110/78    Weight from Last 3 Encounters:   07/31/18 262 lb (118.8 kg)   01/08/18 259 lb (117.5 kg)   01/05/18 259 lb (117.5 kg)              We Performed the Following     ORTHOPEDICS ADULT REFERRAL        Primary Care Provider Office Phone # Fax #    Paz [address]EE [facility] [phone] [address] [facility] [address]        Equal Access to Services     HEMANT TY : Hadii aad ku hadasho Sojustinali, waaxda luqadaha, qaybta kaalmada vesnayaoctavio, waxay radha la . So [facility] [phone].    ATENCIÓN: Si habla español, tiene a chaves disposición servicios gratuitos de asistencia lingüística. Llame al [phone].    We comply with applicable federal civil rights laws and [place] laws. We do not discriminate on the basis of race, color, national origin, age,  disability, sex, sexual orientation, or gender identity.            Thank you!     Thank you for choosing [facility]  for your care. Our goal is always to provide you with excellent care. Hearing back from our patients is one way we can continue to improve our services. Please take a few minutes to complete the written survey that you may receive in the mail after your visit with us. Thank you!             Your Updated Medication List - Protect others around you: Learn how to safely use, store and throw away your medicines at www.disposemymeds.org.          This list is accurate as of 7/31/18  4:21 PM.  Always use your most recent med list.                   Brand Name Dispense Instructions for use Diagnosis    ALPRAZolam 0.25 MG tablet    XANAX    90 tablet    TAKE 1 TABLET BY MOUTH THREE TIMES DAILY AS NEEDED FOR ANXIETY    Anxiety       clindamycin 1 % lotion    CLINDAMAX    60 mL    Apply topically 2 times daily    Boil, face       ibuprofen 600 MG tablet    ADVIL/MOTRIN    90 tablet    TAKE 1 TABLET EVERY 8 HOURS AS NEEDED FOR MODERATE PAIN    Osteoarthritis of multiple joints, unspecified osteoarthritis type       levothyroxine 112 MCG tablet    SYNTHROID/LEVOTHROID    90 tablet    Take 1 tablet (112 mcg) by mouth daily    Hypothyroidism, unspecified type       MATZIM  MG Tb24   Generic drug:  diltiazem HCl COATED BEADS     90 tablet    TAKE 1 TABLET DAILY    Tachycardia       nicotine polacrilex 4 MG gum    NICORETTE     Take 4 mg by mouth        order for DME     1 Device    Knee high compression stockings, 15-20 mmHg compression Sock wells (recent low back surgery)    Lower extremity edema       oxyCODONE-acetaminophen 5-325 MG per tablet    PERCOCET    20 tablet    Take 1-2 tablets by mouth every 8 hours as needed for moderate to severe pain    S/P lumbar spine operation       PROAIR  (90 Base) MCG/ACT Inhaler   Generic drug:  albuterol     17 g    INHALE 2 PUFFS INTO THE LUNGS  FOUR TIMES DAILY    SOB (shortness of breath)

## 2018-08-01 ENCOUNTER — TELEPHONE (OUTPATIENT)
Dept: FAMILY MEDICINE | Facility: OTHER | Age: 48
End: 2018-08-01

## 2018-08-01 ASSESSMENT — ANXIETY QUESTIONNAIRES: GAD7 TOTAL SCORE: 4

## 2018-08-08 DIAGNOSIS — F41.9 ANXIETY: ICD-10-CM

## 2018-08-09 ENCOUNTER — MYC MEDICAL ADVICE (OUTPATIENT)
Dept: FAMILY MEDICINE | Facility: OTHER | Age: 48
End: 2018-08-09

## 2018-08-09 DIAGNOSIS — F41.9 ANXIETY: ICD-10-CM

## 2018-08-09 RX ORDER — ALPRAZOLAM 0.25 MG
TABLET ORAL
Qty: 90 TABLET | Refills: 2 | Status: SHIPPED | OUTPATIENT
Start: 2018-08-09 | End: 2018-10-24

## 2018-08-09 NOTE — TELEPHONE ENCOUNTER
Xanax       Last Written Prescription Date:  5/21/2018  Last Fill Quantity: 90,   # refills: 2  Last Office Visit: 7/13/2018  Future Office visit:    Next 5 appointments (look out 90 days)     Aug 21, 2018  8:30 AM CDT   (Arrive by 8:15 AM)   SHORT with Paz Maria MD   [facility] ([facility] )    [address]  [facility] [address]   [phone]

## 2018-08-10 RX ORDER — ALPRAZOLAM 0.25 MG
TABLET ORAL
Qty: 90 TABLET | Refills: 0 | OUTPATIENT
Start: 2018-08-10

## 2018-08-10 NOTE — TELEPHONE ENCOUNTER
Xanax  Last Written Prescription Date:  8/9/1/  Last Fill Qty:  90, # Refills:  2  Last Office Visit:  7/31/18    Refill denied as it was just filled yesterday.

## 2018-08-17 NOTE — PROGRESS NOTES
SUBJECTIVE:                                                    Selma Dubose is a 47 year old female who presents to clinic today for the following health issues:      Asthma Follow-Up    Was ACT completed today?    Yes    ACT Total Scores 8/21/2018   ACT TOTAL SCORE (Goal Greater than or Equal to 20) 23   In the past 12 months, how many times did you visit the emergency room for your asthma without being admitted to the hospital? 0   In the past 12 months, how many times were you hospitalized overnight because of your asthma? 0       Recent asthma triggers that patient is dealing with: smoke and strong odors and fumes      Hypothyroidism Follow-up      Since last visit, patient describes the following symptoms: Weight stable, no hair loss, no skin changes, no constipation, no loose stools      Amount of exercise or physical activity: 6-7 days/week for an average of 15-30 minutes    Problems taking medications regularly: No    Medication side effects: jacquelyn thinks from thyroid medication     Diet: carbohydrate counting        Back Pain Follow Up      Description:   Location of pain:  center  Character of pain: pressure and uncomfortable   Pain radiation: radiates into the left buttocks  Since last visit, pain is:  unchanged  New numbness or weakness in legs, not attributed to pain:  no     Intensity: Currently 4/10, At its worst 7/10    History:   Pain interferes with job: YES  Therapies tried without relief: Physical Therapy  Therapies tried with relief: cold and NSAIDs     Accompanying Signs & Symptoms:  Risk of Fracture:  None  Risk of Cauda Equina:  None  Risk of Infection:  None  Risk of Cancer:  None    Patient does have follow-up with her Neurosurgeon this week.      Problem list and histories reviewed & adjusted, as indicated.  Additional history: as documented    Patient Active Problem List   Diagnosis     Acquired hypothyroidism     Anxiety     History of tachycardia     Obstructive sleep apnea      Tobacco dependence     Spina bifida with hydrocephalus (H)     history of brain aneurism     Tension-type headache, not intractable     history of Noncompliance with medication regimen     Osteoarthritis of spine with radiculopathy, lumbar region     Lumbosacral radiculopathy at S1     Spinal stenosis of lumbar region     Moderate persistent asthma without complication     Morbid obesity (H)     Chronic bilateral low back pain with sciatica     Past Surgical History:   Procedure Laterality Date     BACK SURGERY  08/29/2017    [facility]- L4-L5 laminectomy and decompression L5-S1 transforaminal lumbar fusion, L5-S! diskectomy, L4-S! 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,nelida type 1 syndrome,       Social History   Substance Use Topics     Smoking status: Current Every Day Smoker     Packs/day: 1.00     Years: 24.00     Types: Cigarettes     Smokeless tobacco: Never Used      Comment: Tried to Quit (NO); Passive Exposure (NO)     Alcohol use Yes      Comment: 3 Beers - WEEKLY     Family History   Problem Relation Age of Onset     Other - See Comments Father 54     Accidental Head Injury     Alzheimer Disease Other      Diabetes Other      Cancer Other      LUNG     Respiratory Other      Emphysema     Cancer Other      LUNG         Current Outpatient Prescriptions   Medication Sig Dispense Refill     ALBUTEROL 108 (90 BASE) MCG/ACT inhaler INHALE 2 PUFFS INTO THE LUNGS FOUR TIMES DAILY 17 g 2     ALPRAZolam (XANAX) 0.25 MG tablet TAKE 1 TABLET BY MOUTH THREE TIMES DAILY AS NEEDED FOR ANXIETY 90 tablet 2     clindamycin (CLINDAMAX) 1 % lotion Apply topically 2 times daily 60 mL 11     ibuprofen (ADVIL/MOTRIN) 600 MG tablet TAKE 1 TABLET EVERY 8 HOURS AS NEEDED FOR MODERATE PAIN 90 tablet 5     levothyroxine (SYNTHROID/LEVOTHROID) 112 MCG tablet Take 1 tablet (112 mcg) by  "mouth daily 90 tablet 1     MATZIM  MG TB24 TAKE 1 TABLET DAILY 90 tablet 2     nicotine polacrilex (NICORETTE) 4 MG gum Take 4 mg by mouth       order for DME Knee high compression stockings, 15-20 mmHg compression  Sock wells (recent low back surgery) 1 Device 0     No Known Allergies    ROS:  Constitutional, HEENT, cardiovascular, pulmonary, gi and gu systems are negative, except as otherwise noted.    OBJECTIVE:     /72 (BP Location: Left arm, Patient Position: Chair, Cuff Size: Adult Large)  Pulse 82  Temp 97.6  F (36.4  C) (Tympanic)  Resp 16  Ht 5' 5\" (1.651 m)  Wt 258 lb 12.8 oz (117.4 kg)  LMP 08/10/2018 (Approximate)  SpO2 94%  BMI 43.07 kg/m2  Body mass index is 43.07 kg/(m^2).  GENERAL: healthy, alert and no distress  PSYCH: mentation appears normal, affect normal/bright    Diagnostic Test Results:  none     ASSESSMENT/PLAN:     Asthma: Moderate persistent--controlled   Plan:  No changes in the patient's current treatment plan    Hypothyroidism; controlled/euthyroid   Plan:  No changes in the patient's current treatment plan  Labs:  TSH      Tobacco Cessation:   reports that she has been smoking Cigarettes.  She has a 24.00 pack-year smoking history. She has never used smokeless tobacco.  Tobacco Cessation Action Plan: Information offered: Patient not interested at this time        ICD-10-CM    1. Moderate persistent asthma without complication J45.40    2. Acquired hypothyroidism E03.9 TSH     Lipid Profile   3. Chronic bilateral low back pain with bilateral sciatica M54.42     M54.41     G89.29    4. Tobacco abuse Z72.0 Tobacco Cessation - Order to Satisfy Health Maintenance   5. Tobacco abuse counseling Z71.6        FUTURE APPOINTMENTS:       - Follow-up visit in 6 months, sooner as needed.      Paz Maria MD  [facility]      "

## 2018-08-21 ENCOUNTER — OFFICE VISIT (OUTPATIENT)
Dept: FAMILY MEDICINE | Facility: OTHER | Age: 48
End: 2018-08-21
Attending: FAMILY MEDICINE
Payer: COMMERCIAL

## 2018-08-21 VITALS
TEMPERATURE: 97.6 F | RESPIRATION RATE: 16 BRPM | OXYGEN SATURATION: 94 % | HEART RATE: 82 BPM | DIASTOLIC BLOOD PRESSURE: 72 MMHG | SYSTOLIC BLOOD PRESSURE: 118 MMHG | HEIGHT: 65 IN | WEIGHT: 258.8 LBS | BODY MASS INDEX: 43.12 KG/M2

## 2018-08-21 DIAGNOSIS — M54.42 CHRONIC BILATERAL LOW BACK PAIN WITH BILATERAL SCIATICA: ICD-10-CM

## 2018-08-21 DIAGNOSIS — J45.40 MODERATE PERSISTENT ASTHMA WITHOUT COMPLICATION: Primary | ICD-10-CM

## 2018-08-21 DIAGNOSIS — Z71.6 TOBACCO ABUSE COUNSELING: ICD-10-CM

## 2018-08-21 DIAGNOSIS — E03.9 ACQUIRED HYPOTHYROIDISM: ICD-10-CM

## 2018-08-21 DIAGNOSIS — G89.29 CHRONIC BILATERAL LOW BACK PAIN WITH BILATERAL SCIATICA: ICD-10-CM

## 2018-08-21 DIAGNOSIS — M54.41 CHRONIC BILATERAL LOW BACK PAIN WITH BILATERAL SCIATICA: ICD-10-CM

## 2018-08-21 DIAGNOSIS — Z72.0 TOBACCO ABUSE: ICD-10-CM

## 2018-08-21 PROBLEM — M54.40 ACUTE MIDLINE LOW BACK PAIN WITH SCIATICA: Status: RESOLVED | Noted: 2017-04-28 | Resolved: 2018-08-21

## 2018-08-21 LAB
CHOLEST SERPL-MCNC: 154 MG/DL
HDLC SERPL-MCNC: 18 MG/DL
LDLC SERPL CALC-MCNC: 77 MG/DL
NONHDLC SERPL-MCNC: 136 MG/DL
TRIGL SERPL-MCNC: 294 MG/DL
TSH SERPL DL<=0.005 MIU/L-ACNC: 4.18 MU/L (ref 0.4–4)

## 2018-08-21 PROCEDURE — 36415 COLL VENOUS BLD VENIPUNCTURE: CPT | Performed by: FAMILY MEDICINE

## 2018-08-21 PROCEDURE — 84443 ASSAY THYROID STIM HORMONE: CPT | Performed by: FAMILY MEDICINE

## 2018-08-21 PROCEDURE — 99214 OFFICE O/P EST MOD 30 MIN: CPT | Performed by: FAMILY MEDICINE

## 2018-08-21 PROCEDURE — 80061 LIPID PANEL: CPT | Performed by: FAMILY MEDICINE

## 2018-08-21 ASSESSMENT — ANXIETY QUESTIONNAIRES
7. FEELING AFRAID AS IF SOMETHING AWFUL MIGHT HAPPEN: NOT AT ALL
GAD7 TOTAL SCORE: 7
3. WORRYING TOO MUCH ABOUT DIFFERENT THINGS: SEVERAL DAYS
5. BEING SO RESTLESS THAT IT IS HARD TO SIT STILL: NOT AT ALL
4. TROUBLE RELAXING: MORE THAN HALF THE DAYS
1. FEELING NERVOUS, ANXIOUS, OR ON EDGE: SEVERAL DAYS
2. NOT BEING ABLE TO STOP OR CONTROL WORRYING: SEVERAL DAYS
6. BECOMING EASILY ANNOYED OR IRRITABLE: MORE THAN HALF THE DAYS
IF YOU CHECKED OFF ANY PROBLEMS ON THIS QUESTIONNAIRE, HOW DIFFICULT HAVE THESE PROBLEMS MADE IT FOR YOU TO DO YOUR WORK, TAKE CARE OF THINGS AT HOME, OR GET ALONG WITH OTHER PEOPLE: SOMEWHAT DIFFICULT

## 2018-08-21 ASSESSMENT — ASTHMA QUESTIONNAIRES
QUESTION_3 LAST FOUR WEEKS HOW OFTEN DID YOUR ASTHMA SYMPTOMS (WHEEZING, COUGHING, SHORTNESS OF BREATH, CHEST TIGHTNESS OR PAIN) WAKE YOU UP AT NIGHT OR EARLIER THAN USUAL IN THE MORNING: NOT AT ALL
ACT_TOTALSCORE: 23
QUESTION_5 LAST FOUR WEEKS HOW WOULD YOU RATE YOUR ASTHMA CONTROL: COMPLETELY CONTROLLED
QUESTION_2 LAST FOUR WEEKS HOW OFTEN HAVE YOU HAD SHORTNESS OF BREATH: ONCE OR TWICE A WEEK
QUESTION_1 LAST FOUR WEEKS HOW MUCH OF THE TIME DID YOUR ASTHMA KEEP YOU FROM GETTING AS MUCH DONE AT WORK, SCHOOL OR AT HOME: NONE OF THE TIME
QUESTION_4 LAST FOUR WEEKS HOW OFTEN HAVE YOU USED YOUR RESCUE INHALER OR NEBULIZER MEDICATION (SUCH AS ALBUTEROL): ONCE A WEEK OR LESS

## 2018-08-21 ASSESSMENT — PAIN SCALES - GENERAL: PAINLEVEL: MODERATE PAIN (5)

## 2018-08-21 NOTE — NURSING NOTE
"Chief Complaint   Patient presents with     Asthma     Thyroid Problem     Nicotine Dependence     Back Pain       Initial /72 (BP Location: Left arm, Patient Position: Chair, Cuff Size: Adult Large)  Pulse 82  Temp 97.6  F (36.4  C) (Tympanic)  Resp 16  Ht 5' 5\" (1.651 m)  Wt 258 lb 12.8 oz (117.4 kg)  LMP 08/10/2018 (Approximate)  SpO2 94%  BMI 43.07 kg/m2 Estimated body mass index is 43.07 kg/(m^2) as calculated from the following:    Height as of this encounter: 5' 5\" (1.651 m).    Weight as of this encounter: 258 lb 12.8 oz (117.4 kg).  Medication Reconciliation: complete    Pamela M. Lechevalier, LPN    "

## 2018-08-21 NOTE — PATIENT INSTRUCTIONS

## 2018-08-21 NOTE — MR AVS SNAPSHOT
After Visit Summary   8/21/2018    Selma Dubose    MRN: [id number]           Patient Information     Date Of Birth          1970        Visit Information        Provider Department      8/21/2018 8:30 AM Paz Maria MD [facility]        Today's Diagnoses     Moderate persistent asthma without complication    -  1    Acquired hypothyroidism        Chronic bilateral low back pain with bilateral sciatica        Tobacco abuse        Tobacco abuse counseling          Care Instructions      HOW TO QUIT SMOKING  Smoking is one of the hardest habits to break. About half of all those who have ever smoked have been able to quit, and most of those (about 70%) who still smoke want to quit. Here are some of the best ways to stop smoking.     KEEP TRYING:  It takes most smokers about 8 tries before they are finally able to fully quit. So, the more often you try and fail, the better your chance of quitting the next time! So, don't give up!    GO COLD TURKEY:  Most ex-smokers quit cold turkey. Trying to cut back gradually doesn't seem to work as well, perhaps because it continues the smoking habit. Also, it is possible to fool yourself by inhaling more while smoking fewer cigarettes. This results in the same amount of nicotine in your body!    GET SUPPORT:  Support programs can make an important difference, especially for the heavy smoker. These groups offer lectures, methods to change your behavior and peer support. Call the free national Quitline for more information. 800-QUIT-NOW (054-732-0221). Low-cost or free programs are offered by many hospitals, local chapters of the American Lung Association (641-717-4816) and the American Cancer Society (869-017-7839). Support at home is important too. Non-smokers can help by offering praise and encouragement. If the smoker fails to quit, encourage them to try again!    OVER-THE-COUNTER MEDICINES:  For those who can't quit on their own,  Nicotine Replacement Therapy (NRT) may make quitting much easier. Certain aids such as the nicotine patch, gum and lozenge are available without a prescription. However, it is best to use these under the guidance of your doctor. The skin patch provides a steady supply of nicotine to the body. Nicotine gum and lozenge gives temporary bursts of low levels of nicotine. Both methods take the edge off the craving for cigarettes. WARNING: If you feel symptoms of nicotine overdose, such as nausea, vomiting, dizziness, weakness, or fast heartbeat, stop using these and see your doctor.    PRESCRIPTION MEDICINES:  After evaluating your smoking patterns and prior attempts at quitting, your doctor may offer a prescription medicine such as bupropion (Zyban, Wellbutrin), varenicline (Chantix, Champix), a niocotine inhaler or nasal spray. Each has its unique advantage and side effects which your doctor can review with you.    HEALTH BENEFITS OF QUITTING:  The benefits of quitting start right away and keep improving the longer you go without smokin minutes: blood pressure and pulse return to normal  8 hours: oxygen levels return to normal  2 days: ability to smell and taste begins to improve as damaged nerves start to regrow  2-3 weeks: circulation and lung function improves  1-9 months: decreased cough, congestion and shortness of breath; less tired  1 year: risk of heart attack decreases by half  5 years: risk of lung cancer decreases by half; risk of stroke becomes the same as a non-smoker  For information about how to quit smoking, visit the following links:  National Cancer [place] ,   Clearing the Air, Quit Smoking Today   - an online booklet. http://www.smokefree.gov/pubs/clearing_the_air.pdf  Smokefree.gov http://smokefree.gov/  QuitNet http://www.quitnet.com/    0071-7979 [employer], [address] [place] [address], [place] [address]. All rights reserved. This information is not intended as a substitute for  professional medical care. Always follow your healthcare professional's instructions.    The Benefits of Living Smoke Free  What do you want to gain from quitting? Check off some reasons to quit.  Health Benefits  ___ Reduce my risk of lung cancer, heart disease, chronic lung disease  ___ Have fewer wrinkles and softer skin  ___ Improve my sense of taste and smell  ___ For pregnant women--reduce the risk of having a miscarriage, stillbirth, premature birth, or low-birth-weight baby  Personal Benefits  ___ Feel more in control of my life  ___ Have better-smelling hair, breath, clothes, home, and car  ___ Save time by not having to take smoke breaks, buy cigarettes, or hunt for a light  ___ Have whiter teeth  Family Benefits  ___ Reduce my children s respiratory tract infections  ___ Set a good example for my children  ___ Reduce my family s cancer risk  Financial Benefits  ___ Save hundreds of dollars each year that would be spent on cigarettes  ___ Save money on medical bills  ___ Save on life, health, and car insurance premiums    Those Dollars Add Up!  Cigarettes are expensive, and getting more expensive all the time. Do you realize how much money you are spending on cigarettes per year? What is the average amount you spend on a pack of cigarettes? What is the average number of packs that you smoke per day? Using your answers to these questions, fill in this formula to help you find out:  ($ _____ per pack) ×  ( _____ number of packs per day) × (365 days) =  $ _____ yearly cost of smoking  Besides tobacco, there are other costs, including extra cleaning bills and replacement costs for clothing and furniture; medical expenses for smoking-related illnesses; and higher health, life, and car insurance premiums.    Cigars and Pipes Count Too!  Cigars and pipes are also dangerous. So are smokeless (chewing) tobacco and snuff. All of these products contain nicotine, a highly addictive substance that has harmful effects  "on your body. Quitting smoking means giving up all tobacco products.      5422-9966 [employer], [address] [facility], [place] [address]. All rights reserved. This information is not intended as a substitute for professional medical care. Always follow your healthcare professional's instructions.          Follow-ups after your visit        Follow-up notes from your care team     Return in about 6 months (around 2/21/2019).      Who to contact     If you have questions or need follow up information about today's clinic visit or your schedule please contact [facility] directly at [phone].  Normal or non-critical lab and imaging results will be communicated to you by MyChart, letter or phone within 4 business days after the clinic has received the results. If you do not hear from us within 7 days, please contact the clinic through [employer] or phone. If you have a critical or abnormal lab result, we will notify you by phone as soon as possible.  Submit refill requests through [employer] or call your pharmacy and they will forward the refill request to us. Please allow 3 business days for your refill to be completed.          Additional Information About Your Visit        MyChart Information     [employer] gives you secure access to your electronic health record. If you see a primary care provider, you can also send messages to your care team and make appointments. If you have questions, please call your primary care clinic.  If you do not have a primary care provider, please call [phone] and they will assist you.        Care EveryWhere ID     This is your Care EveryWhere ID. This could be used by other organizations to access your [place] medical records  [id number]        Your Vitals Were     Pulse Temperature Respirations Height Last Period Pulse Oximetry    82 97.6  F (36.4  C) (Tympanic) 16 5' 5\" (1.651 m) 08/10/2018 (Approximate) 94%    BMI (Body Mass Index)                   43.07 kg/m2   "          Blood Pressure from Last 3 Encounters:   08/21/18 118/72   07/31/18 124/74   01/05/18 120/80    Weight from Last 3 Encounters:   08/21/18 258 lb 12.8 oz (117.4 kg)   07/31/18 262 lb (118.8 kg)   01/08/18 259 lb (117.5 kg)              We Performed the Following     Lipid Profile     Tobacco Cessation - Order to Satisfy Health Maintenance     TSH        Primary Care Provider Office Phone # Fax #    Pazsamantha Maria [phone] [address] [facility] [address]        Equal Access to Services     [facility]: Hadii annalee Hollins, waloc tapia, javed kaalmaoctavio edwards, prema la . So [facility] [phone].    ATENCIÓN: Si habla español, tiene a chaves disposición servicios gratuitos de asistencia lingüística. [facility] [phone].    We comply with applicable federal civil rights laws and [place] laws. We do not discriminate on the basis of race, color, national origin, age, disability, sex, sexual orientation, or gender identity.            Thank you!     Thank you for choosing [facility]  for your care. Our goal is always to provide you with excellent care. Hearing back from our patients is one way we can continue to improve our services. Please take a few minutes to complete the written survey that you may receive in the mail after your visit with us. Thank you!             Your Updated Medication List - Protect others around you: Learn how to safely use, store and throw away your medicines at www.disposemymeds.org.          This list is accurate as of 8/21/18  9:16 AM.  Always use your most recent med list.                   Brand Name Dispense Instructions for use Diagnosis    ALPRAZolam 0.25 MG tablet    XANAX    90 tablet    TAKE 1 TABLET BY MOUTH THREE TIMES DAILY AS NEEDED FOR ANXIETY    Anxiety       clindamycin 1 % lotion    CLINDAMAX    60 mL    Apply topically 2 times daily    Boil, face        ibuprofen 600 MG tablet    ADVIL/MOTRIN    90 tablet    TAKE 1 TABLET EVERY 8 HOURS AS NEEDED FOR MODERATE PAIN    Osteoarthritis of multiple joints, unspecified osteoarthritis type       levothyroxine 112 MCG tablet    SYNTHROID/LEVOTHROID    90 tablet    Take 1 tablet (112 mcg) by mouth daily    Hypothyroidism, unspecified type       MATZIM  MG Tb24   Generic drug:  diltiazem HCl COATED BEADS     90 tablet    TAKE 1 TABLET DAILY    Tachycardia       nicotine polacrilex 4 MG gum    NICORETTE     Take 4 mg by mouth        order for DME     1 Device    Knee high compression stockings, 15-20 mmHg compression Sock wells (recent low back surgery)    Lower extremity edema       PROAIR  (90 Base) MCG/ACT inhaler   Generic drug:  albuterol     17 g    INHALE 2 PUFFS INTO THE LUNGS FOUR TIMES DAILY    SOB (shortness of breath)

## 2018-08-22 ASSESSMENT — PATIENT HEALTH QUESTIONNAIRE - PHQ9: SUM OF ALL RESPONSES TO PHQ QUESTIONS 1-9: 8

## 2018-08-22 ASSESSMENT — ASTHMA QUESTIONNAIRES: ACT_TOTALSCORE: 23

## 2018-08-22 ASSESSMENT — ANXIETY QUESTIONNAIRES: GAD7 TOTAL SCORE: 7

## 2018-08-26 ENCOUNTER — HEALTH MAINTENANCE LETTER (OUTPATIENT)
Age: 48
End: 2018-08-26

## 2018-08-27 ENCOUNTER — TRANSFERRED RECORDS (OUTPATIENT)
Dept: HEALTH INFORMATION MANAGEMENT | Facility: CLINIC | Age: 48
End: 2018-08-27

## 2018-09-10 ENCOUNTER — MYC MEDICAL ADVICE (OUTPATIENT)
Dept: FAMILY MEDICINE | Facility: OTHER | Age: 48
End: 2018-09-10

## 2018-09-17 ENCOUNTER — MYC MEDICAL ADVICE (OUTPATIENT)
Dept: FAMILY MEDICINE | Facility: OTHER | Age: 48
End: 2018-09-17

## 2018-10-24 DIAGNOSIS — F41.9 ANXIETY: ICD-10-CM

## 2018-10-24 RX ORDER — ALPRAZOLAM 0.25 MG
TABLET ORAL
Qty: 90 TABLET | Refills: 0 | Status: SHIPPED | OUTPATIENT
Start: 2018-10-24 | End: 2018-11-19

## 2018-10-24 NOTE — TELEPHONE ENCOUNTER
Pt of .Zanax last filled on 8.9.18 #90 with 2 refills. Last office visit on 8.21.18.Pended.Thank you.

## 2018-10-24 NOTE — TELEPHONE ENCOUNTER
Should still have refills if she had 90 with 2.    ?    [facility]  [phone]

## 2018-10-30 DIAGNOSIS — R00.0 TACHYCARDIA: ICD-10-CM

## 2018-10-30 DIAGNOSIS — E03.9 HYPOTHYROIDISM, UNSPECIFIED TYPE: ICD-10-CM

## 2018-10-30 NOTE — LETTER
[facility]  [address]  [place] [address]  [phone]        January 3, 2019    Selma Dubose  [facility] [address]              Dear Selma Dubose    This is to remind you that your non-fasting lab is due.    You may call our office at [phone] to schedule an appointment.    Please disregard this notice if you have already had your labs drawn or made an appointment.        Sincerely,        Paz Maria DR

## 2018-10-31 RX ORDER — DILTIAZEM HYDROCHLORIDE 240 MG/1
TABLET, EXTENDED RELEASE ORAL
Qty: 90 TABLET | Refills: 0 | Status: SHIPPED | OUTPATIENT
Start: 2018-10-31 | End: 2019-02-03

## 2018-10-31 RX ORDER — LEVOTHYROXINE SODIUM 112 UG/1
TABLET ORAL
Qty: 90 TABLET | Refills: 0 | Status: SHIPPED | OUTPATIENT
Start: 2018-10-31 | End: 2019-02-03

## 2018-11-16 DIAGNOSIS — L02.02 BOIL, FACE: ICD-10-CM

## 2018-11-16 RX ORDER — CLINDAMYCIN PHOSPHATE 10 UG/ML
LOTION TOPICAL
Qty: 60 ML | Refills: 0 | Status: SHIPPED | OUTPATIENT
Start: 2018-11-16 | End: 2020-01-20

## 2018-11-16 NOTE — TELEPHONE ENCOUNTER
clindamycin (CLEOCIN T) 1 % lotion       Last Written Prescription Date:  10/30/17  Last Fill Quantity: 60ml,   # refills: 11  Last Office Visit: 8/21/18  Future Office visit:

## 2018-11-19 ENCOUNTER — MYC MEDICAL ADVICE (OUTPATIENT)
Dept: FAMILY MEDICINE | Facility: OTHER | Age: 48
End: 2018-11-19

## 2018-11-19 DIAGNOSIS — F41.9 ANXIETY: ICD-10-CM

## 2018-11-19 RX ORDER — ALPRAZOLAM 0.25 MG
TABLET ORAL
Qty: 90 TABLET | Refills: 5 | Status: SHIPPED | OUTPATIENT
Start: 2018-11-19 | End: 2019-04-22

## 2018-12-07 DIAGNOSIS — M15.9 OSTEOARTHRITIS OF MULTIPLE JOINTS, UNSPECIFIED OSTEOARTHRITIS TYPE: ICD-10-CM

## 2018-12-10 RX ORDER — IBUPROFEN 600 MG/1
TABLET ORAL
Qty: 90 TABLET | Refills: 1 | Status: SHIPPED | OUTPATIENT
Start: 2018-12-10 | End: 2019-03-11

## 2018-12-10 NOTE — TELEPHONE ENCOUNTER
MG tablet  Last Written Prescription Date:  1/16/18  Last Fill Quantity: 90,   # refills: 5  Last Office Visit: 8/21/18  Future Office visit:       Routing refill request to provider for review/approval because:  Drug not on the [facility], P or [facility] refill protocol or controlled substance

## 2019-01-09 DIAGNOSIS — M47.26 OSTEOARTHRITIS OF SPINE WITH RADICULOPATHY, LUMBAR REGION: ICD-10-CM

## 2019-01-09 DIAGNOSIS — M54.17 LUMBOSACRAL RADICULOPATHY AT S1: ICD-10-CM

## 2019-01-09 DIAGNOSIS — M48.061 SPINAL STENOSIS OF LUMBAR REGION: ICD-10-CM

## 2019-01-09 DIAGNOSIS — M54.42 MIDLINE LOW BACK PAIN WITH LEFT-SIDED SCIATICA, UNSPECIFIED CHRONICITY: ICD-10-CM

## 2019-01-10 RX ORDER — CYCLOBENZAPRINE HCL 10 MG
TABLET ORAL
Qty: 60 TABLET | Refills: 3 | Status: SHIPPED | OUTPATIENT
Start: 2019-01-10 | End: 2020-01-21

## 2019-01-10 NOTE — TELEPHONE ENCOUNTER
cyclobenzaprine (FLEXERIL) 10 MG tablet       Last Written Prescription Date:  4/28/17  Last Fill Quantity: 60,   # refills: 1  Last Office Visit: 8/21/18  Future Office visit:    Next 5 appointments (look out 90 days)    Jan 28, 2019  9:45 AM CST  (Arrive by 9:30 AM)  SHORT with Paz Maria MD  [facility] ([facility] ) [address]  [facility] [address]  [phone]           Routing refill request to provider for review/approval because:  Drug not active on patient's medication list    Med discontinued on 8/14/17 for reason- therapy completed. Please advise. Thank you!

## 2019-01-21 ENCOUNTER — MYC MEDICAL ADVICE (OUTPATIENT)
Dept: FAMILY MEDICINE | Facility: OTHER | Age: 49
End: 2019-01-21

## 2019-02-03 DIAGNOSIS — E03.9 HYPOTHYROIDISM, UNSPECIFIED TYPE: ICD-10-CM

## 2019-02-03 DIAGNOSIS — R00.0 TACHYCARDIA: ICD-10-CM

## 2019-02-03 NOTE — LETTER
[facility] [place]  [address] [place] [address]                    Selma Dubose  [address] [facility] [address]            February 4, 2019      Dear Selma Dubose    APPOINTMENT REMINDER:   Our records indicates that it is time for you to have labs.    Your current medication request for Matzim and Synthroid will be approved for one refill but you will need to have have labwork before any additional refills can be approved.  Taking care of your health is important to us, and ongoing visits with your provider are vital to your care.    We look forward to seeing you in the near future.  You may call our office at [phone] if you have questions.     Please disregard this notice if you have already made an appointment.          Sincerely,      Vivian Kidd  [facility] in [place]

## 2019-02-04 RX ORDER — DILTIAZEM HYDROCHLORIDE 240 MG/1
TABLET, EXTENDED RELEASE ORAL
Qty: 30 TABLET | Refills: 0 | Status: SHIPPED | OUTPATIENT
Start: 2019-02-04 | End: 2019-03-11

## 2019-02-04 RX ORDER — LEVOTHYROXINE SODIUM 112 UG/1
TABLET ORAL
Qty: 30 TABLET | Refills: 0 | Status: SHIPPED | OUTPATIENT
Start: 2019-02-04 | End: 2019-09-27

## 2019-02-04 NOTE — TELEPHONE ENCOUNTER
Synthroid  Last Written Prescription Date: 10/31/18  Last Fill Quantity: 90 # of Refills: 0  Last Office Visit: 8/21/18    Matzim LA  Last Written Prescription Date: 10/31/18  Last Fill Quantity: 90 # of Refills: 0  Last Office Visit: 8/21/18

## 2019-02-12 ENCOUNTER — MYC REFILL (OUTPATIENT)
Dept: FAMILY MEDICINE | Facility: OTHER | Age: 49
End: 2019-02-12

## 2019-02-12 DIAGNOSIS — R06.02 SOB (SHORTNESS OF BREATH): ICD-10-CM

## 2019-02-12 NOTE — TELEPHONE ENCOUNTER
Albuterol  Last Written Prescription Date: 1/18/19  Last Fill Quantity: 1 # of Refills: 2  Last Office Visit: 8/21/18

## 2019-02-13 RX ORDER — ALBUTEROL SULFATE 90 UG/1
2 AEROSOL, METERED RESPIRATORY (INHALATION) 4 TIMES DAILY
Qty: 17 G | Refills: 1 | Status: SHIPPED | OUTPATIENT
Start: 2019-02-13 | End: 2020-04-22

## 2019-02-26 NOTE — PROGRESS NOTES
SUBJECTIVE:   Selma Dubose is a 48 year old female who presents to clinic today for the following health issues:        Back Pain Follow Up    Description:   Location of pain:  center  Character of pain: pressure and pulling  and intermittent  Pain radiation: radiates into the right buttocks and radiates into the left buttocks  Since last visit, pain is:  worsened  New numbness or weakness in legs, not attributed to pain:  YES- left three outter toes she cannot feel them    Intensity: Currently 5/10    History:   Pain interferes with job: on short term leave  Therapies tried without relief: cold, heat, muscle relaxants, NSAIDs, Physical Therapy, rest, sitting, standing, steroid injection, stretch and surgery  Therapies tried with relief: steroid injection once relieved pain prior to surgery      Problem list and histories reviewed & adjusted, as indicated.  Additional history: as documented    Patient Active Problem List   Diagnosis     Acquired hypothyroidism     Anxiety     History of tachycardia     Obstructive sleep apnea     Tobacco dependence     Spina bifida with hydrocephalus (H)     history of brain aneurism     Tension-type headache, not intractable     history of Noncompliance with medication regimen     Osteoarthritis of spine with radiculopathy, lumbar region     Lumbosacral radiculopathy at S1     Spinal stenosis of lumbar region     Moderate persistent asthma without complication     Morbid obesity (H)     Chronic bilateral low back pain with sciatica     Past Surgical History:   Procedure Laterality Date     BACK SURGERY  08/29/2017    [facility]- L4-L5 laminectomy and decompression L5-S1 transforaminal lumbar fusion, L5-S! diskectomy, L4-S! 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,nelida type 1 syndrome,       Social History     Tobacco Use     Smoking  status: Current Every Day Smoker     Packs/day: 1.00     Years: 24.00     Pack years: 24.00     Types: Cigarettes     Smokeless tobacco: Never Used     Tobacco comment: Tried to Quit (NO); Passive Exposure (NO)   Substance Use Topics     Alcohol use: Yes     Comment: 3 Beers - WEEKLY     Family History   Problem Relation Age of Onset     Other - See Comments Father 54        Accidental Head Injury     Alzheimer Disease Other      Diabetes Other      Cancer Other         LUNG     Respiratory Other         Emphysema     Cancer Other         LUNG         Current Outpatient Medications   Medication Sig Dispense Refill     albuterol (PROAIR HFA) 108 (90 Base) MCG/ACT inhaler Inhale 2 puffs into the lungs 4 times daily 17 g 1     ALPRAZolam (XANAX) 0.25 MG tablet TAKE 1 TABLET BY MOUTH THREE TIMES DAILY AS NEEDED FOR ANXIETY 90 tablet 5     clindamycin (CLEOCIN T) 1 % lotion APPLY EXTERNALLY TO THE AFFECTED AREA TWICE DAILY 60 mL 0     cyclobenzaprine (FLEXERIL) 10 MG tablet TAKE 1 TABLET(10 MG) BY MOUTH TWICE DAILY AS NEEDED FOR MUSCLE SPASMS 60 tablet 3      MG tablet TAKE 1 TABLET EVERY 8 HOURS AS NEEDED FOR MODERATE PAIN 90 tablet 1     levothyroxine (SYNTHROID/LEVOTHROID) 112 MCG tablet TAKE 1 TABLET DAILY (DUE FOR LAB RECHECK IN 1 MONTH) 30 tablet 0     MATZIM  MG 24 hr tablet TAKE 1 TABLET DAILY 30 tablet 0     nicotine polacrilex (NICORETTE) 4 MG gum Take 4 mg by mouth       order for DME Knee high compression stockings, 15-20 mmHg compression  Sock wells (recent low back surgery) 1 Device 0     No Known Allergies  Recent Labs   Lab Test 08/21/18  0901 10/09/17  1222 08/14/17  1429  12/27/13  1451 05/07/13  0931   LDL 77  --   --   --  106 98   HDL 18*  --   --   --  33* 23*   TRIG 294*  --   --   --  45 278*   ALT  --   --  62*  --   --   --    CR  --  0.67 0.69   < > 0.73 0.87   GFRESTIMATED  --  >90 >90  Non African American GFR Calc     < > 87 71   GFRESTBLACK  --  >90 >90   GFR  Calc     < > >90 86   POTASSIUM  --  4.3 4.0   < > 3.7 4.3   TSH 4.18* 3.92 5.73*   < > 4.69 7.79*    < > = values in this interval not displayed.      BP Readings from Last 3 Encounters:   02/27/19 128/74   08/21/18 118/72   07/31/18 124/74    Wt Readings from Last 3 Encounters:   02/27/19 119.7 kg (264 lb)   08/21/18 117.4 kg (258 lb 12.8 oz)   07/31/18 118.8 kg (262 lb)                  Labs reviewed in EPIC    Reviewed and updated as needed this visit by clinical staff       Reviewed and updated as needed this visit by Provider         ROS:  Constitutional, HEENT, cardiovascular, pulmonary, gi and gu systems are negative, except as otherwise noted.    OBJECTIVE:     /74 (BP Location: Right arm, Patient Position: Sitting, Cuff Size: Adult Regular)   Pulse 77   Temp 99.4  F (37.4  C) (Tympanic)   Resp 16   Wt 119.7 kg (264 lb)   SpO2 95%   BMI 43.93 kg/m    Body mass index is 43.93 kg/m .     GENERAL: healthy, alert and no distress  NECK: no adenopathy, no asymmetry, masses, or scars and thyroid normal to palpation  CV: regular rate and rhythm, normal S1 S2, no S3 or S4, no murmur, click or rub, no peripheral edema and peripheral pulses strong  MS: Very tight and tender lumbar spine throughout - spasm on the left. Stiff with ROM, guarded gait  SKIN: no suspicious lesions or rashes  PSYCH: mentation appears normal, affect normal/bright        ASSESSMENT/PLAN:     1. Lumbosacral radiculopathy at S1  2. Osteoarthritis of spine with radiculopathy, lumbar region  - Continue Follow-up with Neurosurgeon  - Ice (stop heat)  - Anti inflammatory as needed  - Flexeril as needed        Nga Yousif NP  [facility]

## 2019-02-27 ENCOUNTER — OFFICE VISIT (OUTPATIENT)
Dept: FAMILY MEDICINE | Facility: OTHER | Age: 49
End: 2019-02-27
Attending: NURSE PRACTITIONER
Payer: COMMERCIAL

## 2019-02-27 VITALS
RESPIRATION RATE: 16 BRPM | TEMPERATURE: 99.4 F | HEART RATE: 77 BPM | DIASTOLIC BLOOD PRESSURE: 74 MMHG | BODY MASS INDEX: 43.93 KG/M2 | OXYGEN SATURATION: 95 % | SYSTOLIC BLOOD PRESSURE: 128 MMHG | WEIGHT: 264 LBS

## 2019-02-27 DIAGNOSIS — M47.26 OSTEOARTHRITIS OF SPINE WITH RADICULOPATHY, LUMBAR REGION: ICD-10-CM

## 2019-02-27 DIAGNOSIS — M54.17 LUMBOSACRAL RADICULOPATHY AT S1: Primary | ICD-10-CM

## 2019-02-27 PROCEDURE — 99213 OFFICE O/P EST LOW 20 MIN: CPT | Performed by: NURSE PRACTITIONER

## 2019-02-27 ASSESSMENT — PAIN SCALES - GENERAL: PAINLEVEL: MODERATE PAIN (5)

## 2019-02-27 NOTE — NURSING NOTE
"Chief Complaint   Patient presents with     Musculoskeletal Problem       Initial /74 (BP Location: Right arm, Patient Position: Sitting, Cuff Size: Adult Regular)   Pulse 77   Temp 99.4  F (37.4  C) (Tympanic)   Resp 16   Wt 119.7 kg (264 lb)   SpO2 95%   BMI 43.93 kg/m   Estimated body mass index is 43.93 kg/m  as calculated from the following:    Height as of 8/21/18: 1.651 m (5' 5\").    Weight as of this encounter: 119.7 kg (264 lb).  Medication Reconciliation: complete    Nadeen Hernández MA  "

## 2019-02-27 NOTE — PATIENT INSTRUCTIONS
ASSESSMENT/PLAN:     1. Lumbosacral radiculopathy at S1  2. Osteoarthritis of spine with radiculopathy, lumbar region  - Continue Follow-up with Neurosurgeon  - Ice (stop heat)  - Anti inflammatory as needed  - Flexeril as needed        Nga Yousif NP  [facility]

## 2019-02-28 ENCOUNTER — MYC MEDICAL ADVICE (OUTPATIENT)
Dept: FAMILY MEDICINE | Facility: OTHER | Age: 49
End: 2019-02-28

## 2019-03-07 ENCOUNTER — TRANSFERRED RECORDS (OUTPATIENT)
Dept: HEALTH INFORMATION MANAGEMENT | Facility: CLINIC | Age: 49
End: 2019-03-07

## 2019-03-11 ENCOUNTER — MYC MEDICAL ADVICE (OUTPATIENT)
Dept: FAMILY MEDICINE | Facility: OTHER | Age: 49
End: 2019-03-11

## 2019-03-11 DIAGNOSIS — M48.061 SPINAL STENOSIS OF LUMBAR REGION, UNSPECIFIED WHETHER NEUROGENIC CLAUDICATION PRESENT: Primary | ICD-10-CM

## 2019-03-11 DIAGNOSIS — M15.9 OSTEOARTHRITIS OF MULTIPLE JOINTS, UNSPECIFIED OSTEOARTHRITIS TYPE: ICD-10-CM

## 2019-03-11 DIAGNOSIS — Z98.1 S/P LUMBAR FUSION: ICD-10-CM

## 2019-03-11 DIAGNOSIS — R00.0 TACHYCARDIA: ICD-10-CM

## 2019-03-12 ENCOUNTER — MYC MEDICAL ADVICE (OUTPATIENT)
Dept: FAMILY MEDICINE | Facility: OTHER | Age: 49
End: 2019-03-12

## 2019-03-12 RX ORDER — IBUPROFEN 600 MG/1
TABLET ORAL
Qty: 90 TABLET | Refills: 1 | Status: SHIPPED | OUTPATIENT
Start: 2019-03-12 | End: 2019-09-27

## 2019-03-12 RX ORDER — DILTIAZEM HYDROCHLORIDE 240 MG/1
TABLET, EXTENDED RELEASE ORAL
Qty: 30 TABLET | Refills: 0 | Status: SHIPPED | OUTPATIENT
Start: 2019-03-12 | End: 2019-04-10

## 2019-03-12 NOTE — TELEPHONE ENCOUNTER
Referral ordered.  Dr. oRdriguez can obtain scans from [facility].  I am working on forms now.

## 2019-03-13 ENCOUNTER — TELEPHONE (OUTPATIENT)
Dept: FAMILY MEDICINE | Facility: OTHER | Age: 49
End: 2019-03-13

## 2019-03-13 NOTE — TELEPHONE ENCOUNTER
Noted, we can always release her back to work with restrictions if she feels she would be able to complete this.  We can do the release when the time comes.

## 2019-03-14 ENCOUNTER — TELEPHONE (OUTPATIENT)
Dept: FAMILY MEDICINE | Facility: OTHER | Age: 49
End: 2019-03-14

## 2019-03-14 DIAGNOSIS — Z98.1 S/P LUMBAR FUSION: ICD-10-CM

## 2019-03-14 DIAGNOSIS — M48.061 SPINAL STENOSIS OF LUMBAR REGION, UNSPECIFIED WHETHER NEUROGENIC CLAUDICATION PRESENT: Primary | ICD-10-CM

## 2019-03-14 NOTE — TELEPHONE ENCOUNTER
I don't know if patient will be keeping this appointment.   She states that per her insurance, Dr. Rodriguez is covered for office visit, but would not be covered if she needs surgery.  She was going to look into this, and if she can see Dr. Rodriguez, I will order MRI.

## 2019-03-14 NOTE — TELEPHONE ENCOUNTER
MRI Lumbar Spine needed before Dr. Rodriguez would be able to see patient.  Please call Reynold [phone] if any questions.

## 2019-03-21 ENCOUNTER — HOSPITAL ENCOUNTER (OUTPATIENT)
Dept: MRI IMAGING | Facility: HOSPITAL | Age: 49
Discharge: HOME | End: 2019-03-21
Attending: FAMILY MEDICINE | Admitting: FAMILY MEDICINE
Payer: COMMERCIAL

## 2019-03-21 DIAGNOSIS — Z98.1 S/P LUMBAR FUSION: ICD-10-CM

## 2019-03-21 DIAGNOSIS — M48.061 SPINAL STENOSIS OF LUMBAR REGION, UNSPECIFIED WHETHER NEUROGENIC CLAUDICATION PRESENT: ICD-10-CM

## 2019-03-21 PROCEDURE — 72148 MRI LUMBAR SPINE W/O DYE: CPT | Mod: TC

## 2019-03-25 ENCOUNTER — TELEPHONE (OUTPATIENT)
Dept: FAMILY MEDICINE | Facility: OTHER | Age: 49
End: 2019-03-25

## 2019-03-26 NOTE — TELEPHONE ENCOUNTER
We can let her know what the Radiologist said, but she really needs to have the Neurosurgeon look at them.

## 2019-03-29 NOTE — TELEPHONE ENCOUNTER
Patient returned call and was notified that imaging was sent to neurology for her upcoming appointment . I did read her impression of results but told her that neurosurgeon needed to look at it and explain it to her.

## 2019-04-05 NOTE — PROGRESS NOTES
SUBJECTIVE:   CC: Selma Dubose is an 48 year old woman who presents for preventive health visit.     Healthy Habits:    Do you get at least three servings of calcium containing foods daily (dairy, green leafy vegetables, etc.)? NO    Amount of exercise or daily activities, outside of work: 3 day(s) per week    Problems taking medications regularly No    Medication side effects: No    Have you had an eye exam in the past two years? yes    Do you see a dentist twice per year? yes    Do you have sleep apnea, excessive snoring or daytime drowsiness?yes      Asthma Follow-Up    Was ACT completed today?    Yes    ACT Total Scores 4/8/2019   ACT TOTAL SCORE (Goal Greater than or Equal to 20) 21   In the past 12 months, how many times did you visit the emergency room for your asthma without being admitted to the hospital? 0   In the past 12 months, how many times were you hospitalized overnight because of your asthma? 0       Recent asthma triggers that patient is dealing with: None      Hypothyroidism Follow-up      Since last visit, patient describes the following symptoms: Weight stable, no hair loss, no skin changes, no constipation, no loose stools      Today's PHQ-2 Score:   PHQ-2 ( 1999 [employer]) 5/30/2013   Q1: Little interest or pleasure in doing things 1   Q2: Feeling down, depressed or hopeless 2   PHQ-2 Score 3         Social History     Tobacco Use     Smoking status: Current Every Day Smoker     Packs/day: 1.00     Years: 24.00     Pack years: 24.00     Types: Cigarettes     Smokeless tobacco: Never Used     Tobacco comment: Tried to Quit (NO); Passive Exposure (NO)   Substance Use Topics     Alcohol use: Yes     Comment: 3 Beers - WEEKLY     If you drink alcohol do you typically have >3 drinks per day or >7 drinks per week? No                     Reviewed orders with patient.  Reviewed health maintenance and updated orders accordingly - Yes  Patient Active Problem List   Diagnosis     Acquired  hypothyroidism     Anxiety     History of tachycardia     Obstructive sleep apnea     Tobacco dependence     Spina bifida with hydrocephalus (H)     history of brain aneurism     Tension-type headache, not intractable     history of Noncompliance with medication regimen     Osteoarthritis of spine with radiculopathy, lumbar region     Lumbosacral radiculopathy at S1     Spinal stenosis of lumbar region     Moderate persistent asthma without complication     Morbid obesity (H)     Chronic bilateral low back pain with sciatica     Past Surgical History:   Procedure Laterality Date     BACK SURGERY  08/29/2017    [facility]- L4-L5 laminectomy and decompression L5-S1 transforaminal lumbar fusion, L5-S! diskectomy, L4-S! 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,nelida type 1 syndrome,       Social History     Tobacco Use     Smoking status: Current Every Day Smoker     Packs/day: 1.00     Years: 24.00     Pack years: 24.00     Types: Cigarettes     Smokeless tobacco: Never Used     Tobacco comment: Tried to Quit (NO); Passive Exposure (NO)   Substance Use Topics     Alcohol use: Yes     Comment: 3 Beers - WEEKLY     Family History   Problem Relation Age of Onset     Other - See Comments Father 54        Accidental Head Injury     Alzheimer Disease Other      Diabetes Other      Cancer Other         LUNG     Respiratory Other         Emphysema     Cancer Other         LUNG         Current Outpatient Medications   Medication Sig Dispense Refill     albuterol (PROAIR HFA) 108 (90 Base) MCG/ACT inhaler Inhale 2 puffs into the lungs 4 times daily 17 g 1     ALPRAZolam (XANAX) 0.25 MG tablet TAKE 1 TABLET BY MOUTH THREE TIMES DAILY AS NEEDED FOR ANXIETY 90 tablet 5     clindamycin (CLEOCIN T) 1 % lotion APPLY EXTERNALLY TO THE AFFECTED AREA TWICE DAILY 60 mL 0     cyclobenzaprine  (FLEXERIL) 10 MG tablet TAKE 1 TABLET(10 MG) BY MOUTH TWICE DAILY AS NEEDED FOR MUSCLE SPASMS 60 tablet 3      MG tablet TAKE 1 TABLET EVERY 8 HOURS AS NEEDED FOR MODERATE PAIN 90 tablet 1     levothyroxine (SYNTHROID/LEVOTHROID) 112 MCG tablet TAKE 1 TABLET DAILY (DUE FOR LAB RECHECK IN 1 MONTH) 30 tablet 0     MATZIM  MG 24 hr tablet TAKE 1 TABLET DAILY 30 tablet 0     nicotine polacrilex (NICORETTE) 4 MG gum Take 4 mg by mouth       No Known Allergies    Mammogram Screening: Patient under age 50, mutual decision reflected in health maintenance.      Pertinent mammograms are reviewed under the imaging tab.  History of abnormal Pap smear: YES - other categories - see link Cervical Cytology Screening Guidelines  Patient declines pap today due to menses, she will return at another time    PAP / HPV Latest Ref Rng & Units 7/31/2015   PAP - LSIL(A)   HPV 16 DNA NEG Positive(A)   HPV 18 DNA NEG Negative   OTHER HR HPV NEG Negative     Reviewed and updated as needed this visit by clinical staff  Tobacco  Allergies  Meds         Reviewed and updated as needed this visit by Provider            ROS:  CONSTITUTIONAL: NEGATIVE for fever, chills, change in weight  INTEGUMENTARU/SKIN: NEGATIVE for worrisome rashes, moles or lesions  EYES: NEGATIVE for vision changes or irritation  ENT: NEGATIVE for ear, mouth and throat problems  RESP: NEGATIVE for significant cough or SOB  BREAST: NEGATIVE for masses, tenderness or discharge  CV: NEGATIVE for chest pain, palpitations or peripheral edema  GI: NEGATIVE for nausea, abdominal pain, heartburn, or change in bowel habits  : NEGATIVE for unusual urinary or vaginal symptoms. Periods are regular.  MUSCULOSKELETAL: NEGATIVE for significant arthralgias or myalgia  NEURO: NEGATIVE for weakness, dizziness or paresthesias  PSYCHIATRIC: NEGATIVE for changes in mood or affect    OBJECTIVE:   /72 (BP Location: Right arm, Patient Position: Sitting, Cuff Size: Adult Large)    Pulse 88   Temp 97.4  F (36.3  C) (Tympanic)   Resp 16   Ht 1.524 m (5')   Wt 124.7 kg (275 lb)   SpO2 95%   BMI 53.71 kg/m    EXAM:  GENERAL: healthy, alert and no distress  EYES: Eyes grossly normal to inspection, PERRL and conjunctivae and sclerae normal  HENT: ear canals and TM's normal, nose and mouth without ulcers or lesions  NECK: no adenopathy  RESP: lungs clear to auscultation - no rales, rhonchi or wheezes  CV: regular rate and rhythm, normal S1 S2, no S3 or S4, no murmur, click or rub, no peripheral edema and peripheral pulses strong  ABDOMEN: soft, nontender, no hepatosplenomegaly, no masses and bowel sounds normal  SKIN: no suspicious lesions or rashes  NEURO: Normal strength and tone, mentation intact and speech normal  PSYCH: mentation appears normal, affect normal/bright    Diagnostic Test Results:  none     ASSESSMENT/PLAN:       ICD-10-CM    1. Routine general medical examination at a health care facility Z00.00 Basic metabolic panel     Ferritin     CBC with platelets   2. Moderate persistent asthma without complication J45.40    3. Acquired hypothyroidism E03.9 TSH with free T4 reflex   4. Tachycardia R00.0 CANCELED: Basic metabolic panel   5. Need for vaccination Z23        COUNSELING:   Reviewed preventive health counseling, as reflected in patient instructions    BP Readings from Last 1 Encounters:   04/08/19 122/72     Estimated body mass index is 53.71 kg/m  as calculated from the following:    Height as of this encounter: 1.524 m (5').    Weight as of this encounter: 124.7 kg (275 lb).           reports that she has been smoking cigarettes.  She has a 24.00 pack-year smoking history. She has never used smokeless tobacco.  Tobacco Cessation Action Plan: Information offered: Patient not interested at this time    Counseling Resources:  ATP IV Guidelines  Pooled Cohorts Equation Calculator  Breast Cancer Risk Calculator  FRAX Risk Assessment  ICSI Preventive Guidelines  Dietary  Guidelines for Americans, 2010  USDA's MyPlate  ASA Prophylaxis  Lung CA Screening    Paz Maria MD  [facility]

## 2019-04-08 ENCOUNTER — OFFICE VISIT (OUTPATIENT)
Dept: FAMILY MEDICINE | Facility: OTHER | Age: 49
End: 2019-04-08
Attending: FAMILY MEDICINE
Payer: COMMERCIAL

## 2019-04-08 VITALS
SYSTOLIC BLOOD PRESSURE: 122 MMHG | OXYGEN SATURATION: 95 % | TEMPERATURE: 97.4 F | DIASTOLIC BLOOD PRESSURE: 72 MMHG | WEIGHT: 275 LBS | HEIGHT: 60 IN | HEART RATE: 88 BPM | BODY MASS INDEX: 53.99 KG/M2 | RESPIRATION RATE: 16 BRPM

## 2019-04-08 DIAGNOSIS — E03.9 ACQUIRED HYPOTHYROIDISM: ICD-10-CM

## 2019-04-08 DIAGNOSIS — J45.40 MODERATE PERSISTENT ASTHMA WITHOUT COMPLICATION: ICD-10-CM

## 2019-04-08 DIAGNOSIS — R00.0 TACHYCARDIA: ICD-10-CM

## 2019-04-08 DIAGNOSIS — Z00.00 ROUTINE GENERAL MEDICAL EXAMINATION AT A HEALTH CARE FACILITY: Primary | ICD-10-CM

## 2019-04-08 DIAGNOSIS — Z23 NEED FOR VACCINATION: ICD-10-CM

## 2019-04-08 LAB
ANION GAP SERPL CALCULATED.3IONS-SCNC: 8 MMOL/L (ref 3–14)
BUN SERPL-MCNC: 9 MG/DL (ref 7–30)
CALCIUM SERPL-MCNC: 9 MG/DL (ref 8.5–10.1)
CHLORIDE SERPL-SCNC: 105 MMOL/L (ref 94–109)
CO2 SERPL-SCNC: 25 MMOL/L (ref 20–32)
CREAT SERPL-MCNC: 0.62 MG/DL (ref 0.52–1.04)
ERYTHROCYTE [DISTWIDTH] IN BLOOD BY AUTOMATED COUNT: 14 % (ref 10–15)
FERRITIN SERPL-MCNC: 41 NG/ML (ref 8–252)
GFR SERPL CREATININE-BSD FRML MDRD: >90 ML/MIN/{1.73_M2}
GLUCOSE SERPL-MCNC: 102 MG/DL (ref 70–99)
HCT VFR BLD AUTO: 43 % (ref 35–47)
HGB BLD-MCNC: 14.7 G/DL (ref 11.7–15.7)
MCH RBC QN AUTO: 33.3 PG (ref 26.5–33)
MCHC RBC AUTO-ENTMCNC: 34.2 G/DL (ref 31.5–36.5)
MCV RBC AUTO: 97 FL (ref 78–100)
PLATELET # BLD AUTO: 358 10E9/L (ref 150–450)
POTASSIUM SERPL-SCNC: 4.3 MMOL/L (ref 3.4–5.3)
RBC # BLD AUTO: 4.42 10E12/L (ref 3.8–5.2)
SODIUM SERPL-SCNC: 138 MMOL/L (ref 133–144)
T4 FREE SERPL-MCNC: 0.99 NG/DL (ref 0.76–1.46)
TSH SERPL DL<=0.005 MIU/L-ACNC: 5.84 MU/L (ref 0.4–4)
WBC # BLD AUTO: 8.8 10E9/L (ref 4–11)

## 2019-04-08 PROCEDURE — 85027 COMPLETE CBC AUTOMATED: CPT | Performed by: FAMILY MEDICINE

## 2019-04-08 PROCEDURE — 84439 ASSAY OF FREE THYROXINE: CPT | Performed by: FAMILY MEDICINE

## 2019-04-08 PROCEDURE — 99396 PREV VISIT EST AGE 40-64: CPT | Performed by: FAMILY MEDICINE

## 2019-04-08 PROCEDURE — 82728 ASSAY OF FERRITIN: CPT | Performed by: FAMILY MEDICINE

## 2019-04-08 PROCEDURE — 80048 BASIC METABOLIC PNL TOTAL CA: CPT | Performed by: FAMILY MEDICINE

## 2019-04-08 PROCEDURE — 36415 COLL VENOUS BLD VENIPUNCTURE: CPT | Performed by: FAMILY MEDICINE

## 2019-04-08 PROCEDURE — 84443 ASSAY THYROID STIM HORMONE: CPT | Performed by: FAMILY MEDICINE

## 2019-04-08 ASSESSMENT — MIFFLIN-ST. JEOR: SCORE: 1793.89

## 2019-04-08 ASSESSMENT — ANXIETY QUESTIONNAIRES
5. BEING SO RESTLESS THAT IT IS HARD TO SIT STILL: NOT AT ALL
6. BECOMING EASILY ANNOYED OR IRRITABLE: SEVERAL DAYS
7. FEELING AFRAID AS IF SOMETHING AWFUL MIGHT HAPPEN: NOT AT ALL
2. NOT BEING ABLE TO STOP OR CONTROL WORRYING: SEVERAL DAYS
GAD7 TOTAL SCORE: 6
IF YOU CHECKED OFF ANY PROBLEMS ON THIS QUESTIONNAIRE, HOW DIFFICULT HAVE THESE PROBLEMS MADE IT FOR YOU TO DO YOUR WORK, TAKE CARE OF THINGS AT HOME, OR GET ALONG WITH OTHER PEOPLE: NOT DIFFICULT AT ALL
3. WORRYING TOO MUCH ABOUT DIFFERENT THINGS: SEVERAL DAYS
1. FEELING NERVOUS, ANXIOUS, OR ON EDGE: MORE THAN HALF THE DAYS

## 2019-04-08 ASSESSMENT — PATIENT HEALTH QUESTIONNAIRE - PHQ9
SUM OF ALL RESPONSES TO PHQ QUESTIONS 1-9: 8
5. POOR APPETITE OR OVEREATING: SEVERAL DAYS

## 2019-04-08 ASSESSMENT — PAIN SCALES - GENERAL: PAINLEVEL: MODERATE PAIN (4)

## 2019-04-09 ASSESSMENT — ANXIETY QUESTIONNAIRES: GAD7 TOTAL SCORE: 6

## 2019-04-09 ASSESSMENT — ASTHMA QUESTIONNAIRES: ACT_TOTALSCORE: 21

## 2019-04-10 DIAGNOSIS — R00.0 TACHYCARDIA: ICD-10-CM

## 2019-04-11 RX ORDER — DILTIAZEM HYDROCHLORIDE 240 MG/1
TABLET, EXTENDED RELEASE ORAL
Qty: 30 TABLET | Refills: 5 | Status: SHIPPED | OUTPATIENT
Start: 2019-04-11 | End: 2019-09-27

## 2019-04-11 NOTE — TELEPHONE ENCOUNTER
Matzim LA 240mcg      Last Written Prescription Date:  3/12/19  Last Fill Quantity: 30,   # refills: 0  Last Office Visit: 4/8/19  Future Office visit:       Routing refill request to provider for review/approval because:

## 2019-04-22 DIAGNOSIS — F41.9 ANXIETY: ICD-10-CM

## 2019-04-22 NOTE — TELEPHONE ENCOUNTER
Xanax       Last Written Prescription Date:  11/19/2018  Last Fill Quantity: 90,   # refills: 5  Last Office Visit: 4/8/2019  Future Office visit:       Routing refill request to provider for review/approval because:  Drug not on the [facility], [facility] or [facility] refill protocol or controlled substance

## 2019-04-23 ENCOUNTER — TRANSFERRED RECORDS (OUTPATIENT)
Dept: HEALTH INFORMATION MANAGEMENT | Facility: CLINIC | Age: 49
End: 2019-04-23

## 2019-04-23 RX ORDER — ALPRAZOLAM 0.25 MG
TABLET ORAL
Qty: 90 TABLET | Refills: 5 | Status: SHIPPED | OUTPATIENT
Start: 2019-04-23 | End: 2019-09-25

## 2019-04-25 ENCOUNTER — TELEPHONE (OUTPATIENT)
Dept: FAMILY MEDICINE | Facility: OTHER | Age: 49
End: 2019-04-25

## 2019-04-25 NOTE — TELEPHONE ENCOUNTER
Rec'd Tobacco Cessation Referral from Dr. Rodriguez, [facility] for patient to return call to schedule with Dr. Ramsey.

## 2019-04-29 ENCOUNTER — TELEPHONE (OUTPATIENT)
Dept: FAMILY MEDICINE | Facility: OTHER | Age: 49
End: 2019-04-29

## 2019-04-29 NOTE — TELEPHONE ENCOUNTER
For Tobacco Cessation LVM requesting return call to schedule with Dr. Ramsey per Dr. Rodriguez's referral, [facility]

## 2019-05-07 ENCOUNTER — MYC MEDICAL ADVICE (OUTPATIENT)
Dept: FAMILY MEDICINE | Facility: OTHER | Age: 49
End: 2019-05-07

## 2019-05-10 ENCOUNTER — MYC MEDICAL ADVICE (OUTPATIENT)
Dept: FAMILY MEDICINE | Facility: OTHER | Age: 49
End: 2019-05-10

## 2019-05-10 NOTE — TELEPHONE ENCOUNTER
Pt notified that a copy of forms is coming to her in the mail and the one was faxed as form requested.

## 2019-05-14 ENCOUNTER — OFFICE VISIT (OUTPATIENT)
Dept: FAMILY MEDICINE | Facility: OTHER | Age: 49
End: 2019-05-14
Attending: FAMILY MEDICINE
Payer: COMMERCIAL

## 2019-05-14 VITALS
RESPIRATION RATE: 14 BRPM | BODY MASS INDEX: 45.32 KG/M2 | HEART RATE: 83 BPM | DIASTOLIC BLOOD PRESSURE: 82 MMHG | HEIGHT: 65 IN | SYSTOLIC BLOOD PRESSURE: 132 MMHG | OXYGEN SATURATION: 95 % | TEMPERATURE: 97.2 F | WEIGHT: 272 LBS

## 2019-05-14 DIAGNOSIS — Z72.0 TOBACCO ABUSE: ICD-10-CM

## 2019-05-14 DIAGNOSIS — Z71.6 TOBACCO ABUSE COUNSELING: ICD-10-CM

## 2019-05-14 PROCEDURE — 99214 OFFICE O/P EST MOD 30 MIN: CPT | Performed by: FAMILY MEDICINE

## 2019-05-14 RX ORDER — BUPROPION HYDROCHLORIDE 150 MG/1
150 TABLET ORAL EVERY MORNING
Qty: 30 TABLET | Refills: 2 | Status: SHIPPED | OUTPATIENT
Start: 2019-05-14 | End: 2019-11-04

## 2019-05-14 ASSESSMENT — PAIN SCALES - GENERAL: PAINLEVEL: MODERATE PAIN (5)

## 2019-05-14 ASSESSMENT — MIFFLIN-ST. JEOR: SCORE: 1859.66

## 2019-05-14 NOTE — NURSING NOTE
"Chief Complaint   Patient presents with     Smoking Cessation       Initial /82 (BP Location: Right arm, Patient Position: Sitting, Cuff Size: Adult Large)   Pulse 83   Temp 97.2  F (36.2  C) (Tympanic)   Resp 14   Ht 1.651 m (5' 5\")   Wt 123.4 kg (272 lb)   SpO2 95%   BMI 45.26 kg/m   Estimated body mass index is 45.26 kg/m  as calculated from the following:    Height as of this encounter: 1.651 m (5' 5\").    Weight as of this encounter: 123.4 kg (272 lb).  Medication Reconciliation: complete    Luz Eldridge LPN    "

## 2019-05-14 NOTE — PATIENT INSTRUCTIONS

## 2019-05-15 NOTE — PROGRESS NOTES
SUBJECTIVE:   Selma Dubose is a 48 year old female who presents to clinic today for the following   health issues:      Smoking Cessation      Description (location/character/radiation): Patient is currently smoking.  She did recently see Neurosurgery and no surgical options will be explored unless patient quits smoking.  Patient doesn't really want to quit smoking but knows she has to and is willing to work on it.  She does feel the gum works well for her, and she is willing to consider additional medications as recommended.    Therapies tried and outcome: Nicotine gum, helpful         Additional history: as documented    Reviewed  and updated as needed this visit by clinical staff  Tobacco         Reviewed and updated as needed this visit by Provider         Patient Active Problem List   Diagnosis     Acquired hypothyroidism     Anxiety     History of tachycardia     Obstructive sleep apnea     Tobacco dependence     Spina bifida with hydrocephalus (H)     history of brain aneurism     Tension-type headache, not intractable     history of Noncompliance with medication regimen     Osteoarthritis of spine with radiculopathy, lumbar region     Lumbosacral radiculopathy at S1     Spinal stenosis of lumbar region     Moderate persistent asthma without complication     Morbid obesity (H)     Chronic bilateral low back pain with sciatica     Past Surgical History:   Procedure Laterality Date     BACK SURGERY  08/29/2017    [facility]- L4-L5 laminectomy and decompression L5-S1 transforaminal lumbar fusion, L5-S! diskectomy, L4-S! 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,nelida type 1 syndrome,       Social History     Tobacco Use     Smoking status: Current Every Day Smoker     Packs/day: 1.00     Years: 24.00     Pack years: 24.00     Types: Cigarettes     Smokeless  "tobacco: Never Used     Tobacco comment: Tried to Quit (NO); Passive Exposure (NO)   Substance Use Topics     Alcohol use: Yes     Comment: 3 Beers - WEEKLY     Family History   Problem Relation Age of Onset     Other - See Comments Father 54        Accidental Head Injury     Alzheimer Disease Other      Diabetes Other      Cancer Other         LUNG     Respiratory Other         Emphysema     Cancer Other         LUNG         Current Outpatient Medications   Medication Sig Dispense Refill     buPROPion (WELLBUTRIN XL) 150 MG 24 hr tablet Take 1 tablet (150 mg) by mouth every morning 30 tablet 2     nicotine polacrilex (NICORETTE) 4 MG gum Take 1 each (4 mg) by mouth as needed for smoking cessation 100 each 2     albuterol (PROAIR HFA) 108 (90 Base) MCG/ACT inhaler Inhale 2 puffs into the lungs 4 times daily 17 g 1     ALPRAZolam (XANAX) 0.25 MG tablet TAKE 1 TABLET BY MOUTH THREE TIMES DAILY AS NEEDED FOR ANXIETY 90 tablet 5     clindamycin (CLEOCIN T) 1 % lotion APPLY EXTERNALLY TO THE AFFECTED AREA TWICE DAILY 60 mL 0     cyclobenzaprine (FLEXERIL) 10 MG tablet TAKE 1 TABLET(10 MG) BY MOUTH TWICE DAILY AS NEEDED FOR MUSCLE SPASMS 60 tablet 3      MG tablet TAKE 1 TABLET EVERY 8 HOURS AS NEEDED FOR MODERATE PAIN 90 tablet 1     levothyroxine (SYNTHROID/LEVOTHROID) 112 MCG tablet TAKE 1 TABLET DAILY (DUE FOR LAB RECHECK IN 1 MONTH) 30 tablet 0     MATZIM  MG 24 hr tablet TAKE 1 TABLET DAILY 30 tablet 5     No Known Allergies    ROS:  Constitutional, HEENT, cardiovascular, pulmonary, gi and gu systems are negative, except as otherwise noted.    OBJECTIVE:     /82 (BP Location: Right arm, Patient Position: Sitting, Cuff Size: Adult Large)   Pulse 83   Temp 97.2  F (36.2  C) (Tympanic)   Resp 14   Ht 1.651 m (5' 5\")   Wt 123.4 kg (272 lb)   SpO2 95%   BMI 45.26 kg/m    Body mass index is 45.26 kg/m .  GENERAL: healthy, alert and no distress  PSYCH: mentation appears normal, affect " normal/bright    Diagnostic Test Results:  none     ASSESSMENT/PLAN:     Tobacco Cessation:   reports that she has been smoking cigarettes.  She has a 24.00 pack-year smoking history. She has never used smokeless tobacco.  Tobacco Cessation Action Plan: Pharmacotherapies : Zyban/Wellbutrin and other Nicotine replacement      1. Tobacco abuse  After discussion, patient does agree to start Wellbutrin and use gum as needed.  We talked about setting a quit date and coming up with a plan of action for cravings/habints/ etc.  Follow-up in about 1-2 months to review progress, sooner as needed.  Quit Plan referral made as well.  - QUITPLAN  Referral; Future  - Tobacco Cessation - Order to Satisfy Health Maintenance  - buPROPion (WELLBUTRIN XL) 150 MG 24 hr tablet; Take 1 tablet (150 mg) by mouth every morning  Dispense: 30 tablet; Refill: 2  - nicotine polacrilex (NICORETTE) 4 MG gum; Take 1 each (4 mg) by mouth as needed for smoking cessation  Dispense: 100 each; Refill: 2    2. Tobacco abuse counseling      FUTURE APPOINTMENTS:       - Follow-up visit in 1-2 months, sooner as needed.    Over 30 minutes are spent with the patient, over 20 minutes of which was in education and counseling regarding current conditions and treatment/therapy options/risks/benefits/etc, including medication options for smoking cessation, plan of action, and need for smoking cessation.      Paz Maria MD  [facility]

## 2019-05-22 ENCOUNTER — MYC MEDICAL ADVICE (OUTPATIENT)
Dept: FAMILY MEDICINE | Facility: OTHER | Age: 49
End: 2019-05-22

## 2019-05-28 ENCOUNTER — MYC MEDICAL ADVICE (OUTPATIENT)
Dept: FAMILY MEDICINE | Facility: OTHER | Age: 49
End: 2019-05-28

## 2019-05-28 NOTE — TELEPHONE ENCOUNTER
Please let her know she can drop off the forms, but as much information she can provide me in regards to her forms will be helpful in completing them.

## 2019-06-06 NOTE — TELEPHONE ENCOUNTER
Form printed if you can start completing as much as you can with information patient sent in [employer] message.

## 2019-07-09 ENCOUNTER — MYC MEDICAL ADVICE (OUTPATIENT)
Dept: FAMILY MEDICINE | Facility: OTHER | Age: 49
End: 2019-07-09

## 2019-09-25 DIAGNOSIS — F41.9 ANXIETY: ICD-10-CM

## 2019-09-27 RX ORDER — ALPRAZOLAM 0.25 MG
TABLET ORAL
Qty: 90 TABLET | Refills: 5 | Status: SHIPPED | OUTPATIENT
Start: 2019-09-27 | End: 2020-02-27

## 2019-09-27 NOTE — TELEPHONE ENCOUNTER
ALPRAZolam (XANAX) 0.25 MG tablet      Last Written Prescription Date:  4-23-19  Last Fill Quantity: 90,   # refills: 5  Last Office Visit: 5-14-19  Future Office visit:       Routing refill request to provider for review/approval because:  Drug not on the [facility], [facility] or [facility] refill protocol or controlled substance

## 2019-10-28 NOTE — PROGRESS NOTES
Subjective     Selma Dubose is a 49 year old female who presents to clinic today for the following health issues:    HPI   Musculoskeletal problem/pain      Duration: years    Description  Location: Bilateral knees, back pain, and bilateral hip pain    Intensity:  6/10    Accompanying signs and symptoms: radiation of pain to hips and knees, numbness, swelling and discoloration of lower extremities due to swelling pt states    History  Previous similar problem: YES  Previous evaluation:  x-ray, MRI and orthopedic evaluation    Precipitating or alleviating factors:  Trauma or overuse: no   Aggravating factors include: sitting, standing, walking, climbing stairs, lifting and exercise    Therapies tried and outcome: rest/inactivity, heat, ice, stretching, exercises, acetaminophen, Ibuprofen and physical therapy    Patient states that she would like to see a Neurosurgeon at the [facility], she feels she isn't progressing as quickly as she should.  She knows she needs to quit smoking and that is affecting her healing, but she has cut back significantly.  She is willing to work harder on quitting.  She does also wonder if her knee pain and hip pain is contributing to her back symptoms, or if her back problems are causing knee and hip pain.  She is willing to visit with an Orthopedic surgeon to see what they think.        Patient Active Problem List   Diagnosis     Acquired hypothyroidism     Anxiety     History of tachycardia     Obstructive sleep apnea     Tobacco dependence     Spina bifida with hydrocephalus (H)     history of brain aneurism     Tension-type headache, not intractable     history of Noncompliance with medication regimen     Osteoarthritis of spine with radiculopathy, lumbar region     Lumbosacral radiculopathy at S1     Spinal stenosis of lumbar region     Moderate persistent asthma without complication     Morbid obesity (H)     Chronic bilateral low back pain with sciatica     Past Surgical  History:   Procedure Laterality Date     BACK SURGERY  08/29/2017    [facility]- L4-L5 laminectomy and decompression L5-S1 transforaminal lumbar fusion, L5-S! diskectomy, L4-S! 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,nelida type 1 syndrome,       Social History     Tobacco Use     Smoking status: Current Every Day Smoker     Packs/day: 1.00     Years: 24.00     Pack years: 24.00     Types: Cigarettes     Smokeless tobacco: Never Used     Tobacco comment: Tried to Quit (NO); Passive Exposure (NO)   Substance Use Topics     Alcohol use: Yes     Comment: 3 Beers - WEEKLY     Family History   Problem Relation Age of Onset     Other - See Comments Father 54        Accidental Head Injury     Alzheimer Disease Other      Diabetes Other      Cancer Other         LUNG     Respiratory Other         Emphysema     Cancer Other         LUNG         Current Outpatient Medications   Medication Sig Dispense Refill     albuterol (PROAIR HFA) 108 (90 Base) MCG/ACT inhaler Inhale 2 puffs into the lungs 4 times daily 17 g 1     ALPRAZolam (XANAX) 0.25 MG tablet TAKE 1 TABLET BY MOUTH THREE TIMES DAILY AS NEEDED FOR ANXIETY 90 tablet 5     buPROPion (WELLBUTRIN XL) 150 MG 24 hr tablet Take 1 tablet (150 mg) by mouth every morning 30 tablet 2     clindamycin (CLEOCIN T) 1 % lotion APPLY EXTERNALLY TO THE AFFECTED AREA TWICE DAILY 60 mL 0     cyclobenzaprine (FLEXERIL) 10 MG tablet TAKE 1 TABLET(10 MG) BY MOUTH TWICE DAILY AS NEEDED FOR MUSCLE SPASMS 60 tablet 3     diltiazem ER COATED BEADS (MATZIM LA) 240 MG 24 hr tablet Take 1 tablet (240 mg) by mouth daily 30 tablet 5     ibuprofen (IBU) 600 MG tablet Take 1 tablet (600 mg) by mouth every 8 hours as needed for moderate pain 90 tablet 1     levothyroxine (SYNTHROID/LEVOTHROID) 112 MCG tablet Take 1 tablet (112 mcg) by mouth daily 30 tablet 0  "    nicotine polacrilex (NICORETTE) 4 MG gum Take 1 each (4 mg) by mouth as needed for smoking cessation 100 each 2     No Known Allergies      Reviewed and updated as needed this visit by Provider         Review of Systems   ROS COMP: Constitutional, HEENT, cardiovascular, pulmonary, gi and gu systems are negative, except as otherwise noted.      Objective    /70 (BP Location: Left arm, Patient Position: Sitting, Cuff Size: Adult Regular)   Pulse 78   Temp 97.3  F (36.3  C) (Tympanic)   Resp 16   Ht 1.651 m (5' 5\")   Wt 127.5 kg (281 lb)   SpO2 99%   BMI 46.76 kg/m    Body mass index is 46.76 kg/m .  Physical Exam   GENERAL: alert and no distress  MS: no gross musculoskeletal defects noted, no edema  PSYCH: mentation appears normal, affect normal/bright    Diagnostic Test Results:  See orders        Assessment & Plan     1. Chronic bilateral low back pain with bilateral sciatica  Referral ordered, notes available in Epic.  Follow-up here as directed.  Disability forms completed  - NEUROSURGERY REFERRAL    2. Osteoarthritis of spine with radiculopathy, lumbar region  As above  - NEUROSURGERY REFERRAL    3. Lumbosacral radiculopathy at S1  As above.  - NEUROSURGERY REFERRAL    4. Chronic pain of both knees  Referral ordered.  - ORTHOPEDICS ADULT REFERRAL    5. Bilateral hip pain  Referral ordered.  - ORTHOPEDICS ADULT REFERRAL    6. Hypothyroidism, unspecified type  Labs updated.  - TSH with free T4 reflex    7. Tobacco abuse  Refilled to work on smoking cessation  - buPROPion (WELLBUTRIN XL) 150 MG 24 hr tablet; Take 1 tablet (150 mg) by mouth every morning  Dispense: 30 tablet; Refill: 2    8. Need for vaccination  Updated  - TD PRSERV FREE >=7 YRS ADS IM [51130]  - 1st  Administration  [30627]     Tobacco Cessation:   reports that she has been smoking cigarettes. She has a 24.00 pack-year smoking history. She has never used smokeless tobacco.  Tobacco Cessation Action Plan: Pharmacotherapies : " "Zyban/Wellbutrin      BMI:   Estimated body mass index is 46.76 kg/m  as calculated from the following:    Height as of this encounter: 1.651 m (5' 5\").    Weight as of this encounter: 127.5 kg (281 lb).       Over 50 minutes are spent with the patient, over 40 minutes of which was in education and counseling regarding current conditions and treatment/therapy options/risks/benefits/etc, including reviewing current symptoms, completion of paper work, referral appointment discussion, and future planning.        Return in about 3 months (around 2/4/2020) for Follow-up.    Paz Maria MD  [facility]      "

## 2019-11-04 ENCOUNTER — OFFICE VISIT (OUTPATIENT)
Dept: FAMILY MEDICINE | Facility: OTHER | Age: 49
End: 2019-11-04
Attending: FAMILY MEDICINE
Payer: COMMERCIAL

## 2019-11-04 ENCOUNTER — HEALTH MAINTENANCE LETTER (OUTPATIENT)
Age: 49
End: 2019-11-04

## 2019-11-04 VITALS
DIASTOLIC BLOOD PRESSURE: 70 MMHG | SYSTOLIC BLOOD PRESSURE: 132 MMHG | HEART RATE: 78 BPM | TEMPERATURE: 97.3 F | WEIGHT: 281 LBS | HEIGHT: 65 IN | OXYGEN SATURATION: 99 % | RESPIRATION RATE: 16 BRPM | BODY MASS INDEX: 46.82 KG/M2

## 2019-11-04 DIAGNOSIS — M54.17 LUMBOSACRAL RADICULOPATHY AT S1: ICD-10-CM

## 2019-11-04 DIAGNOSIS — M47.26 OSTEOARTHRITIS OF SPINE WITH RADICULOPATHY, LUMBAR REGION: ICD-10-CM

## 2019-11-04 DIAGNOSIS — G89.29 CHRONIC PAIN OF BOTH KNEES: ICD-10-CM

## 2019-11-04 DIAGNOSIS — M25.551 BILATERAL HIP PAIN: ICD-10-CM

## 2019-11-04 DIAGNOSIS — M25.561 CHRONIC PAIN OF BOTH KNEES: ICD-10-CM

## 2019-11-04 DIAGNOSIS — M54.42 CHRONIC BILATERAL LOW BACK PAIN WITH BILATERAL SCIATICA: Primary | ICD-10-CM

## 2019-11-04 DIAGNOSIS — M25.552 BILATERAL HIP PAIN: ICD-10-CM

## 2019-11-04 DIAGNOSIS — Z23 NEED FOR VACCINATION: ICD-10-CM

## 2019-11-04 DIAGNOSIS — E03.9 HYPOTHYROIDISM, UNSPECIFIED TYPE: ICD-10-CM

## 2019-11-04 DIAGNOSIS — Z72.0 TOBACCO ABUSE: ICD-10-CM

## 2019-11-04 DIAGNOSIS — M54.41 CHRONIC BILATERAL LOW BACK PAIN WITH BILATERAL SCIATICA: Primary | ICD-10-CM

## 2019-11-04 DIAGNOSIS — G89.29 CHRONIC BILATERAL LOW BACK PAIN WITH BILATERAL SCIATICA: Primary | ICD-10-CM

## 2019-11-04 DIAGNOSIS — M25.562 CHRONIC PAIN OF BOTH KNEES: ICD-10-CM

## 2019-11-04 PROCEDURE — 36415 COLL VENOUS BLD VENIPUNCTURE: CPT | Performed by: FAMILY MEDICINE

## 2019-11-04 PROCEDURE — 90471 IMMUNIZATION ADMIN: CPT | Performed by: FAMILY MEDICINE

## 2019-11-04 PROCEDURE — 84443 ASSAY THYROID STIM HORMONE: CPT | Performed by: FAMILY MEDICINE

## 2019-11-04 PROCEDURE — 99215 OFFICE O/P EST HI 40 MIN: CPT | Mod: 25 | Performed by: FAMILY MEDICINE

## 2019-11-04 PROCEDURE — 90714 TD VACC NO PRESV 7 YRS+ IM: CPT | Performed by: FAMILY MEDICINE

## 2019-11-04 RX ORDER — BUPROPION HYDROCHLORIDE 150 MG/1
150 TABLET ORAL EVERY MORNING
Qty: 30 TABLET | Refills: 2 | Status: SHIPPED | OUTPATIENT
Start: 2019-11-04 | End: 2023-02-13

## 2019-11-04 ASSESSMENT — PAIN SCALES - GENERAL: PAINLEVEL: SEVERE PAIN (6)

## 2019-11-04 ASSESSMENT — MIFFLIN-ST. JEOR: SCORE: 1895.49

## 2019-11-04 NOTE — LETTER
My Asthma Action Plan    Name: Selma Dubose   YOB: 1970  Date: 11/4/2019   My doctor: Paz Maria MD   My clinic: [facility]        My Rescue Medicine:   Albuterol inhaler (Proair/Ventolin/Proventil HFA)  2-4 puffs EVERY 4 HOURS as needed. Use a spacer if recommended by your provider.   My Asthma Severity:   Intermittent / Exercise Induced  Know your asthma triggers: pollens, mold, strong odors and fumes and exercise or sports  None          GREEN ZONE   Good Control    I feel good    No cough or wheeze    Can work, sleep and play without asthma symptoms       Take your asthma control medicine every day.     1. If exercise triggers your asthma, take your rescue medication    15 minutes before exercise or sports, and    During exercise if you have asthma symptoms  2. Spacer to use with inhaler: If you have a spacer, make sure to use it with your inhaler             YELLOW ZONE Getting Worse  I have ANY of these:    I do not feel good    Cough or wheeze    Chest feels tight    Wake up at night   1. Keep taking your Green Zone medications  2. Start taking your rescue medicine:    every 20 minutes for up to 1 hour. Then every 4 hours for 24-48 hours.  3. If you stay in the Yellow Zone for more than 12-24 hours, contact your doctor.  4. If you do not return to the Green Zone in 12-24 hours or you get worse, start taking your oral steroid medicine if prescribed by your provider.           RED ZONE Medical Alert - Get Help  I have ANY of these:    I feel awful    Medicine is not helping    Breathing getting harder    Trouble walking or talking    Nose opens wide to breathe       1. Take your rescue medicine NOW  2. If your provider has prescribed an oral steroid medicine, start taking it NOW  3. Call your doctor NOW  4. If you are still in the Red Zone after 20 minutes and you have not reached your doctor:    Take your rescue medicine again and    Call 911 or go to the  emergency room right away    See your regular doctor within 2 weeks of an Emergency Room or Urgent Care visit for follow-up treatment.          Annual Reminders:  Meet with Asthma Educator,  Flu Shot in the Fall, consider Pneumonia Vaccination for patients with asthma (aged 19 and older).    Pharmacy:    [id number] [address] AT [facility] [address]  [facility] - [address]  [facility] - [address]                        Asthma Triggers  How To Control Things That Make Your Asthma Worse    Triggers are things that make your asthma worse.  Look at the list below to help you find your triggers and   what you can do about them. You can help prevent asthma flare-ups by staying away from your triggers.      Trigger                                                          What you can do   Cigarette Smoke  Tobacco smoke can make asthma worse. Do not allow smoking in your home, car or around you.  Be sure no one smokes at a child s day care or school.  If you smoke, ask your health care provider for ways to help you quit.  Ask family members to quit too.  Ask your health care provider for a referral to Quit Plan to help you quit smoking, or call 7-678-901-PLAN.     Colds, Flu, Bronchitis  These are common triggers of asthma. Wash your hands often.  Don t touch your eyes, nose or mouth.  Get a flu shot every year.     Dust Mites  These are tiny bugs that live in cloth or carpet. They are too small to see. Wash sheets and blankets in hot water every week.   Encase pillows and mattress in dust mite proof covers.  Avoid having carpet if you can. If you have carpet, vacuum weekly.   Use a dust mask and HEPA vacuum.   Pollen and Outdoor Mold  Some people are allergic to trees, grass, or weed pollen, or molds. Try to keep your windows closed.  Limit time out doors when pollen count is high.    Ask you health care provider about taking medicine during allergy season.     Animal Dander  Some people are allergic to skin flakes, urine or saliva from pets with fur or feathers. Keep pets with fur or feathers out of your home.    If you can t keep the pet outdoors, then keep the pet out of your bedroom.  Keep the bedroom door closed.  Keep pets off cloth furniture and away from stuffed toys.     Mice, Rats, and Cockroaches  Some people are allergic to the waste from these pests.   Cover food and garbage.  Clean up spills and food crumbs.  Store grease in the refrigerator.   Keep food out of the bedroom.   Indoor Mold  This can be a trigger if your home has high moisture. Fix leaking faucets, pipes, or other sources of water.   Clean moldy surfaces.  Dehumidify basement if it is damp and smelly.   Smoke, Strong Odors, and Sprays  These can reduce air quality. Stay away from strong odors and sprays, such as perfume, powder, hair spray, paints, smoke incense, paint, cleaning products, candles and new carpet.   Exercise or Sports  Some people with asthma have this trigger. Be active!  Ask your doctor about taking medicine before sports or exercise to prevent symptoms.    Warm up for 5-10 minutes before and after sports or exercise.     Other Triggers of Asthma  Cold air:  Cover your nose and mouth with a scarf.  Sometimes laughing or crying can be a trigger.  Some medicines and food can trigger asthma.

## 2019-11-04 NOTE — NURSING NOTE
"Chief Complaint   Patient presents with     Musculoskeletal Problem       Initial /70 (BP Location: Left arm, Patient Position: Sitting, Cuff Size: Adult Regular)   Pulse 78   Temp 97.3  F (36.3  C) (Tympanic)   Resp 16   Ht 1.651 m (5' 5\")   Wt 127.5 kg (281 lb)   SpO2 99%   BMI 46.76 kg/m   Estimated body mass index is 46.76 kg/m  as calculated from the following:    Height as of this encounter: 1.651 m (5' 5\").    Weight as of this encounter: 127.5 kg (281 lb).  Medication Reconciliation: complete  Nadeen Hernández MA  "

## 2019-11-05 LAB — TSH SERPL DL<=0.005 MIU/L-ACNC: 2.38 MU/L (ref 0.4–4)

## 2019-11-05 ASSESSMENT — ASTHMA QUESTIONNAIRES: ACT_TOTALSCORE: 21

## 2019-12-20 ENCOUNTER — MYC MEDICAL ADVICE (OUTPATIENT)
Dept: FAMILY MEDICINE | Facility: OTHER | Age: 49
End: 2019-12-20

## 2019-12-23 ENCOUNTER — MEDICAL CORRESPONDENCE (OUTPATIENT)
Dept: HEALTH INFORMATION MANAGEMENT | Facility: CLINIC | Age: 49
End: 2019-12-23

## 2019-12-23 NOTE — TELEPHONE ENCOUNTER
On the job accommodation form completed.      I did not receive form to complete for intermittent absences.      As far as the form from the , I have to get approval for completion and billing, but we will work on it.    Can [facility] also check on referral ordered for Neurosurgery?  We placed the order 11/4/19, information was sent, but patient has not heard from [facility].  Thank you.    I would not recommend ordering another MRI at this time, unless Neurosurgery wants it before appointment, but if she is going to be seen at [facility], they will likely want their own MRI.

## 2019-12-30 NOTE — TELEPHONE ENCOUNTER
Left message for pt to  a copy of the job accomodation form and that we did not get the other form as of yet.      [facility]-Can your please check on the referral.

## 2019-12-30 NOTE — TELEPHONE ENCOUNTER
LVM for patient to call back.      I spoke with Lokesh at [facility].  He stated that after several call attempts and a letter sent to patient, they did not get a response from patient so appointment/referral was canceled.  Did say that if patient calls them back they will reopen it.

## 2020-01-01 ENCOUNTER — MYC MEDICAL ADVICE (OUTPATIENT)
Dept: FAMILY MEDICINE | Facility: OTHER | Age: 50
End: 2020-01-01

## 2020-01-01 NOTE — LETTER
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  I am board certified in Family Medicine.  I have been asked by my patient, Ms. Selma Dubose, to provide this statement to be used in conjunction with her request for disability insurance benefits.  This statement is intended for the foregoing purpose and this statement is based upon my medical education and experience and is based upon my specific knowledge of my patient's medical problems and treatment history.  This statement is only intended for my patient's pursuit of her private disability insurance and not for Social Security disability, Workers' Compensations, or any other issues.    I refer the reader to my medical records for the specifics of my patient's medical problems and treatment history.  She has also seen specialists in regards to her conditions and has had surgery.  I would defer any comments on her specialist or surgical consultations to those specialists.  I can only comment on my experiences meeting with Ms. Dubose.    Ms. Dubose has chronic low back pain with sciatica, spinal stenosis, and lumbosacral radiculopathy in the S1 distribution.  She did have spinal surgery on 8/22/2017 at [facility].  She continues to have low back pain unrelieved by therapy or medications.    Ms. Dubose has had follow-up scans and specialists appointments.  She was told she had not healed well from surgery and did have a bone stimulator implanted.  She continues to have pain.    Ms. Dubose has been given pain medication and muscle relaxers to help with pain.  She is no longer using pain medication due to side effects, but she still has a muscle relaxer to use when needed.   This can cause sedation, so it cannot be used while working.    Ms. Dubose could probably remain at her current position and employment, but on a part time basis at best.  She does require frequent breaks in order to change positions.  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  She does need time away from her computer and her phone when pain flares.  She also cannot stand for more than 10 minutes or so at a time without developing pain.  If part time employment would not allow for frequent breaks, frequent position changes, and time away from work with severe symptoms, I do not think she can return to meaningful employment.      Please contact me for questions or concerns.      Sincerely,        Paz Maria MD

## 2020-01-19 DIAGNOSIS — M47.26 OSTEOARTHRITIS OF SPINE WITH RADICULOPATHY, LUMBAR REGION: ICD-10-CM

## 2020-01-19 DIAGNOSIS — M54.42 MIDLINE LOW BACK PAIN WITH LEFT-SIDED SCIATICA, UNSPECIFIED CHRONICITY: ICD-10-CM

## 2020-01-19 DIAGNOSIS — M54.17 LUMBOSACRAL RADICULOPATHY AT S1: ICD-10-CM

## 2020-01-20 NOTE — TELEPHONE ENCOUNTER
flexeril      Last Written Prescription Date:  1/10/19  Last Fill Quantity: 60,   # refills: 3  Last Office Visit: 11/4/19  Future Office visit:

## 2020-01-21 RX ORDER — CYCLOBENZAPRINE HCL 10 MG
TABLET ORAL
Qty: 60 TABLET | Refills: 3 | Status: SHIPPED | OUTPATIENT
Start: 2020-01-21 | End: 2021-01-12

## 2020-02-27 ENCOUNTER — MYC REFILL (OUTPATIENT)
Dept: FAMILY MEDICINE | Facility: OTHER | Age: 50
End: 2020-02-27

## 2020-02-27 DIAGNOSIS — F41.9 ANXIETY: ICD-10-CM

## 2020-02-27 DIAGNOSIS — E03.9 HYPOTHYROIDISM, UNSPECIFIED TYPE: ICD-10-CM

## 2020-02-28 DIAGNOSIS — E03.9 HYPOTHYROIDISM, UNSPECIFIED TYPE: ICD-10-CM

## 2020-02-28 RX ORDER — LEVOTHYROXINE SODIUM 112 UG/1
TABLET ORAL
Qty: 30 TABLET | Refills: 0 | OUTPATIENT
Start: 2020-02-28

## 2020-02-28 RX ORDER — ALPRAZOLAM 0.25 MG
0.25 TABLET ORAL 3 TIMES DAILY PRN
Qty: 90 TABLET | Refills: 5 | Status: SHIPPED | OUTPATIENT
Start: 2020-02-28 | End: 2020-08-05

## 2020-02-28 RX ORDER — LEVOTHYROXINE SODIUM 112 UG/1
112 TABLET ORAL DAILY
Qty: 30 TABLET | Refills: 3 | Status: SHIPPED | OUTPATIENT
Start: 2020-02-28 | End: 2020-08-12

## 2020-02-28 NOTE — TELEPHONE ENCOUNTER
Xanax       Last Written Prescription Date:  9/27/19  Last Fill Quantity: 90,   # refills: 5  Last Office Visit: 11/4/19  Future Office visit:       Routing refill request to provider for review/approval because:  Drug not on the [facility], P or [facility] refill protocol or controlled substance

## 2020-04-13 DIAGNOSIS — Z87.898 HISTORY OF TACHYCARDIA: Primary | ICD-10-CM

## 2020-04-14 RX ORDER — DILTIAZEM HYDROCHLORIDE 240 MG/1
CAPSULE, EXTENDED RELEASE ORAL
Qty: 30 CAPSULE | Refills: 2 | Status: SHIPPED | OUTPATIENT
Start: 2020-04-14 | End: 2020-07-09

## 2020-04-14 NOTE — TELEPHONE ENCOUNTER
diltiazem      Last Written Prescription Date:  10/1/2019  Last Fill Quantity: 30,   # refills: 5  Last Office Visit: 11/4/2019  Future Office visit:       Routing refill request to provider for review/approval because:  Medication is reported/historical  Lab Results   Component Value Date    ALT 62 08/14/2017        Lab Results   Component Value Date    WBC 8.8 04/08/2019     Lab Results   Component Value Date    RBC 4.42 04/08/2019     Lab Results   Component Value Date    HGB 14.7 04/08/2019     Lab Results   Component Value Date    HCT 43.0 04/08/2019     No components found for: MCT  Lab Results   Component Value Date    MCV 97 04/08/2019     Lab Results   Component Value Date    MCH 33.3 04/08/2019     Lab Results   Component Value Date    MCHC 34.2 04/08/2019     Lab Results   Component Value Date    RDW 14.0 04/08/2019     Lab Results   Component Value Date     04/08/2019

## 2020-04-22 DIAGNOSIS — R06.02 SOB (SHORTNESS OF BREATH): ICD-10-CM

## 2020-04-22 RX ORDER — ALBUTEROL SULFATE 90 UG/1
AEROSOL, METERED RESPIRATORY (INHALATION)
Qty: 17 G | Refills: 0 | Status: SHIPPED | OUTPATIENT
Start: 2020-04-22 | End: 2021-05-28

## 2020-06-17 ENCOUNTER — MYC MEDICAL ADVICE (OUTPATIENT)
Dept: FAMILY MEDICINE | Facility: OTHER | Age: 50
End: 2020-06-17

## 2020-06-18 NOTE — TELEPHONE ENCOUNTER
Faxed as requested.   Well child hearing and vision screening    Child is less than age 3 and so hearing and vision were not formally tested.      November Paw, RMA

## 2020-06-18 NOTE — TELEPHONE ENCOUNTER
Forms received through fax and completed.  Patient needs to schedule appointment to discuss possible MRI, as she has not been seen since November

## 2020-07-09 DIAGNOSIS — Z87.898 HISTORY OF TACHYCARDIA: ICD-10-CM

## 2020-07-09 NOTE — TELEPHONE ENCOUNTER
Diltiazem       Last Written Prescription Date:  4/14/2020  Last Fill Quantity: 30,   # refills: 2  Last Office Visit: 11/04/2019  Future Office visit:

## 2020-07-11 DIAGNOSIS — Z87.898 HISTORY OF TACHYCARDIA: ICD-10-CM

## 2020-07-14 RX ORDER — DILTIAZEM HYDROCHLORIDE 240 MG/1
240 CAPSULE, EXTENDED RELEASE ORAL DAILY
Qty: 30 CAPSULE | Refills: 0 | Status: SHIPPED | OUTPATIENT
Start: 2020-07-14 | End: 2020-07-17

## 2020-07-14 RX ORDER — DILTIAZEM HYDROCHLORIDE 240 MG/1
CAPSULE, EXTENDED RELEASE ORAL
Qty: 30 CAPSULE | Refills: 2 | OUTPATIENT
Start: 2020-07-14

## 2020-07-14 NOTE — TELEPHONE ENCOUNTER
diltiazem       Last Written Prescription Date:  7/14/20  Last Fill Quantity: 30,   # refills: 0  Last Office Visit: 11/4/19  Future Office visit:

## 2020-07-16 ENCOUNTER — MYC REFILL (OUTPATIENT)
Dept: FAMILY MEDICINE | Facility: OTHER | Age: 50
End: 2020-07-16

## 2020-07-16 DIAGNOSIS — Z87.898 HISTORY OF TACHYCARDIA: ICD-10-CM

## 2020-07-16 RX ORDER — DILTIAZEM HYDROCHLORIDE 240 MG/1
240 CAPSULE, EXTENDED RELEASE ORAL DAILY
Qty: 30 CAPSULE | Refills: 0 | OUTPATIENT
Start: 2020-07-16

## 2020-07-17 ENCOUNTER — MYC REFILL (OUTPATIENT)
Dept: FAMILY MEDICINE | Facility: OTHER | Age: 50
End: 2020-07-17

## 2020-07-17 DIAGNOSIS — Z87.898 HISTORY OF TACHYCARDIA: ICD-10-CM

## 2020-07-17 RX ORDER — DILTIAZEM HYDROCHLORIDE 240 MG/1
240 CAPSULE, EXTENDED RELEASE ORAL DAILY
Qty: 30 CAPSULE | Refills: 0 | Status: SHIPPED | OUTPATIENT
Start: 2020-07-17 | End: 2020-08-12

## 2020-07-17 NOTE — TELEPHONE ENCOUNTER
Tiazac  Last Written Prescription Date: 7/14/20  Last Fill Quantity: 30 # of Refills: 0  Last Office Visit: 11/4/19

## 2020-07-17 NOTE — TELEPHONE ENCOUNTER
Failed protocol due to    Normal ALT in past 12 months Protocol Details    Normal serum creatinine on file in past 12 months      Please advise

## 2020-08-04 DIAGNOSIS — F41.9 ANXIETY: ICD-10-CM

## 2020-08-05 RX ORDER — ALPRAZOLAM 0.25 MG
TABLET ORAL
Qty: 90 TABLET | Refills: 2 | Status: SHIPPED | OUTPATIENT
Start: 2020-08-05 | End: 2020-10-22

## 2020-08-05 NOTE — TELEPHONE ENCOUNTER
alprazolam      Last Written Prescription Date:  2/28/20  Last Fill Quantity: 90,   # refills: 5  Last Office Visit: 11/4/19  Future Office visit:       Routing refill request to provider for review/approval because:

## 2020-08-12 ENCOUNTER — MYC REFILL (OUTPATIENT)
Dept: FAMILY MEDICINE | Facility: OTHER | Age: 50
End: 2020-08-12

## 2020-08-12 DIAGNOSIS — Z87.898 HISTORY OF TACHYCARDIA: ICD-10-CM

## 2020-08-12 DIAGNOSIS — E03.9 HYPOTHYROIDISM, UNSPECIFIED TYPE: ICD-10-CM

## 2020-08-13 RX ORDER — LEVOTHYROXINE SODIUM 112 UG/1
112 TABLET ORAL DAILY
Qty: 30 TABLET | Refills: 0 | Status: SHIPPED | OUTPATIENT
Start: 2020-08-13 | End: 2020-09-10

## 2020-08-13 RX ORDER — DILTIAZEM HYDROCHLORIDE 240 MG/1
240 CAPSULE, EXTENDED RELEASE ORAL DAILY
Qty: 30 CAPSULE | Refills: 0 | Status: SHIPPED | OUTPATIENT
Start: 2020-08-13 | End: 2020-08-17

## 2020-08-13 NOTE — TELEPHONE ENCOUNTER
diltiazem      Last Written Prescription Date:  7/17/20  Last Fill Quantity: 30,   # refills: 0  Last Office Visit: 11/4/19  Future Office visit:       Routing refill request to provider for review/approval because:    levothyroxne      Last Written Prescription Date:  2/28/20  Last Fill Quantity: 30,   # refills: 3  Last Office Visit: 11/4/19  Future Office visit:       Routing refill request to provider for review/approval because:

## 2020-08-17 ENCOUNTER — MYC REFILL (OUTPATIENT)
Dept: FAMILY MEDICINE | Facility: OTHER | Age: 50
End: 2020-08-17

## 2020-08-17 DIAGNOSIS — Z87.898 HISTORY OF TACHYCARDIA: ICD-10-CM

## 2020-08-18 NOTE — TELEPHONE ENCOUNTER
diltiazem     Last Written Prescription Date:  8/13/20  Last Fill Quantity: 30,   # refills: 0  Last Office Visit: 11/4/19  Future Office visit:       Routing refill request to provider for review/approval because:  Drug not on the [facility], P or [facility] refill protocol or controlled substance

## 2020-08-18 NOTE — PROGRESS NOTES
"Selma Dubose is a 49 year old female who is being evaluated via a billable telephone visit.      The patient has been notified of following:     \"This telephone visit will be conducted via a call between you and your physician/provider. We have found that certain health care needs can be provided without the need for a physical exam.  This service lets us provide the care you need with a short phone conversation.  If a prescription is necessary we can send it directly to your pharmacy.  If lab work is needed we can place an order for that and you can then stop by our lab to have the test done at a later time.    Telephone visits are billed at different rates depending on your insurance coverage. During this emergency period, for some insurers they may be billed the same as an in-person visit.  Please reach out to your insurance provider with any questions.    If during the course of the call the physician/provider feels a telephone visit is not appropriate, you will not be charged for this service.\"    Patient has given verbal consent for Telephone visit?  Yes    What phone number would you like to be contacted at? [phone]    How would you like to obtain your AVS? MyChart    Subjective     Selma Dubose is a 49 year old female who presents via phone visit today for the following health issues:    HPI    Hypothyroidism Follow-up      Since last visit, patient describes the following symptoms: Weight stable, no hair loss, no skin changes, no constipation, no loose stools    Chronic Pain Follow-Up    Where in your body do you have pain? Low back radiates to both legs. Left foot wouldn't work  How has your pain affected your ability to work? Still working but only able to work 20 hours a week  Which of these pain treatments have you tried since your last clinic visit? Chiropractor, Cold, Heat and Massage  How well are you sleeping? Poor  How has your mood been since your last visit? About the same  Have you " had a significant life event? Job Concerns  Other aggravating factors: prolonged sitting and prolonged standing  Taking medication as directed? Yes  Patient states her pain is worse now than it was before surgery.  She wonders if something is wrong and she asks for a MRI.  She is still trying to figure out what to do about disability, and thinks she will be applying for short term disability.    PHQ-9 SCORE 1/8/2018 8/21/2018 4/8/2019   PHQ-9 Total Score - - -   PHQ-9 Total Score 5 8 8     LIZETT-7 SCORE 7/31/2018 8/21/2018 4/8/2019   Total Score 4 7 6     No flowsheet data found.  Encounter-Level CSA:    There are no encounter-level csa.     Patient-Level CSA:    There are no patient-level csa.         How many servings of fruits and vegetables do you eat daily?  2-3    On average, how many sweetened beverages do you drink each day (Examples: soda, juice, sweet tea, etc.  Do NOT count diet or artificially sweetened beverages)?   0    How many days per week do you exercise enough to make your heart beat faster? none    How many minutes a day do you exercise enough to make your heart beat faster?     How many days per week do you miss taking your medication? 0         Patient Active Problem List   Diagnosis     Acquired hypothyroidism     Anxiety     History of tachycardia     Obstructive sleep apnea     Tobacco dependence     Spina bifida with hydrocephalus (H)     history of brain aneurism     Tension-type headache, not intractable     history of Noncompliance with medication regimen     Osteoarthritis of spine with radiculopathy, lumbar region     Lumbosacral radiculopathy at S1     Spinal stenosis of lumbar region     Moderate persistent asthma without complication     Morbid obesity (H)     Chronic bilateral low back pain with sciatica     Past Surgical History:   Procedure Laterality Date     BACK SURGERY  08/29/2017    [facility]- L4-L5 laminectomy and decompression L5-S1 transforaminal lumbar fusion, L5-S!  diskectomy, L4-S! 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,nelida type 1 syndrome,       Social History     Tobacco Use     Smoking status: Current Every Day Smoker     Packs/day: 1.00     Years: 24.00     Pack years: 24.00     Types: Cigarettes     Smokeless tobacco: Never Used     Tobacco comment: Tried to Quit (NO); Passive Exposure (NO)   Substance Use Topics     Alcohol use: Yes     Comment: 3 Beers - WEEKLY     Family History   Problem Relation Age of Onset     Other - See Comments Father 54        Accidental Head Injury     Alzheimer Disease Other      Diabetes Other      Cancer Other         LUNG     Respiratory Other         Emphysema     Cancer Other         LUNG         Current Outpatient Medications   Medication Sig Dispense Refill     albuterol (PROAIR HFA/PROVENTIL HFA/VENTOLIN HFA) 108 (90 Base) MCG/ACT inhaler INHALE 2 PUFFS INTO THE LUNGS FOUR TIMES DAILY 17 g 0     ALPRAZolam (XANAX) 0.25 MG tablet TAKE 1 TABLET(0.25 MG) BY MOUTH THREE TIMES DAILY AS NEEDED FOR ANXIETY 90 tablet 2     buPROPion (WELLBUTRIN XL) 150 MG 24 hr tablet Take 1 tablet (150 mg) by mouth every morning 30 tablet 2     clindamycin (CLEOCIN T) 1 % external lotion APPLY EXTERNALLY TO THE AFFECTED AREA TWICE DAILY 60 mL 0     cyclobenzaprine (FLEXERIL) 10 MG tablet TAKE 1 TABLET(10 MG) BY MOUTH TWICE DAILY AS NEEDED FOR MUSCLE SPASMS 60 tablet 3     ibuprofen (IBU) 600 MG tablet Take 1 tablet (600 mg) by mouth every 8 hours as needed for moderate pain 90 tablet 1     levothyroxine (SYNTHROID/LEVOTHROID) 112 MCG tablet Take 1 tablet (112 mcg) by mouth daily 30 tablet 0     nicotine polacrilex (NICORETTE) 4 MG gum Take 1 each (4 mg) by mouth as needed for smoking cessation 100 each 2     diltiazem ER (TIAZAC) 240 MG 24 hr ER beaded capsule Take 1 capsule (240 mg) by mouth daily 90 capsule 3  "    No Known Allergies    Reviewed and updated as needed this visit by Provider  Tobacco  Allergies  Meds  Problems  Med Hx  Surg Hx  Fam Hx         Review of Systems   Constitutional, HEENT, cardiovascular, pulmonary, gi and gu systems are negative, except as otherwise noted.       Objective   Vitals - Patient Reported  Pain Score: Severe Pain (6)  Pain Loc: Low Back      Vitals:  No vitals were obtained today due to virtual visit.    PSYCH: Alert and oriented times 3; coherent speech, normal   rate and volume, able to articulate logical thoughts, able   to abstract reason, no tangential thoughts, no hallucinations   or delusions  Her affect is normal  RESP: No cough, no audible wheezing, able to talk in full sentences  Remainder of exam unable to be completed due to telephone visits    Diagnostic Test Results:  none           Assessment & Plan     Acquired hypothyroidism  Lab orders placed, will coordinate appointment in [place] with MRI.  Follow-up with results when available.  - TSH with free T4 reflex; Future  - Lipid Profile (Chol, Trig, HDL, LDL calc); Future  - Glucose; Future    Chronic bilateral low back pain with bilateral sciatica  Will order MRI - last completed 3/2019  - MR Lumbar Spine w/o & w Contrast; Future    Lumbosacral radiculopathy at S1  - MR Lumbar Spine w/o & w Contrast; Future    Spinal stenosis of lumbar region, unspecified whether neurogenic claudication present  - MR Lumbar Spine w/o & w Contrast; Future     BMI:   Estimated body mass index is 46.76 kg/m  as calculated from the following:    Height as of this encounter: 1.651 m (5' 5\").    Weight as of this encounter: 127.5 kg (281 lb).         Return if symptoms worsen or fail to improve.    Paz Maria MD  [facility]    Phone call duration:  8 minutes                "

## 2020-08-19 ENCOUNTER — VIRTUAL VISIT (OUTPATIENT)
Dept: FAMILY MEDICINE | Facility: OTHER | Age: 50
End: 2020-08-19
Attending: FAMILY MEDICINE
Payer: COMMERCIAL

## 2020-08-19 VITALS — HEIGHT: 65 IN | BODY MASS INDEX: 46.82 KG/M2 | WEIGHT: 281 LBS

## 2020-08-19 DIAGNOSIS — M54.17 LUMBOSACRAL RADICULOPATHY AT S1: ICD-10-CM

## 2020-08-19 DIAGNOSIS — M48.061 SPINAL STENOSIS OF LUMBAR REGION, UNSPECIFIED WHETHER NEUROGENIC CLAUDICATION PRESENT: ICD-10-CM

## 2020-08-19 DIAGNOSIS — M54.41 CHRONIC BILATERAL LOW BACK PAIN WITH BILATERAL SCIATICA: ICD-10-CM

## 2020-08-19 DIAGNOSIS — G89.29 CHRONIC BILATERAL LOW BACK PAIN WITH BILATERAL SCIATICA: ICD-10-CM

## 2020-08-19 DIAGNOSIS — M54.42 CHRONIC BILATERAL LOW BACK PAIN WITH BILATERAL SCIATICA: ICD-10-CM

## 2020-08-19 DIAGNOSIS — E03.9 ACQUIRED HYPOTHYROIDISM: Primary | ICD-10-CM

## 2020-08-19 PROCEDURE — 99213 OFFICE O/P EST LOW 20 MIN: CPT | Mod: 95 | Performed by: FAMILY MEDICINE

## 2020-08-19 RX ORDER — DILTIAZEM HYDROCHLORIDE 240 MG/1
240 CAPSULE, EXTENDED RELEASE ORAL DAILY
Qty: 90 CAPSULE | Refills: 3 | Status: SHIPPED | OUTPATIENT
Start: 2020-08-19 | End: 2021-09-03

## 2020-08-19 ASSESSMENT — MIFFLIN-ST. JEOR: SCORE: 1895.49

## 2020-08-19 ASSESSMENT — PAIN SCALES - GENERAL: PAINLEVEL: SEVERE PAIN (6)

## 2020-08-19 NOTE — NURSING NOTE
"Chief Complaint   Patient presents with     Thyroid Problem       Initial Ht 1.651 m (5' 5\")   Wt 127.5 kg (281 lb)   BMI 46.76 kg/m   Estimated body mass index is 46.76 kg/m  as calculated from the following:    Height as of this encounter: 1.651 m (5' 5\").    Weight as of this encounter: 127.5 kg (281 lb).  Medication Reconciliation: complete  Shayy Booker LPN    "

## 2020-08-28 ENCOUNTER — HOSPITAL ENCOUNTER (OUTPATIENT)
Dept: MRI IMAGING | Facility: HOSPITAL | Age: 50
End: 2020-08-28
Attending: FAMILY MEDICINE
Payer: COMMERCIAL

## 2020-08-28 DIAGNOSIS — M54.17 LUMBOSACRAL RADICULOPATHY AT S1: ICD-10-CM

## 2020-08-28 DIAGNOSIS — M54.41 CHRONIC BILATERAL LOW BACK PAIN WITH BILATERAL SCIATICA: ICD-10-CM

## 2020-08-28 DIAGNOSIS — M54.42 CHRONIC BILATERAL LOW BACK PAIN WITH BILATERAL SCIATICA: ICD-10-CM

## 2020-08-28 DIAGNOSIS — E03.9 ACQUIRED HYPOTHYROIDISM: ICD-10-CM

## 2020-08-28 DIAGNOSIS — G89.29 CHRONIC BILATERAL LOW BACK PAIN WITH BILATERAL SCIATICA: ICD-10-CM

## 2020-08-28 DIAGNOSIS — M48.061 SPINAL STENOSIS OF LUMBAR REGION, UNSPECIFIED WHETHER NEUROGENIC CLAUDICATION PRESENT: ICD-10-CM

## 2020-08-28 LAB
CHOLEST SERPL-MCNC: 170 MG/DL
GLUCOSE SERPL-MCNC: 115 MG/DL (ref 70–99)
HDLC SERPL-MCNC: 22 MG/DL
LDLC SERPL CALC-MCNC: 120 MG/DL
NONHDLC SERPL-MCNC: 148 MG/DL
TRIGL SERPL-MCNC: 139 MG/DL
TSH SERPL DL<=0.005 MIU/L-ACNC: 4 MU/L (ref 0.4–4)

## 2020-08-28 PROCEDURE — 80061 LIPID PANEL: CPT | Performed by: FAMILY MEDICINE

## 2020-08-28 PROCEDURE — 82947 ASSAY GLUCOSE BLOOD QUANT: CPT | Performed by: FAMILY MEDICINE

## 2020-08-28 PROCEDURE — 72148 MRI LUMBAR SPINE W/O DYE: CPT | Mod: TC

## 2020-08-28 PROCEDURE — 84443 ASSAY THYROID STIM HORMONE: CPT | Performed by: FAMILY MEDICINE

## 2020-08-31 DIAGNOSIS — E78.5 HYPERLIPIDEMIA, UNSPECIFIED HYPERLIPIDEMIA TYPE: Primary | ICD-10-CM

## 2020-08-31 RX ORDER — ATORVASTATIN CALCIUM 20 MG/1
20 TABLET, FILM COATED ORAL DAILY
Qty: 30 TABLET | Refills: 2 | Status: SHIPPED | OUTPATIENT
Start: 2020-08-31 | End: 2022-06-23

## 2020-09-09 DIAGNOSIS — E03.9 HYPOTHYROIDISM, UNSPECIFIED TYPE: ICD-10-CM

## 2020-09-10 RX ORDER — LEVOTHYROXINE SODIUM 112 UG/1
TABLET ORAL
Qty: 30 TABLET | Refills: 5 | Status: SHIPPED | OUTPATIENT
Start: 2020-09-10 | End: 2021-05-18

## 2020-09-16 NOTE — PROGRESS NOTES
Subjective     Selma uDbose is a 49 year old female who presents to clinic today for the following health issues:    HPI       Concern - MRI follow up  Onset: 8/28/20 mri completed  Description: follow-up MRI of lumbar-back pain  Intensity: worsening of significant chronic back pain  Progression of Symptoms:  worsening  Accompanying Signs & Symptoms: pain  Previous history of similar problem: hx back problems  Precipitating factors:        Worsened by: activity  Alleviating factors:        Improved by: nothing  Therapies tried and outcome: hx surgical intervention     Patient states symptoms are worsening overall.  She is having difficulty losing weight as she cannot be active due to pain.  She is still smoking.  She has looked into Neurosurgical options and she would like to see Dr. Perez through [facility].    Patient Active Problem List   Diagnosis     Acquired hypothyroidism     Anxiety     History of tachycardia     Obstructive sleep apnea     Tobacco dependence     Spina bifida with hydrocephalus (H)     history of brain aneurism     Tension-type headache, not intractable     history of Noncompliance with medication regimen     Osteoarthritis of spine with radiculopathy, lumbar region     Lumbosacral radiculopathy at S1     Spinal stenosis of lumbar region     Moderate persistent asthma without complication     Morbid obesity (H)     Chronic bilateral low back pain with sciatica     Hyperlipidemia, unspecified hyperlipidemia type     Past Surgical History:   Procedure Laterality Date     BACK SURGERY  08/29/2017    [facility]- L4-L5 laminectomy and decompression L5-S1 transforaminal lumbar fusion, L5-S! diskectomy, L4-S! 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,nelida type 1 syndrome,       Social History     Tobacco Use     Smoking status: Current Every  Day Smoker     Packs/day: 1.00     Years: 24.00     Pack years: 24.00     Types: Cigarettes     Smokeless tobacco: Never Used     Tobacco comment: Tried to Quit (NO); Passive Exposure (NO)   Substance Use Topics     Alcohol use: Yes     Comment: 3 Beers - WEEKLY     Family History   Problem Relation Age of Onset     Other - See Comments Father 54        Accidental Head Injury     Alzheimer Disease Other      Diabetes Other      Cancer Other         LUNG     Respiratory Other         Emphysema     Cancer Other         LUNG           Current Outpatient Medications   Medication Sig Dispense Refill     albuterol (PROAIR HFA/PROVENTIL HFA/VENTOLIN HFA) 108 (90 Base) MCG/ACT inhaler INHALE 2 PUFFS INTO THE LUNGS FOUR TIMES DAILY 17 g 0     ALPRAZolam (XANAX) 0.25 MG tablet TAKE 1 TABLET(0.25 MG) BY MOUTH THREE TIMES DAILY AS NEEDED FOR ANXIETY 90 tablet 2     atorvastatin (LIPITOR) 20 MG tablet Take 1 tablet (20 mg) by mouth daily 30 tablet 2     buPROPion (WELLBUTRIN XL) 150 MG 24 hr tablet Take 1 tablet (150 mg) by mouth every morning 30 tablet 2     clindamycin (CLEOCIN T) 1 % external lotion APPLY EXTERNALLY TO THE AFFECTED AREA TWICE DAILY 60 mL 0     cyclobenzaprine (FLEXERIL) 10 MG tablet TAKE 1 TABLET(10 MG) BY MOUTH TWICE DAILY AS NEEDED FOR MUSCLE SPASMS 60 tablet 3     diltiazem ER (TIAZAC) 240 MG 24 hr ER beaded capsule Take 1 capsule (240 mg) by mouth daily 90 capsule 3     ibuprofen (IBU) 600 MG tablet Take 1 tablet (600 mg) by mouth every 8 hours as needed for moderate pain 90 tablet 1     levothyroxine (SYNTHROID/LEVOTHROID) 112 MCG tablet TAKE 1 TABLET BY MOUTH DAILY 30 tablet 5     nicotine polacrilex (NICORETTE) 4 MG gum Take 1 each (4 mg) by mouth as needed for smoking cessation 100 each 2     No Known Allergies    Family History, Social History, Tobacco Use are all reviewed and updated      Review of Systems   Constitutional, HEENT, cardiovascular, pulmonary, gi and gu systems are negative, except as  "otherwise noted.      Objective    /74 (BP Location: Right arm, Patient Position: Chair, Cuff Size: Adult Large)   Pulse 99   Temp 97.6  F (36.4  C) (Tympanic)   Ht 1.651 m (5' 5\")   SpO2 96%   BMI 46.76 kg/m    Body mass index is 46.76 kg/m .  Physical Exam   GENERAL: alert and no distress  PSYCH: mentation appears normal, affect normal/bright          Assessment & Plan     1. Chronic bilateral low back pain with bilateral sciatica  Referral ordered as requested.  Follow-up as directed.  - ORTHOPEDIC ADULT REFERRAL; Future    2. Lumbosacral radiculopathy at S1  - ORTHOPEDIC ADULT REFERRAL; Future    3. Spinal stenosis of lumbar region, unspecified whether neurogenic claudication present  - ORTHOPEDIC ADULT REFERRAL; Future    4. S/P lumbar fusion  - ORTHOPEDIC ADULT REFERRAL; Future      BMI:   Estimated body mass index is 46.76 kg/m  as calculated from the following:    Height as of this encounter: 1.651 m (5' 5\").    Weight as of 8/19/20: 127.5 kg (281 lb).         Return if symptoms worsen or fail to improve.    Paz Maria MD  [facility]    "

## 2020-09-17 ENCOUNTER — OFFICE VISIT (OUTPATIENT)
Dept: FAMILY MEDICINE | Facility: OTHER | Age: 50
End: 2020-09-17
Attending: FAMILY MEDICINE
Payer: COMMERCIAL

## 2020-09-17 VITALS
BODY MASS INDEX: 46.76 KG/M2 | TEMPERATURE: 97.6 F | HEIGHT: 65 IN | DIASTOLIC BLOOD PRESSURE: 74 MMHG | OXYGEN SATURATION: 96 % | HEART RATE: 99 BPM | SYSTOLIC BLOOD PRESSURE: 122 MMHG

## 2020-09-17 DIAGNOSIS — M54.17 LUMBOSACRAL RADICULOPATHY AT S1: ICD-10-CM

## 2020-09-17 DIAGNOSIS — M48.061 SPINAL STENOSIS OF LUMBAR REGION, UNSPECIFIED WHETHER NEUROGENIC CLAUDICATION PRESENT: ICD-10-CM

## 2020-09-17 DIAGNOSIS — G89.29 CHRONIC BILATERAL LOW BACK PAIN WITH BILATERAL SCIATICA: Primary | ICD-10-CM

## 2020-09-17 DIAGNOSIS — Z98.1 S/P LUMBAR FUSION: ICD-10-CM

## 2020-09-17 DIAGNOSIS — M54.41 CHRONIC BILATERAL LOW BACK PAIN WITH BILATERAL SCIATICA: Primary | ICD-10-CM

## 2020-09-17 DIAGNOSIS — M54.42 CHRONIC BILATERAL LOW BACK PAIN WITH BILATERAL SCIATICA: Primary | ICD-10-CM

## 2020-09-17 PROCEDURE — 99213 OFFICE O/P EST LOW 20 MIN: CPT | Performed by: FAMILY MEDICINE

## 2020-09-17 ASSESSMENT — ASTHMA QUESTIONNAIRES
QUESTION_1 LAST FOUR WEEKS HOW MUCH OF THE TIME DID YOUR ASTHMA KEEP YOU FROM GETTING AS MUCH DONE AT WORK, SCHOOL OR AT HOME: NONE OF THE TIME
ACT_TOTALSCORE: 23
QUESTION_4 LAST FOUR WEEKS HOW OFTEN HAVE YOU USED YOUR RESCUE INHALER OR NEBULIZER MEDICATION (SUCH AS ALBUTEROL): ONCE A WEEK OR LESS
QUESTION_2 LAST FOUR WEEKS HOW OFTEN HAVE YOU HAD SHORTNESS OF BREATH: ONCE OR TWICE A WEEK
QUESTION_3 LAST FOUR WEEKS HOW OFTEN DID YOUR ASTHMA SYMPTOMS (WHEEZING, COUGHING, SHORTNESS OF BREATH, CHEST TIGHTNESS OR PAIN) WAKE YOU UP AT NIGHT OR EARLIER THAN USUAL IN THE MORNING: NOT AT ALL
ACUTE_EXACERBATION_TODAY: NO
QUESTION_5 LAST FOUR WEEKS HOW WOULD YOU RATE YOUR ASTHMA CONTROL: COMPLETELY CONTROLLED

## 2020-09-17 ASSESSMENT — PATIENT HEALTH QUESTIONNAIRE - PHQ9: SUM OF ALL RESPONSES TO PHQ QUESTIONS 1-9: 12

## 2020-09-17 ASSESSMENT — ANXIETY QUESTIONNAIRES
2. NOT BEING ABLE TO STOP OR CONTROL WORRYING: SEVERAL DAYS
5. BEING SO RESTLESS THAT IT IS HARD TO SIT STILL: NOT AT ALL
4. TROUBLE RELAXING: SEVERAL DAYS
IF YOU CHECKED OFF ANY PROBLEMS ON THIS QUESTIONNAIRE, HOW DIFFICULT HAVE THESE PROBLEMS MADE IT FOR YOU TO DO YOUR WORK, TAKE CARE OF THINGS AT HOME, OR GET ALONG WITH OTHER PEOPLE: NOT DIFFICULT AT ALL
GAD7 TOTAL SCORE: 6
3. WORRYING TOO MUCH ABOUT DIFFERENT THINGS: SEVERAL DAYS
7. FEELING AFRAID AS IF SOMETHING AWFUL MIGHT HAPPEN: NOT AT ALL
6. BECOMING EASILY ANNOYED OR IRRITABLE: MORE THAN HALF THE DAYS
1. FEELING NERVOUS, ANXIOUS, OR ON EDGE: SEVERAL DAYS

## 2020-09-17 ASSESSMENT — PAIN SCALES - GENERAL: PAINLEVEL: MODERATE PAIN (5)

## 2020-09-17 NOTE — NURSING NOTE
"Chief Complaint   Patient presents with     Back Pain       Initial /74 (BP Location: Right arm, Patient Position: Chair, Cuff Size: Adult Large)   Pulse 99   Temp 97.6  F (36.4  C) (Tympanic)   Ht 1.651 m (5' 5\")   SpO2 96%   BMI 46.76 kg/m   Estimated body mass index is 46.76 kg/m  as calculated from the following:    Height as of this encounter: 1.651 m (5' 5\").    Weight as of 8/19/20: 127.5 kg (281 lb).  Medication Reconciliation: complete     Lissette Meraz LPN    "

## 2020-09-18 ASSESSMENT — ANXIETY QUESTIONNAIRES: GAD7 TOTAL SCORE: 6

## 2020-09-26 DIAGNOSIS — M15.9 OSTEOARTHRITIS OF MULTIPLE JOINTS, UNSPECIFIED OSTEOARTHRITIS TYPE: ICD-10-CM

## 2020-09-28 RX ORDER — IBUPROFEN 600 MG/1
TABLET, FILM COATED ORAL
Qty: 90 TABLET | Refills: 1 | Status: SHIPPED | OUTPATIENT
Start: 2020-09-28 | End: 2021-01-12

## 2020-09-28 NOTE — TELEPHONE ENCOUNTER
Ibuprofen  Last Written Prescription Date: 10/1/19  Last Fill Quantity: 90 # of Refills: 1  Last Office Visit: 9/17/20

## 2020-10-06 ASSESSMENT — ASTHMA QUESTIONNAIRES: ACT_TOTALSCORE: 23

## 2020-10-22 DIAGNOSIS — F41.9 ANXIETY: ICD-10-CM

## 2020-10-22 DIAGNOSIS — L02.02 BOIL, FACE: ICD-10-CM

## 2020-10-22 RX ORDER — ALPRAZOLAM 0.25 MG
TABLET ORAL
Qty: 90 TABLET | Refills: 2 | Status: SHIPPED | OUTPATIENT
Start: 2020-10-22 | End: 2021-01-12

## 2020-10-22 RX ORDER — CLINDAMYCIN PHOSPHATE 10 UG/ML
LOTION TOPICAL
Qty: 60 ML | Refills: 1 | Status: SHIPPED | OUTPATIENT
Start: 2020-10-22 | End: 2022-06-23

## 2020-10-22 NOTE — TELEPHONE ENCOUNTER
xanax      Last Written Prescription Date:  8/5/20  Last Fill Quantity: 90,   # refills: 2  Last Office Visit: 9/17/20  Future Office visit:       Routing refill request to provider for review/approval because:  Drug not on the [facility], [facility] or [facility] refill protocol or controlled substance

## 2020-10-22 NOTE — TELEPHONE ENCOUNTER
clindamycin      Last Written Prescription Date:  1/20/20  Last Fill Quantity: 60ml,   # refills: 0  Last Office Visit: 9/17/20  Future Office visit:       Routing refill request to provider for review/approval because:  Drug not on the [facility], [facility] or [facility] refill protocol or controlled substance

## 2020-11-06 ENCOUNTER — MYC MEDICAL ADVICE (OUTPATIENT)
Dept: FAMILY MEDICINE | Facility: OTHER | Age: 50
End: 2020-11-06

## 2020-11-06 NOTE — TELEPHONE ENCOUNTER
We should let her know we have not received any info from them and have her call to get it here to fill those out.

## 2020-11-10 ENCOUNTER — MYC MEDICAL ADVICE (OUTPATIENT)
Dept: FAMILY MEDICINE | Facility: OTHER | Age: 50
End: 2020-11-10

## 2020-11-16 ENCOUNTER — HOSPITAL ENCOUNTER (OUTPATIENT)
Dept: CT IMAGING | Facility: HOSPITAL | Age: 50
Discharge: HOME | End: 2020-11-16
Attending: ORTHOPAEDIC SURGERY | Admitting: ORTHOPAEDIC SURGERY
Payer: COMMERCIAL

## 2020-11-16 DIAGNOSIS — M54.50 PAIN, LUMBAR REGION: ICD-10-CM

## 2020-11-16 PROCEDURE — 72131 CT LUMBAR SPINE W/O DYE: CPT

## 2020-11-22 ENCOUNTER — HEALTH MAINTENANCE LETTER (OUTPATIENT)
Age: 50
End: 2020-11-22

## 2020-12-04 ENCOUNTER — MYC MEDICAL ADVICE (OUTPATIENT)
Dept: FAMILY MEDICINE | Facility: OTHER | Age: 50
End: 2020-12-04

## 2021-01-06 ENCOUNTER — MEDICAL CORRESPONDENCE (OUTPATIENT)
Dept: INTERVENTIONAL RADIOLOGY/VASCULAR | Facility: HOSPITAL | Age: 51
End: 2021-01-06

## 2021-01-10 DIAGNOSIS — M15.9 OSTEOARTHRITIS OF MULTIPLE JOINTS, UNSPECIFIED OSTEOARTHRITIS TYPE: ICD-10-CM

## 2021-01-10 DIAGNOSIS — M54.42 MIDLINE LOW BACK PAIN WITH LEFT-SIDED SCIATICA, UNSPECIFIED CHRONICITY: ICD-10-CM

## 2021-01-10 DIAGNOSIS — F41.9 ANXIETY: ICD-10-CM

## 2021-01-10 DIAGNOSIS — M47.26 OSTEOARTHRITIS OF SPINE WITH RADICULOPATHY, LUMBAR REGION: ICD-10-CM

## 2021-01-10 DIAGNOSIS — M54.17 LUMBOSACRAL RADICULOPATHY AT S1: ICD-10-CM

## 2021-01-11 NOTE — TELEPHONE ENCOUNTER
ibuprofen      Last Written Prescription Date:  9/28/20  Last Fill Quantity: 90,   # refills: 1  Last Office Visit: 9/17/20  Future Office visit:       Routing refill request to provider for review/approval because:  Drug not on the [facility] or [facility] refill protocol or controlled substance  flexeril      Last Written Prescription Date:  1/21/20  Last Fill Quantity: 60,   # refills: 3  xanax      Last Written Prescription Date:  10/22/20  Last Fill Quantity: 90,   # refills: 2

## 2021-01-12 RX ORDER — CYCLOBENZAPRINE HCL 10 MG
TABLET ORAL
Qty: 60 TABLET | Refills: 3 | Status: SHIPPED | OUTPATIENT
Start: 2021-01-12 | End: 2021-12-27

## 2021-01-12 RX ORDER — IBUPROFEN 600 MG/1
TABLET, FILM COATED ORAL
Qty: 90 TABLET | Refills: 1 | Status: SHIPPED | OUTPATIENT
Start: 2021-01-12 | End: 2021-12-27

## 2021-01-12 RX ORDER — ALPRAZOLAM 0.25 MG
TABLET ORAL
Qty: 90 TABLET | Refills: 2 | Status: SHIPPED | OUTPATIENT
Start: 2021-01-12 | End: 2021-04-06

## 2021-02-01 ENCOUNTER — HOSPITAL ENCOUNTER (OUTPATIENT)
Facility: HOSPITAL | Age: 51
Discharge: HOME | End: 2021-02-01
Attending: RADIOLOGY | Admitting: RADIOLOGY
Payer: COMMERCIAL

## 2021-02-01 ENCOUNTER — HOSPITAL ENCOUNTER (OUTPATIENT)
Dept: INTERVENTIONAL RADIOLOGY/VASCULAR | Facility: HOSPITAL | Age: 51
Discharge: HOME | End: 2021-02-01
Admitting: RADIOLOGY
Payer: COMMERCIAL

## 2021-02-01 DIAGNOSIS — M54.50 LUMBAR PAIN: ICD-10-CM

## 2021-02-01 DIAGNOSIS — M48.07 LUMBOSACRAL STENOSIS: ICD-10-CM

## 2021-02-01 PROCEDURE — 64483 NJX AA&/STRD TFRM EPI L/S 1: CPT | Mod: 50

## 2021-02-01 PROCEDURE — 250N000009 HC RX 250: Performed by: RADIOLOGY

## 2021-02-01 PROCEDURE — 250N000011 HC RX IP 250 OP 636: Performed by: RADIOLOGY

## 2021-02-01 RX ORDER — LIDOCAINE HYDROCHLORIDE 10 MG/ML
INJECTION, SOLUTION EPIDURAL; INFILTRATION; INTRACAUDAL; PERINEURAL
Status: DISCONTINUED
Start: 2021-02-01 | End: 2021-02-02 | Stop reason: HOSPADM

## 2021-02-01 RX ORDER — IOPAMIDOL 612 MG/ML
15 INJECTION, SOLUTION INTRATHECAL ONCE
Status: COMPLETED | OUTPATIENT
Start: 2021-02-01 | End: 2021-02-01

## 2021-02-01 RX ADMIN — IOPAMIDOL 4 ML: 612 INJECTION, SOLUTION INTRATHECAL at 16:18

## 2021-02-01 RX ADMIN — LIDOCAINE HYDROCHLORIDE 5 ML: 10 INJECTION, SOLUTION EPIDURAL; INFILTRATION; INTRACAUDAL; PERINEURAL at 16:18

## 2021-02-01 NOTE — DISCHARGE INSTRUCTIONS
Cell number on file:    Telephone Information:   Mobile [phone]     Is it ok to leave a message at this number(s)? Yes    Dr. Shukla completed your procedure on 2/1/2021.    Current Pain Level (0-10 Scale): 5/10  Post Pain Level (0-10):  0/10    Radiology Discharge instructions for Steroid Injection    Activity Level:     Do not do any heavy activity or exercise for 24 hours.   Do not drive for 4 hours after your injection.  Diet:   Return to your normal diet.  Medications:   If you have stopped taking your Aspirin, Coumadin/Warfarin, Ibuprofen, or any   other blood thinner for this procedure you may resume in the morning unless   your primary care provider has given you other instructions.    Diabetics may see an increase in blood sugar after steroid injections. If you are concerned about your blood sugar, please contact your family doctor.    Site Care:  Remove the bandage and bathe or shower the morning after the procedure.      Please allow two weeks to experience improvement in your pain.  If you have any further issues, please contact your provider.    Call your Primary Care Provider if you have the following (if your primary care provider is not available please seek emergency care):   Nausea with vomiting   Severe headache   Drowsiness or confusion   Redness or drainage at the injection or puncture site   Temperature over 101 degrees F   Other concerns   Worsening back pain   Stiff neck

## 2021-02-01 NOTE — IP AVS SNAPSHOT
[facility]  [address] [address]  Phone: [phone]  Fax: [phone]                                    After Visit Summary   2/1/2021    Selma Dubose    MRN: [id number]           After Visit Summary Signature Page    I have received my discharge instructions, and my questions have been answered. I have discussed any challenges I see with this plan with the nurse or doctor.    ..........................................................................................................................................  Patient/Patient Representative Signature      ..........................................................................................................................................  Patient Representative Print Name and Relationship to Patient    ..................................................               ................................................  Date                                   Time    ..........................................................................................................................................  Reviewed by Signature/Title    ...................................................              ..............................................  Date                                               Time          22EPIC Rev 08/18

## 2021-02-26 ENCOUNTER — TRANSFERRED RECORDS (OUTPATIENT)
Dept: HEALTH INFORMATION MANAGEMENT | Facility: CLINIC | Age: 51
End: 2021-02-26

## 2021-04-05 ENCOUNTER — MYC MEDICAL ADVICE (OUTPATIENT)
Dept: FAMILY MEDICINE | Facility: OTHER | Age: 51
End: 2021-04-05

## 2021-04-05 DIAGNOSIS — F41.9 ANXIETY: ICD-10-CM

## 2021-04-05 NOTE — TELEPHONE ENCOUNTER
ALPRAZolam (XANAX) 0.25mg      Last Written Prescription Date:  1/12/2021  Last Fill Quantity: 90,   # refills: 2  Last Office Visit: 9/17/2020  Future Office visit:       Routing refill request to provider for review/approval because:

## 2021-04-06 RX ORDER — ALPRAZOLAM 0.25 MG
TABLET ORAL
Qty: 90 TABLET | Refills: 0 | Status: SHIPPED | OUTPATIENT
Start: 2021-04-06 | End: 2021-05-03

## 2021-05-03 DIAGNOSIS — F41.9 ANXIETY: ICD-10-CM

## 2021-05-03 RX ORDER — ALPRAZOLAM 0.25 MG
TABLET ORAL
Qty: 90 TABLET | Refills: 0 | Status: SHIPPED | OUTPATIENT
Start: 2021-05-03 | End: 2021-06-01

## 2021-05-03 NOTE — TELEPHONE ENCOUNTER
XANAX      Last Written Prescription Date:  4-6-2021  Last Fill Quantity: 90,   # refills: 0  Last Office Visit: 9-  Future Office visit:       Routing refill request to provider for review/approval because:  Drug not on the [facility], [facility] or [facility] refill protocol or controlled substance

## 2021-05-17 DIAGNOSIS — E03.9 HYPOTHYROIDISM, UNSPECIFIED TYPE: ICD-10-CM

## 2021-05-18 RX ORDER — LEVOTHYROXINE SODIUM 112 UG/1
TABLET ORAL
Qty: 30 TABLET | Refills: 2 | Status: SHIPPED | OUTPATIENT
Start: 2021-05-18 | End: 2022-04-20

## 2021-05-18 NOTE — TELEPHONE ENCOUNTER
Levothyroxine  Last Written Prescription Date: 9/10/20  Last Fill Quantity: 30 # of Refills: 5  Last Office Visit: 9/17/20

## 2021-05-28 DIAGNOSIS — R06.02 SOB (SHORTNESS OF BREATH): ICD-10-CM

## 2021-05-28 RX ORDER — ALBUTEROL SULFATE 90 UG/1
AEROSOL, METERED RESPIRATORY (INHALATION)
Qty: 18 G | Refills: 0 | Status: SHIPPED | OUTPATIENT
Start: 2021-05-28 | End: 2021-06-30

## 2021-05-28 NOTE — TELEPHONE ENCOUNTER
albuterol (PROAIR HFA/PROVENTIL HFA/VENTOLIN HFA) 108 (90 Base) MCG/ACT inhaler      Last Written Prescription Date:  4/22/20  Last Fill Quantity: 17g,   # refills: 0  Last Office Visit: 9/17/20  Future Office visit:       Routing refill request to provider for review/approval because:   Asthma control assessment score within normal limits in last 6 months Protocol Details    Recent (6 mo) or future (30 days) visit within the authorizing provider's specialty

## 2021-05-31 DIAGNOSIS — F41.9 ANXIETY: ICD-10-CM

## 2021-06-01 RX ORDER — ALPRAZOLAM 0.25 MG
TABLET ORAL
Qty: 90 TABLET | Refills: 0 | Status: SHIPPED | OUTPATIENT
Start: 2021-06-01 | End: 2021-06-30

## 2021-06-01 NOTE — TELEPHONE ENCOUNTER
Alprazolam 0.25 mg      Last Written Prescription Date:  5/03/21  Last Fill Quantity: 90,   # refills: 0  Last Office Visit: 9/17/20  Future Office visit:       Routing refill request to provider for review/approval because:  Drug not on the [facility], [facility] or [facility] refill protocol or controlled substance

## 2021-06-29 DIAGNOSIS — R06.02 SOB (SHORTNESS OF BREATH): ICD-10-CM

## 2021-06-29 DIAGNOSIS — F41.9 ANXIETY: ICD-10-CM

## 2021-06-30 RX ORDER — ALPRAZOLAM 0.25 MG
TABLET ORAL
Qty: 90 TABLET | Refills: 0 | Status: SHIPPED | OUTPATIENT
Start: 2021-06-30 | End: 2021-07-27

## 2021-06-30 RX ORDER — ALBUTEROL SULFATE 90 UG/1
AEROSOL, METERED RESPIRATORY (INHALATION)
Qty: 18 G | Refills: 0 | Status: SHIPPED | OUTPATIENT
Start: 2021-06-30 | End: 2022-10-20

## 2021-06-30 NOTE — TELEPHONE ENCOUNTER
Xanax       Last Written Prescription Date:  6/1/2021  Last Fill Quantity: 90,   # refills: 0    Albuterol       Last Written Prescription Date:  5/28/2021  Last Fill Quantity: 18g,   # refills: 0  Last Office Visit: 9/17/2020  Future Office visit:

## 2021-07-26 DIAGNOSIS — F41.9 ANXIETY: ICD-10-CM

## 2021-07-27 NOTE — TELEPHONE ENCOUNTER
Xanax       Last Written Prescription Date:  6/30/2021  Last Fill Quantity: 90,   # refills: 0  Last Office Visit: 9/17/2020  Future Office visit:

## 2021-07-29 RX ORDER — ALPRAZOLAM 0.25 MG
TABLET ORAL
Qty: 90 TABLET | Refills: 0 | Status: SHIPPED | OUTPATIENT
Start: 2021-07-29 | End: 2021-08-31

## 2021-08-30 DIAGNOSIS — F41.9 ANXIETY: ICD-10-CM

## 2021-08-31 NOTE — TELEPHONE ENCOUNTER
Xanax      Last Written Prescription Date:  7.29.21  Last Fill Quantity: #90,   # refills: 0  Last Office Visit: 9.17.20  Future Office visit:       Routing refill request to provider for review/approval because:  Drug not on the [facility], [facility] or [facility] refill protocol or controlled substance

## 2021-09-01 RX ORDER — ALPRAZOLAM 0.25 MG
TABLET ORAL
Qty: 90 TABLET | Refills: 0 | Status: SHIPPED | OUTPATIENT
Start: 2021-09-01 | End: 2021-09-30

## 2021-09-03 DIAGNOSIS — Z87.898 HISTORY OF TACHYCARDIA: ICD-10-CM

## 2021-09-03 RX ORDER — DILTIAZEM HYDROCHLORIDE 240 MG/1
CAPSULE, EXTENDED RELEASE ORAL
Qty: 90 CAPSULE | Refills: 0 | Status: SHIPPED | OUTPATIENT
Start: 2021-09-03 | End: 2021-12-07

## 2021-09-03 NOTE — TELEPHONE ENCOUNTER
Failed protocol    Lab Results   Component Value Date    ALT 62 08/14/2017     Creatinine   Date Value Ref Range Status   04/08/2019 0.62 0.52 - 1.04 mg/dL Final       diltiazem ER (TIAZAC) 240 MG 24 hr ER beaded capsule      Last Written Prescription Date:  8/19/20  Last Fill Quantity: 90,   # refills: 3  Last Office Visit: 9/17/20  Future Office visit:    Next 5 appointments (look out 90 days)    Sep 15, 2021 10:15 AM  (Arrive by 10:00 AM)  SHORT with Paz Maria MD  [facility] [address]  [facility] [address]  [phone]           Routing refill request to provider for review/approval because:  Drug not on the [facility], [facility] or [facility] refill protocol or controlled substance

## 2021-09-19 ENCOUNTER — HEALTH MAINTENANCE LETTER (OUTPATIENT)
Age: 51
End: 2021-09-19

## 2021-09-24 ENCOUNTER — OFFICE VISIT (OUTPATIENT)
Dept: FAMILY MEDICINE | Facility: OTHER | Age: 51
End: 2021-09-24
Attending: FAMILY MEDICINE
Payer: COMMERCIAL

## 2021-09-24 VITALS
OXYGEN SATURATION: 96 % | RESPIRATION RATE: 16 BRPM | DIASTOLIC BLOOD PRESSURE: 76 MMHG | BODY MASS INDEX: 48.96 KG/M2 | TEMPERATURE: 98 F | HEIGHT: 64 IN | SYSTOLIC BLOOD PRESSURE: 136 MMHG | HEART RATE: 74 BPM | WEIGHT: 286.8 LBS

## 2021-09-24 DIAGNOSIS — F41.9 ANXIETY: ICD-10-CM

## 2021-09-24 DIAGNOSIS — G89.29 CHRONIC BILATERAL LOW BACK PAIN WITH BILATERAL SCIATICA: ICD-10-CM

## 2021-09-24 DIAGNOSIS — M48.061 SPINAL STENOSIS OF LUMBAR REGION, UNSPECIFIED WHETHER NEUROGENIC CLAUDICATION PRESENT: ICD-10-CM

## 2021-09-24 DIAGNOSIS — E03.9 ACQUIRED HYPOTHYROIDISM: ICD-10-CM

## 2021-09-24 DIAGNOSIS — E78.5 HYPERLIPIDEMIA, UNSPECIFIED HYPERLIPIDEMIA TYPE: Primary | ICD-10-CM

## 2021-09-24 DIAGNOSIS — M54.42 CHRONIC BILATERAL LOW BACK PAIN WITH BILATERAL SCIATICA: ICD-10-CM

## 2021-09-24 DIAGNOSIS — E66.01 MORBID OBESITY (H): ICD-10-CM

## 2021-09-24 DIAGNOSIS — M54.41 CHRONIC BILATERAL LOW BACK PAIN WITH BILATERAL SCIATICA: ICD-10-CM

## 2021-09-24 PROCEDURE — 84439 ASSAY OF FREE THYROXINE: CPT | Performed by: FAMILY MEDICINE

## 2021-09-24 PROCEDURE — 80061 LIPID PANEL: CPT | Performed by: FAMILY MEDICINE

## 2021-09-24 PROCEDURE — 80048 BASIC METABOLIC PNL TOTAL CA: CPT | Performed by: FAMILY MEDICINE

## 2021-09-24 PROCEDURE — 36415 COLL VENOUS BLD VENIPUNCTURE: CPT | Performed by: FAMILY MEDICINE

## 2021-09-24 PROCEDURE — 84443 ASSAY THYROID STIM HORMONE: CPT | Performed by: FAMILY MEDICINE

## 2021-09-24 PROCEDURE — 99214 OFFICE O/P EST MOD 30 MIN: CPT | Performed by: FAMILY MEDICINE

## 2021-09-24 ASSESSMENT — ANXIETY QUESTIONNAIRES
5. BEING SO RESTLESS THAT IT IS HARD TO SIT STILL: NOT AT ALL
2. NOT BEING ABLE TO STOP OR CONTROL WORRYING: NOT AT ALL
IF YOU CHECKED OFF ANY PROBLEMS ON THIS QUESTIONNAIRE, HOW DIFFICULT HAVE THESE PROBLEMS MADE IT FOR YOU TO DO YOUR WORK, TAKE CARE OF THINGS AT HOME, OR GET ALONG WITH OTHER PEOPLE: NOT DIFFICULT AT ALL
6. BECOMING EASILY ANNOYED OR IRRITABLE: SEVERAL DAYS
7. FEELING AFRAID AS IF SOMETHING AWFUL MIGHT HAPPEN: NOT AT ALL
GAD7 TOTAL SCORE: 2
3. WORRYING TOO MUCH ABOUT DIFFERENT THINGS: NOT AT ALL
1. FEELING NERVOUS, ANXIOUS, OR ON EDGE: SEVERAL DAYS
4. TROUBLE RELAXING: NOT AT ALL

## 2021-09-24 ASSESSMENT — MIFFLIN-ST. JEOR: SCORE: 1900.92

## 2021-09-24 ASSESSMENT — PAIN SCALES - GENERAL: PAINLEVEL: SEVERE PAIN (6)

## 2021-09-24 ASSESSMENT — PATIENT HEALTH QUESTIONNAIRE - PHQ9: SUM OF ALL RESPONSES TO PHQ QUESTIONS 1-9: 9

## 2021-09-24 NOTE — Clinical Note
Patient's thyroid levels were overall normal, but I think she hasn't taken medication for about a month.  We should recheck levels again in 1-2 months, and we can see if medication needs to be restarted.

## 2021-09-24 NOTE — PROGRESS NOTES
"    Assessment & Plan     1. Hyperlipidemia, unspecified hyperlipidemia type  Labs updated, will adjust medication if needed.  Follow-up in six months, sooner as needed.  - Lipid Profile (Chol, Trig, HDL, LDL calc); Future  - Basic metabolic panel; Future  - Basic metabolic panel  - Lipid Profile (Chol, Trig, HDL, LDL calc)    2. Anxiety  No changes to current medications.    3. Acquired hypothyroidism  Patient has not been taking medication.  Will see what levels are currently.  - TSH with free T4 reflex; Future  - TSH with free T4 reflex  - T4 free    4. Chronic bilateral low back pain with bilateral sciatica  Forms completed for patient.    5. Spinal stenosis of lumbar region, unspecified whether neurogenic claudication present  As above.    6. Morbid obesity (H)  Referral ordered.  - Comprehensive Weight Management       Tobacco Cessation:   reports that she has been smoking cigarettes. She has a 24.00 pack-year smoking history. She has never used smokeless tobacco.  Tobacco Cessation Action Plan: Information offered: Patient not interested at this time    BMI:   Estimated body mass index is 49.23 kg/m  as calculated from the following:    Height as of this encounter: 1.626 m (5' 4\").    Weight as of this encounter: 130.1 kg (286 lb 12.8 oz).     No follow-ups on file.    Paz Maria MD  [facility] - HERI Hahn is a 50 year old who presents for the following health issues     HPI     Hyperlipidemia Follow-Up      Are you regularly taking any medication or supplement to lower your cholesterol?   NO    Are you having muscle aches or other side effects that you think could be caused by your cholesterol lowering medication?  No    Depression and Anxiety Follow-Up    How are you doing with your depression since your last visit? No change    How are you doing with your anxiety since your last visit?  No change    Are you having other symptoms that might be associated with " depression or anxiety? No    Have you had a significant life event? No     Do you have any concerns with your use of alcohol or other drugs? No    Social History     Tobacco Use     Smoking status: Current Every Day Smoker     Packs/day: 1.00     Years: 24.00     Pack years: 24.00     Types: Cigarettes     Smokeless tobacco: Never Used     Tobacco comment: Tried to Quit (NO); Passive Exposure (NO)   Substance Use Topics     Alcohol use: Yes     Comment: 3 Beers - WEEKLY     Drug use: No     PHQ 4/8/2019 9/17/2020 9/24/2021   PHQ-9 Total Score 8 12 9   Q9: Thoughts of better off dead/self-harm past 2 weeks Not at all Not at all Not at all     LIZETT-7 SCORE 4/8/2019 9/17/2020 9/24/2021   Total Score 6 6 2     Last PHQ-9 9/24/2021   1.  Little interest or pleasure in doing things 1   2.  Feeling down, depressed, or hopeless 1   3.  Trouble falling or staying asleep, or sleeping too much 3   4.  Feeling tired or having little energy 3   5.  Poor appetite or overeating 0   6.  Feeling bad about yourself 1   7.  Trouble concentrating 0   8.  Moving slowly or restless 0   Q9: Thoughts of better off dead/self-harm past 2 weeks 0   PHQ-9 Total Score 9   Difficulty at work, home, or with people Somewhat difficult     LIZETT-7  9/24/2021   1. Feeling nervous, anxious, or on edge 1   2. Not being able to stop or control worrying 0   3. Worrying too much about different things 0   4. Trouble relaxing 0   5. Being so restless that it is hard to sit still 0   6. Becoming easily annoyed or irritable 1   7. Feeling afraid, as if something awful might happen 0   LIZETT-7 Total Score 2   If you checked any problems, how difficult have they made it for you to do your work, take care of things at home, or get along with other people? Not difficult at all       Suicide Assessment Five-step Evaluation and Treatment (SAFE-T)    Hypothyroidism Follow-up      Since last visit, patient describes the following symptoms: Weight stable, no hair loss, no  "skin changes, no constipation, no loose stools, dry skin, constipation, loose stools, anxiety, depression and fatigue    Chronic/Recurring Back Pain Follow Up      Where is your back pain located? (Select all that apply) low back bilateral    How would you describe your back pain?  cramping, sharp, shooting and stabbing    Where does your back pain spread? the right and left buttock and the right and left  thigh    Since your last clinic visit for back pain, how has your pain changed? gradually worsening    Does your back pain interfere with your job? YES    Since your last visit, have you tried any new treatment? No     Patient understands that she needs to work on losing weight and she also needs to quit smoking.  She is having trouble being active in order to lose weight.      Musculoskeletal problem/pain  Onset/Duration: couple of years  Description  Location: knee - bilateral  Joint Swelling: YES  Redness: no  Pain: YES  Warmth: no  Intensity:  moderate  Progression of Symptoms:  worsening  Accompanying signs and symptoms:   Fevers: no  Numbness/tingling/weakness: YES- in the hands  History  Trauma to the area: no  Recent illness:  no  Previous similar problem: YES  Previous evaluation:  YES- orthopedics  Precipitating or alleviating factors:  Aggravating factors include: sitting, standing, climbing stairs, lifting and exercise  Therapies tried and outcome: rest/inactivity, heat, ice, acetaminophen and Ibuprofen      Review of Systems   Constitutional, HEENT, cardiovascular, pulmonary, gi and gu systems are negative, except as otherwise noted.      Objective    /76 (BP Location: Right arm, Patient Position: Sitting, Cuff Size: Adult Regular)   Pulse 74   Temp 98  F (36.7  C) (Tympanic)   Resp 16   Ht 1.626 m (5' 4\")   Wt 130.1 kg (286 lb 12.8 oz)   SpO2 96%   BMI 49.23 kg/m    Body mass index is 49.23 kg/m .  Physical Exam   GENERAL: alert and no distress  PSYCH: mentation appears normal, affect " normal/bright

## 2021-09-25 ASSESSMENT — ANXIETY QUESTIONNAIRES: GAD7 TOTAL SCORE: 2

## 2021-09-27 LAB
ANION GAP SERPL CALCULATED.3IONS-SCNC: 3 MMOL/L (ref 3–14)
BUN SERPL-MCNC: 6 MG/DL (ref 7–30)
CALCIUM SERPL-MCNC: 9.1 MG/DL (ref 8.5–10.1)
CHLORIDE BLD-SCNC: 106 MMOL/L (ref 94–109)
CHOLEST SERPL-MCNC: 175 MG/DL
CO2 SERPL-SCNC: 28 MMOL/L (ref 20–32)
CREAT SERPL-MCNC: 0.68 MG/DL (ref 0.52–1.04)
FASTING STATUS PATIENT QL REPORTED: NO
GFR SERPL CREATININE-BSD FRML MDRD: >90 ML/MIN/1.73M2
GLUCOSE BLD-MCNC: 103 MG/DL (ref 70–99)
HDLC SERPL-MCNC: 26 MG/DL
LDLC SERPL CALC-MCNC: 123 MG/DL
NONHDLC SERPL-MCNC: 149 MG/DL
POTASSIUM BLD-SCNC: 3.9 MMOL/L (ref 3.4–5.3)
SODIUM SERPL-SCNC: 137 MMOL/L (ref 133–144)
T4 FREE SERPL-MCNC: 0.83 NG/DL (ref 0.76–1.46)
TRIGL SERPL-MCNC: 132 MG/DL
TSH SERPL DL<=0.005 MIU/L-ACNC: 4.27 MU/L (ref 0.4–4)

## 2021-09-30 DIAGNOSIS — F41.9 ANXIETY: ICD-10-CM

## 2021-09-30 RX ORDER — ALPRAZOLAM 0.25 MG
TABLET ORAL
Qty: 90 TABLET | Refills: 1 | Status: SHIPPED | OUTPATIENT
Start: 2021-09-30 | End: 2021-12-02

## 2021-09-30 NOTE — TELEPHONE ENCOUNTER
alprazolam  Last Written Prescription Date:  9/1/2021  Last Fill Quantity: 90,   # refills: 0  Last Office Visit: 9/24/2021  Future Office visit:    Next 5 appointments (look out 90 days)    Oct 15, 2021 11:20 AM  (Arrive by 11:05 AM)  Office Visit with Kayleen Ugarte NP  [facility] [place] ([facility] [place] ) [address]  [place] [address]  [phone]

## 2021-10-26 ENCOUNTER — TELEPHONE (OUTPATIENT)
Dept: FAMILY MEDICINE | Facility: OTHER | Age: 51
End: 2021-10-26

## 2021-10-26 NOTE — TELEPHONE ENCOUNTER
LVM for patient to call back and reschedule appt with Kayleen Ugarte, originally scheduled on 11/02.

## 2021-10-27 ENCOUNTER — MYC MEDICAL ADVICE (OUTPATIENT)
Dept: FAMILY MEDICINE | Facility: OTHER | Age: 51
End: 2021-10-27

## 2021-11-30 DIAGNOSIS — F41.9 ANXIETY: ICD-10-CM

## 2021-12-02 RX ORDER — ALPRAZOLAM 0.25 MG
TABLET ORAL
Qty: 90 TABLET | Refills: 1 | Status: SHIPPED | OUTPATIENT
Start: 2021-12-02 | End: 2022-01-25

## 2021-12-02 NOTE — TELEPHONE ENCOUNTER
Xanax      Last Written Prescription Date:  9/30/21  Last Fill Quantity: 90,   # refills: 1  Last Office Visit: 9/24/21  Future Office visit:       Routing refill request to provider for review/approval because:

## 2021-12-03 DIAGNOSIS — Z87.898 HISTORY OF TACHYCARDIA: ICD-10-CM

## 2021-12-06 NOTE — TELEPHONE ENCOUNTER
tiadylt      Last Written Prescription Date:  9/3/21  Last Fill Quantity: 90,   # refills: 0  Last Office Visit: 9/24/21  Future Office visit:

## 2021-12-07 RX ORDER — DILTIAZEM HYDROCHLORIDE 240 MG/1
CAPSULE, EXTENDED RELEASE ORAL
Qty: 90 CAPSULE | Refills: 1 | Status: SHIPPED | OUTPATIENT
Start: 2021-12-07 | End: 2022-05-31

## 2021-12-24 DIAGNOSIS — M54.42 MIDLINE LOW BACK PAIN WITH LEFT-SIDED SCIATICA, UNSPECIFIED CHRONICITY: ICD-10-CM

## 2021-12-24 DIAGNOSIS — M15.9 OSTEOARTHRITIS OF MULTIPLE JOINTS, UNSPECIFIED OSTEOARTHRITIS TYPE: ICD-10-CM

## 2021-12-24 DIAGNOSIS — M47.26 OSTEOARTHRITIS OF SPINE WITH RADICULOPATHY, LUMBAR REGION: ICD-10-CM

## 2021-12-24 DIAGNOSIS — M54.17 LUMBOSACRAL RADICULOPATHY AT S1: ICD-10-CM

## 2021-12-24 NOTE — TELEPHONE ENCOUNTER
MOTRIN      Last Written Prescription Date:  1-12-21  Last Fill Quantity: 90,   # refills: 1  Last Office Visit: 9-24-21             FLEXERIL   Last Written Prescription Date:  1-12-21  Last Fill Quantity: 60,   # refills: 3  Last Office Visit: 9-24-21  Future Office visit:       Routing refill request to provider for review/approval because:  Drug not on the [facility], P or [facility] refill protocol or controlled substance

## 2021-12-27 ENCOUNTER — OFFICE VISIT (OUTPATIENT)
Dept: FAMILY MEDICINE | Facility: OTHER | Age: 51
End: 2021-12-27
Attending: NURSE PRACTITIONER
Payer: COMMERCIAL

## 2021-12-27 VITALS
DIASTOLIC BLOOD PRESSURE: 68 MMHG | HEART RATE: 71 BPM | BODY MASS INDEX: 48.83 KG/M2 | HEIGHT: 64 IN | WEIGHT: 286 LBS | SYSTOLIC BLOOD PRESSURE: 124 MMHG | OXYGEN SATURATION: 95 %

## 2021-12-27 DIAGNOSIS — N30.00 ACUTE CYSTITIS WITHOUT HEMATURIA: Primary | ICD-10-CM

## 2021-12-27 DIAGNOSIS — R10.9 RIGHT FLANK PAIN: ICD-10-CM

## 2021-12-27 LAB
ALBUMIN UR-MCNC: NEGATIVE MG/DL
APPEARANCE UR: CLEAR
BACTERIA #/AREA URNS HPF: ABNORMAL /HPF
BASOPHILS # BLD AUTO: 0.1 10E3/UL (ref 0–0.2)
BASOPHILS NFR BLD AUTO: 1 %
BILIRUB UR QL STRIP: NEGATIVE
COLOR UR AUTO: YELLOW
EOSINOPHIL # BLD AUTO: 0.4 10E3/UL (ref 0–0.7)
EOSINOPHIL NFR BLD AUTO: 5 %
ERYTHROCYTE [DISTWIDTH] IN BLOOD BY AUTOMATED COUNT: 14.1 % (ref 10–15)
GLUCOSE UR STRIP-MCNC: NEGATIVE MG/DL
HCT VFR BLD AUTO: 45.1 % (ref 35–47)
HGB BLD-MCNC: 15.2 G/DL (ref 11.7–15.7)
HGB UR QL STRIP: NEGATIVE
HOLD SPECIMEN: NORMAL
KETONES UR STRIP-MCNC: NEGATIVE MG/DL
LEUKOCYTE ESTERASE UR QL STRIP: ABNORMAL
LYMPHOCYTES # BLD AUTO: 2.7 10E3/UL (ref 0.8–5.3)
LYMPHOCYTES NFR BLD AUTO: 33 %
MCH RBC QN AUTO: 32.1 PG (ref 26.5–33)
MCHC RBC AUTO-ENTMCNC: 33.7 G/DL (ref 31.5–36.5)
MCV RBC AUTO: 95 FL (ref 78–100)
MONOCYTES # BLD AUTO: 0.9 10E3/UL (ref 0–1.3)
MONOCYTES NFR BLD AUTO: 10 %
NEUTROPHILS # BLD AUTO: 4.3 10E3/UL (ref 1.6–8.3)
NEUTROPHILS NFR BLD AUTO: 51 %
NITRATE UR QL: NEGATIVE
PH UR STRIP: 6 [PH] (ref 5–7)
PLATELET # BLD AUTO: 345 10E3/UL (ref 150–450)
RBC # BLD AUTO: 4.74 10E6/UL (ref 3.8–5.2)
RBC #/AREA URNS AUTO: ABNORMAL /HPF
SP GR UR STRIP: 1.01 (ref 1–1.03)
SQUAMOUS #/AREA URNS AUTO: ABNORMAL /LPF
UROBILINOGEN UR STRIP-ACNC: 0.2 E.U./DL
WBC # BLD AUTO: 8.4 10E3/UL (ref 4–11)
WBC #/AREA URNS AUTO: ABNORMAL /HPF

## 2021-12-27 PROCEDURE — 81001 URINALYSIS AUTO W/SCOPE: CPT | Performed by: NURSE PRACTITIONER

## 2021-12-27 PROCEDURE — 99214 OFFICE O/P EST MOD 30 MIN: CPT | Performed by: NURSE PRACTITIONER

## 2021-12-27 PROCEDURE — 85025 COMPLETE CBC W/AUTO DIFF WBC: CPT | Performed by: NURSE PRACTITIONER

## 2021-12-27 PROCEDURE — 36415 COLL VENOUS BLD VENIPUNCTURE: CPT | Performed by: NURSE PRACTITIONER

## 2021-12-27 PROCEDURE — 87086 URINE CULTURE/COLONY COUNT: CPT | Performed by: NURSE PRACTITIONER

## 2021-12-27 RX ORDER — CYCLOBENZAPRINE HCL 10 MG
TABLET ORAL
Qty: 60 TABLET | Refills: 3 | Status: SHIPPED | OUTPATIENT
Start: 2021-12-27 | End: 2023-02-13

## 2021-12-27 RX ORDER — IBUPROFEN 600 MG/1
TABLET, FILM COATED ORAL
Qty: 90 TABLET | Refills: 1 | Status: SHIPPED | OUTPATIENT
Start: 2021-12-27 | End: 2022-04-28

## 2021-12-27 RX ORDER — SULFAMETHOXAZOLE/TRIMETHOPRIM 800-160 MG
1 TABLET ORAL 2 TIMES DAILY
Qty: 14 TABLET | Refills: 0 | Status: SHIPPED | OUTPATIENT
Start: 2021-12-27 | End: 2022-01-03

## 2021-12-27 ASSESSMENT — PAIN SCALES - GENERAL: PAINLEVEL: MODERATE PAIN (4)

## 2021-12-27 ASSESSMENT — MIFFLIN-ST. JEOR: SCORE: 1892.29

## 2021-12-27 NOTE — PROGRESS NOTES
"  Assessment & Plan     1. Acute cystitis without hematuria  Increase water intake  - sulfamethoxazole-trimethoprim (BACTRIM DS) 800-160 MG tablet; Take 1 tablet by mouth 2 times daily for 7 days  Dispense: 14 tablet; Refill: 0    2. Right flank pain  - CBC with platelets and differential - normal  - *UA reflex to Microscopic and Culture - [facility]/[place] No blood noted.    - Urine Microscopic Exam  - Urine Culture - pending  - sulfamethoxazole-trimethoprim (BACTRIM DS) 800-160 MG tablet; Take 1 tablet by mouth 2 times daily for 7 days  Dispense: 14 tablet; Refill: 0        Review of the result(s) of each unique test - CBC, UA         BMI:   Estimated body mass index is 49.09 kg/m  as calculated from the following:    Height as of this encounter: 1.626 m (5' 4\").    Weight as of this encounter: 129.7 kg (286 lb).       Will notify of culture results as they are returned. Follow-up if no improvement or no infection on culture.      Analisa Guzman NP  [facility] - [place] [facility]    Jose Alberto Hahn is a 51 year old who presents for the following health issues     HPI     Concern - Flank side pain   Onset: After shoveling a couple weeks ago but then totally resolved.  This pain started again on 12/23/2021 and has been worsening.    Description: sharp deep pain on right side worse when laying down and with activities-- no urinary symptoms noted   Intensity: moderate, severe  Progression of Symptoms:  worsening  Accompanying Signs & Symptoms: pain   Previous history of similar problem: none  Precipitating factors:        Worsened by: activity   Alleviating factors:        Improved by: none   Therapies tried and outcome: ibuprofen, flexeril             Review of Systems   Constitutional, HEENT, cardiovascular, pulmonary, gi and gu systems are negative, except as otherwise noted.      Objective    /68 (BP Location: Right arm, Patient Position: Chair, Cuff Size: Adult Large)   Pulse 71   Ht 1.626 " "keila (5' 4\")   Wt 129.7 kg (286 lb)   SpO2 95%   BMI 49.09 kg/m    Body mass index is 49.09 kg/m .  Physical Exam   GENERAL: alert, no distress and obese  RESP: lungs clear to auscultation - no rales, rhonchi or wheezes  CV: regular rate and rhythm, normal S1 S2, no S3 or S4, no murmur, click or rub, no peripheral edema and peripheral pulses strong  MS: no gross musculoskeletal defects noted, no edema.  Right flank pain that is not reproducible with palpation or movement.    PSYCH: mentation appears normal, affect normal/bright    Results for orders placed or performed in visit on 12/27/21 (from the past 24 hour(s))   CBC with platelets and differential    Narrative    The following orders were created for panel order CBC with platelets and differential.  Procedure                               Abnormality         Status                     ---------                               -----------         ------                     CBC with platelets and d...[[id number]]                      Final result                 Please view results for these tests on the individual orders.   Extra Tube    Narrative    The following orders were created for panel order Extra Tube.  Procedure                               Abnormality         Status                     ---------                               -----------         ------                     Extra Serum Separator Tu...[[id number]]                      Final result                 Please view results for these tests on the individual orders.   CBC with platelets and differential   Result Value Ref Range    WBC Count 8.4 4.0 - 11.0 10e3/uL    RBC Count 4.74 3.80 - 5.20 10e6/uL    Hemoglobin 15.2 11.7 - 15.7 g/dL    Hematocrit 45.1 35.0 - 47.0 %    MCV 95 78 - 100 fL    MCH 32.1 26.5 - 33.0 pg    MCHC 33.7 31.5 - 36.5 g/dL    RDW 14.1 10.0 - 15.0 %    Platelet Count 345 150 - 450 10e3/uL    % Neutrophils 51 %    % Lymphocytes 33 %    % Monocytes 10 %    % Eosinophils 5 %    % " Basophils 1 %    Absolute Neutrophils 4.3 1.6 - 8.3 10e3/uL    Absolute Lymphocytes 2.7 0.8 - 5.3 10e3/uL    Absolute Monocytes 0.9 0.0 - 1.3 10e3/uL    Absolute Eosinophils 0.4 0.0 - 0.7 10e3/uL    Absolute Basophils 0.1 0.0 - 0.2 10e3/uL   Extra Serum Separator Tube (SST)   Result Value Ref Range    Hold Specimen JIC    *UA reflex to Microscopic and Culture - [facility]/[place]    Specimen: Urine, Midstream   Result Value Ref Range    Color Urine Yellow Colorless, Straw, Light Yellow, Yellow    Appearance Urine Clear Clear    Glucose Urine Negative Negative mg/dL    Bilirubin Urine Negative Negative    Ketones Urine Negative Negative mg/dL    Specific Gravity Urine 1.010 1.003 - 1.035    Blood Urine Negative Negative    pH Urine 6.0 5.0 - 7.0    Protein Albumin Urine Negative Negative mg/dL    Urobilinogen Urine 0.2 0.2, 1.0 E.U./dL    Nitrite Urine Negative Negative    Leukocyte Esterase Urine Moderate (A) Negative   Urine Microscopic Exam   Result Value Ref Range    Bacteria Urine Few (A) None Seen /HPF    RBC Urine 0-2 0-2 /HPF /HPF    WBC Urine 0-5 0-5 /HPF /HPF    Squamous Epithelials Urine Moderate (A) None Seen /LPF

## 2021-12-27 NOTE — NURSING NOTE
"Chief Complaint   Patient presents with     Flank Pain       Initial /68 (BP Location: Right arm, Patient Position: Chair, Cuff Size: Adult Large)   Pulse 71   Ht 1.626 m (5' 4\")   Wt 129.7 kg (286 lb)   SpO2 95%   BMI 49.09 kg/m   Estimated body mass index is 49.09 kg/m  as calculated from the following:    Height as of this encounter: 1.626 m (5' 4\").    Weight as of this encounter: 129.7 kg (286 lb).  Medication Reconciliation: complete  Lissette Meraz LPN    "

## 2021-12-27 NOTE — PATIENT INSTRUCTIONS
"  Assessment & Plan     1. Acute cystitis without hematuria  Increase water intake  - sulfamethoxazole-trimethoprim (BACTRIM DS) 800-160 MG tablet; Take 1 tablet by mouth 2 times daily for 7 days  Dispense: 14 tablet; Refill: 0    2. Right flank pain  - CBC with platelets and differential  - *UA reflex to Microscopic and Culture - [facility]/[place]  - Urine Microscopic Exam  - Urine Culture - pending  - sulfamethoxazole-trimethoprim (BACTRIM DS) 800-160 MG tablet; Take 1 tablet by mouth 2 times daily for 7 days  Dispense: 14 tablet; Refill: 0        Review of the result(s) of each unique test - CBC, UA         BMI:   Estimated body mass index is 49.09 kg/m  as calculated from the following:    Height as of this encounter: 1.626 m (5' 4\").    Weight as of this encounter: 129.7 kg (286 lb).       Will notify of culture results as they are returned.     Analisa Guzman NP  [facility] - [facility]      Patient Education     Bladder Infection, Female (Adult)     Urine normally doesn't have any germs (bacteria) in it. But bacteria can get into the urinary tract from the skin around the rectum. Or they can travel in the blood from other parts of the body. Once they are in your urinary tract, they can cause infection in these areas:    The urethra (urethritis)    The bladder (cystitis)    The kidneys (pyelonephritis)  The most common place for an infection is in the bladder. This is called a bladder infection. This is one of the most common infections in women. Most bladder infections are easily treated. They are not serious unless the infection spreads to the kidney.  The terms bladder infection, UTI, and cystitis are often used to describe the same thing. But they are not always the same. Cystitis is an inflammation of the bladder. The most common cause of cystitis is an infection.  Symptoms  The infection causes inflammation in the urethra and bladder. This causes many of the symptoms. The most common " symptoms of a bladder infection are:    Pain or burning when urinating    Having to urinate more often than normal    Urgent need to urinate    Only a small amount of urine comes out    Blood in urine    Belly (abdominal) discomfort. This is often in the lower belly above the pubic bone.    Cloudy urine    Strong- or bad-smelling urine    Unable to urinate (urinary retention)    Unable to hold urine in (urinary incontinence)    Fever    Loss of appetite    Confusion (in older adults)  Causes  Bladder infections are not contagious. You can't get one from someone else, from a toilet seat, or from sharing a bath.  The most common cause of bladder infections is bacteria from the bowels. The bacteria get onto the skin around the opening of the urethra. From there, they can get into the urine. Then they travel up to the bladder, causing inflammation and infection. This often happens because of:    Wiping incorrectly after urinating. Always wipe from front to back.    Bowel incontinence    Pregnancy    Procedures such as having a catheter put in    Older age    Not emptying your bladder. This can give bacteria a chance to grow in your urine.    Fluid loss (dehydration)    Constipation    Having sex    Using a diaphragm for birth control   Treatment  Bladder infections are diagnosed by a urine test and urine culture. They are treated with antibiotics. They often clear up quickly without problems. Treatment helps prevent a more serious kidney infection.  Medicines  Medicines can help in the treatment of a bladder infection:    Take antibiotics until they are used up, even if you feel better. It's important to finish them to make sure the infection has cleared.    You can use acetaminophen or ibuprofen for pain, fever, or discomfort, unless another medicine was prescribed. If you have long-term (chronic) liver or kidney disease, talk with your healthcare provider before using these medicines. Also talk with your provider if  you've ever had a stomach ulcer or GI (gastrointestinal) bleeding, or are taking blood-thinner medicines.    If you are given phenazopydridine to reduce burning with urination, it will make your urine a bright orange color. This can stain clothing.  Care and prevention  These self-care steps can help prevent future infections:    Drink plenty of fluids. This helps to prevent dehydration and flush out your bladder. Do this unless you must restrict fluids for other health reasons, or your healthcare provider told you not to.    Clean yourself correctly after going to the bathroom. Wipe from front to back after using the toilet. This helps prevent the spread of bacteria.    Urinate more often. Don't try to hold urine in for a long time.    Wear loose-fitting clothes and cotton underwear. Don't wear tight-fitting pants.    Improve your diet and prevent constipation. Eat more fresh fruits and vegetables, and fiber. Eat less junk foods and fatty foods.    Don't have sex until your symptoms are gone.    Don't have caffeine, alcohol, and spicy foods. These can irritate your bladder.    Urinate right after you have sex to flush out your bladder.    If you use birth control pills and have frequent bladder infections, discuss it with your healthcare provider.  Follow-up care  Call your healthcare provider if all symptoms are not gone after 3 days of treatment. This is especially important if you have repeat infections.  If a culture was done, you will be told if your treatment needs to be changed. If directed, you can call to find out the results.  If X-rays were done, you will be told if the results will affect your treatment.  Call 911  Call 911 if any of the following occur:    Trouble breathing    Hard to wake up or confusion    Fainting (loss of consciousness)    Fast heart rate  When to get medical advice  Call your healthcare provider right away if any of these occur:    Fever of 100.4 F (38.0 C) or higher, or as  directed by your healthcare provider    Symptoms are not better after 3 days of treatment    Back or belly pain that gets worse    Repeated vomiting, or unable to keep medicine down    Weakness or dizziness    Vaginal discharge    Pain, redness, or swelling in the outer vaginal area (labia)  Jackie last reviewed this educational content on 11/1/2019 2000-2021 [employer]. All rights reserved. This information is not intended as a substitute for professional medical care. Always follow your healthcare professional's instructions.

## 2021-12-29 LAB — BACTERIA UR CULT: NORMAL

## 2022-01-04 ENCOUNTER — E-VISIT (OUTPATIENT)
Dept: FAMILY MEDICINE | Facility: OTHER | Age: 52
End: 2022-01-04
Attending: FAMILY MEDICINE
Payer: COMMERCIAL

## 2022-01-04 DIAGNOSIS — M54.41 CHRONIC BILATERAL LOW BACK PAIN WITH BILATERAL SCIATICA: ICD-10-CM

## 2022-01-04 DIAGNOSIS — M54.42 CHRONIC BILATERAL LOW BACK PAIN WITH BILATERAL SCIATICA: ICD-10-CM

## 2022-01-04 DIAGNOSIS — M47.26 OSTEOARTHRITIS OF SPINE WITH RADICULOPATHY, LUMBAR REGION: Primary | ICD-10-CM

## 2022-01-04 DIAGNOSIS — M54.17 LUMBOSACRAL RADICULOPATHY AT S1: ICD-10-CM

## 2022-01-04 DIAGNOSIS — G89.29 CHRONIC BILATERAL LOW BACK PAIN WITH BILATERAL SCIATICA: ICD-10-CM

## 2022-01-04 PROCEDURE — 99421 OL DIG E/M SVC 5-10 MIN: CPT | Performed by: FAMILY MEDICINE

## 2022-01-06 NOTE — PATIENT INSTRUCTIONS
Thank you for choosing us for your care. I have placed an order for a MRI.    View your full visit summary for details by clicking on the link below.       We will notify you once we have had a change to review your results.    Take care,  Paz Maria MD

## 2022-01-06 NOTE — TELEPHONE ENCOUNTER
Provider E-Visit time total (minutes): 6    ELECTRONIC VISIT DOCUMENTATION:    SUBJECTIVE:  Note above accurately captures the substance of my conversation with the patient.    ASSESSMENT/ PLAN:  1. Osteoarthritis of spine with radiculopathy, lumbar region  MR ordered as requested, follow-up with results when available.  - MR Lumbar Spine w/o & w Contrast; Future    2. Lumbosacral radiculopathy at S1  - MR Lumbar Spine w/o & w Contrast; Future    3. Chronic bilateral low back pain with bilateral sciatica  - MR Lumbar Spine w/o & w Contrast; Future      Paz Maria MD

## 2022-01-08 ENCOUNTER — HEALTH MAINTENANCE LETTER (OUTPATIENT)
Age: 52
End: 2022-01-08

## 2022-01-11 ENCOUNTER — HOSPITAL ENCOUNTER (OUTPATIENT)
Dept: MRI IMAGING | Facility: HOSPITAL | Age: 52
Discharge: HOME | End: 2022-01-11
Attending: FAMILY MEDICINE | Admitting: FAMILY MEDICINE
Payer: COMMERCIAL

## 2022-01-11 DIAGNOSIS — M47.26 OSTEOARTHRITIS OF SPINE WITH RADICULOPATHY, LUMBAR REGION: ICD-10-CM

## 2022-01-11 DIAGNOSIS — M54.17 LUMBOSACRAL RADICULOPATHY AT S1: ICD-10-CM

## 2022-01-11 DIAGNOSIS — G89.29 CHRONIC BILATERAL LOW BACK PAIN WITH BILATERAL SCIATICA: ICD-10-CM

## 2022-01-11 DIAGNOSIS — M54.42 CHRONIC BILATERAL LOW BACK PAIN WITH BILATERAL SCIATICA: ICD-10-CM

## 2022-01-11 DIAGNOSIS — M54.41 CHRONIC BILATERAL LOW BACK PAIN WITH BILATERAL SCIATICA: ICD-10-CM

## 2022-01-11 PROCEDURE — 72148 MRI LUMBAR SPINE W/O DYE: CPT

## 2022-01-24 DIAGNOSIS — F41.9 ANXIETY: ICD-10-CM

## 2022-01-25 RX ORDER — ALPRAZOLAM 0.25 MG
TABLET ORAL
Qty: 90 TABLET | Refills: 1 | Status: SHIPPED | OUTPATIENT
Start: 2022-01-25 | End: 2022-03-21

## 2022-01-25 NOTE — TELEPHONE ENCOUNTER
Xanax      Last Written Prescription Date:  12/02/21  Last Fill Quantity: 90,   # refills: 1  Last Office Visit: 09/24/21  Future Office visit:

## 2022-02-04 ENCOUNTER — E-VISIT (OUTPATIENT)
Dept: FAMILY MEDICINE | Facility: OTHER | Age: 52
End: 2022-02-04
Attending: FAMILY MEDICINE
Payer: COMMERCIAL

## 2022-02-04 DIAGNOSIS — M47.26 OSTEOARTHRITIS OF SPINE WITH RADICULOPATHY, LUMBAR REGION: ICD-10-CM

## 2022-02-04 DIAGNOSIS — M54.42 CHRONIC BILATERAL LOW BACK PAIN WITH BILATERAL SCIATICA: Primary | ICD-10-CM

## 2022-02-04 DIAGNOSIS — M54.41 CHRONIC BILATERAL LOW BACK PAIN WITH BILATERAL SCIATICA: Primary | ICD-10-CM

## 2022-02-04 DIAGNOSIS — M48.061 SPINAL STENOSIS OF LUMBAR REGION, UNSPECIFIED WHETHER NEUROGENIC CLAUDICATION PRESENT: ICD-10-CM

## 2022-02-04 DIAGNOSIS — G89.29 CHRONIC BILATERAL LOW BACK PAIN WITH BILATERAL SCIATICA: Primary | ICD-10-CM

## 2022-02-04 DIAGNOSIS — M54.17 LUMBOSACRAL RADICULOPATHY AT S1: ICD-10-CM

## 2022-02-04 PROCEDURE — 99422 OL DIG E/M SVC 11-20 MIN: CPT | Performed by: FAMILY MEDICINE

## 2022-02-08 NOTE — TELEPHONE ENCOUNTER
Provider E-Visit time total (minutes): 15    ELECTRONIC VISIT DOCUMENTATION:    SUBJECTIVE:  Note above accurately captures the substance of my conversation with the patient.    ASSESSMENT/ PLAN:  1. Chronic bilateral low back pain with bilateral sciatica  Papers completed, follow-up as directed.    2. Osteoarthritis of spine with radiculopathy, lumbar region    3. Lumbosacral radiculopathy at S1    4. Spinal stenosis of lumbar region, unspecified whether neurogenic claudication present      Paz Maria MD

## 2022-02-16 ENCOUNTER — MYC MEDICAL ADVICE (OUTPATIENT)
Dept: FAMILY MEDICINE | Facility: OTHER | Age: 52
End: 2022-02-16
Payer: COMMERCIAL

## 2022-03-19 DIAGNOSIS — F41.9 ANXIETY: ICD-10-CM

## 2022-03-21 RX ORDER — ALPRAZOLAM 0.25 MG
TABLET ORAL
Qty: 90 TABLET | Refills: 1 | Status: SHIPPED | OUTPATIENT
Start: 2022-03-21 | End: 2022-05-23

## 2022-03-21 NOTE — TELEPHONE ENCOUNTER
Xanax  Last Written Prescription Date: 1/25/22  Last Fill Quantity: 90 # of Refills: 1  Last Office Visit: 12/27/21

## 2022-04-19 DIAGNOSIS — E03.9 HYPOTHYROIDISM, UNSPECIFIED TYPE: ICD-10-CM

## 2022-04-20 RX ORDER — LEVOTHYROXINE SODIUM 112 UG/1
TABLET ORAL
Qty: 30 TABLET | Refills: 2 | Status: SHIPPED | OUTPATIENT
Start: 2022-04-20 | End: 2023-02-14

## 2022-04-20 NOTE — TELEPHONE ENCOUNTER
levothyroxine      Last Written Prescription Date:  5/18/21  Last Fill Quantity: 30,   # refills: 2  Last Office Visit: 12/27/21  Future Office visit:

## 2022-04-27 DIAGNOSIS — M15.9 OSTEOARTHRITIS OF MULTIPLE JOINTS, UNSPECIFIED OSTEOARTHRITIS TYPE: ICD-10-CM

## 2022-04-28 RX ORDER — IBUPROFEN 600 MG/1
TABLET, FILM COATED ORAL
Qty: 90 TABLET | Refills: 1 | Status: SHIPPED | OUTPATIENT
Start: 2022-04-28 | End: 2022-06-23

## 2022-04-28 NOTE — TELEPHONE ENCOUNTER
ADVIL 600      Last Written Prescription Date:  12-  Last Fill Quantity: 90,   # refills: 1  Last Office Visit: 12-  Future Office visit:       Routing refill request to provider for review/approval because:  Drug not on the [facility], P or [facility] refill protocol or controlled substance

## 2022-05-20 DIAGNOSIS — F41.9 ANXIETY: ICD-10-CM

## 2022-05-23 RX ORDER — ALPRAZOLAM 0.25 MG
TABLET ORAL
Qty: 90 TABLET | Refills: 0 | Status: SHIPPED | OUTPATIENT
Start: 2022-05-23 | End: 2022-06-23

## 2022-05-23 NOTE — TELEPHONE ENCOUNTER
Xanax      Last Written Prescription Date:  4.19.22  Last Fill Quantity: #90,   # refills: 0  Last Office Visit: 9.24.21  Future Office visit:       Routing refill request to provider for review/approval because:  Drug not on the [facility], P or [facility] refill protocol or controlled substance

## 2022-05-28 DIAGNOSIS — Z87.898 HISTORY OF TACHYCARDIA: ICD-10-CM

## 2022-05-31 RX ORDER — DILTIAZEM HYDROCHLORIDE 240 MG/1
CAPSULE, EXTENDED RELEASE ORAL
Qty: 90 CAPSULE | Refills: 0 | Status: SHIPPED | OUTPATIENT
Start: 2022-05-31 | End: 2022-08-22

## 2022-05-31 NOTE — TELEPHONE ENCOUNTER
TIADYLT  MG CAPSULE     Last Written Prescription Date:  12/7/21  Last Fill Quantity: 90,   # refills: 1  Last Office Visit: 2/4/22  Future Office visit:       Routing refill request to provider for review/approval because:    Protocol failed, NO Normal ALT in past 12 months    Lab Results   Component Value Date    ALT 62 08/14/2017

## 2022-06-20 ENCOUNTER — MYC REFILL (OUTPATIENT)
Dept: FAMILY MEDICINE | Facility: OTHER | Age: 52
End: 2022-06-20
Payer: COMMERCIAL

## 2022-06-20 DIAGNOSIS — F41.9 ANXIETY: ICD-10-CM

## 2022-06-21 ENCOUNTER — E-VISIT (OUTPATIENT)
Dept: FAMILY MEDICINE | Facility: OTHER | Age: 52
End: 2022-06-21
Attending: FAMILY MEDICINE
Payer: COMMERCIAL

## 2022-06-21 DIAGNOSIS — R07.0 THROAT PAIN: Primary | ICD-10-CM

## 2022-06-21 DIAGNOSIS — F41.9 ANXIETY: ICD-10-CM

## 2022-06-21 RX ORDER — ALPRAZOLAM 0.25 MG
TABLET ORAL
Qty: 90 TABLET | Refills: 0 | OUTPATIENT
Start: 2022-06-21

## 2022-06-21 NOTE — TELEPHONE ENCOUNTER
ALPRAZolam (XANAX) 0.25 MG tablet      Last Written Prescription Date:  5/23/22  Last Fill Quantity: 90,   # refills: 0  Last Office Visit: 9/24/21  Future Office visit:       Routing refill request to provider for review/approval because:  Drug not on the [facility], [facility] or [facility] refill protocol or controlled substance

## 2022-06-22 ENCOUNTER — MYC REFILL (OUTPATIENT)
Dept: FAMILY MEDICINE | Facility: OTHER | Age: 52
End: 2022-06-22

## 2022-06-22 DIAGNOSIS — F41.9 ANXIETY: ICD-10-CM

## 2022-06-22 NOTE — TELEPHONE ENCOUNTER
Attempt # 1  Outcome: Left Message   Comment: Needs to scheduled med follow up for anxiety with PCP

## 2022-06-22 NOTE — PROGRESS NOTES
Assessment & Plan     1. Hyperlipidemia, unspecified hyperlipidemia type  Labs updated, will adjust medication as needed.  Follow-up in six months, sooner as needed.  - Lipid Profile (Chol, Trig, HDL, LDL calc); Future  - ALT; Future  - Lipid Profile (Chol, Trig, HDL, LDL calc)  - ALT    2. Anxiety  No changes to current medication.  - Basic metabolic panel; Future  - Basic metabolic panel    3. Moderate persistent asthma without complication  No changes    4. Acquired hypothyroidism  Labs updated  - TSH with free T4 reflex; Future  - TSH with free T4 reflex  - T4 free    5. Spinal stenosis of lumbar region, unspecified whether neurogenic claudication present  No changes    6. Spina bifida with hydrocephalus, unspecified spinal region (H)  No changes  - Basic metabolic panel; Future  - Basic metabolic panel    7. Osteoarthritis of multiple joints, unspecified osteoarthritis type  Refilled  - ibuprofen (ADVIL/MOTRIN) 600 MG tablet; Take 1 tablet (600 mg) by mouth every 8 hours as needed for moderate pain  Dispense: 90 tablet; Refill: 2    8. Acute bacterial pharyngitis  Given symptoms and appearance of throat, I elected to treat with antibiotics.  - penicillin V (VEETID) 500 MG tablet; Take 1 tablet (500 mg) by mouth 2 times daily for 10 days  Dispense: 20 tablet; Refill: 0    9. Throat pain  - Streptococcus A Rapid Scr w Reflx to PCR ([facility]/[place] Only)  - Group A Streptococcus PCR Throat Swab    10. Boil, face  Refilled  - clindamycin (CLEOCIN T) 1 % external lotion; Apply topically 2 times daily  Dispense: 60 mL; Refill: 1    11. Colon cancer screening  Ordered  - COLTHEA([employer])    12. Encounter for screening mammogram for breast cancer  Ordered  - MA Screen Bilateral w/Vignesh; Future    13. Morbid obesity (H)    14. Need for vaccination  Updated  - ZOSTER VACCINE RECOMBINANT ADJUVANTED IM NJX         BMI:   Estimated body mass index is 47.65 kg/m  as calculated from the following:    Height as of  "this encounter: 1.626 m (5' 4\").    Weight as of this encounter: 125.9 kg (277 lb 9.6 oz).   Weight management plan: Discussed healthy diet and exercise guidelines      Tobacco Cessation:   reports that she has been smoking cigarettes. She has a 24.00 pack-year smoking history. She has never used smokeless tobacco.  Tobacco Cessation Action Plan: Information offered: Patient not interested at this time    Return in about 6 months (around 12/23/2022) for Chronic Disease Management, Medication review.    Paz Maria MD  [facility] [place] JONO Hahn is a 51 year old presenting for the following health issues:  URI, Lipids, Anxiety, and Asthma      HPI     Hyperlipidemia Follow-Up      Are you regularly taking any medication or supplement to lower your cholesterol?   No    Are you having muscle aches or other side effects that you think could be caused by your cholesterol lowering medication?  No    Anxiety Follow-Up    How are you doing with your anxiety since your last visit? No change    Are you having other symptoms that might be associated with anxiety? No    Have you had a significant life event? No     Are you feeling depressed? Yes:  a little    Do you have any concerns with your use of alcohol or other drugs? No    Social History     Tobacco Use     Smoking status: Current Every Day Smoker     Packs/day: 1.00     Years: 24.00     Pack years: 24.00     Types: Cigarettes     Smokeless tobacco: Never Used     Tobacco comment: Tried to Quit (NO); Passive Exposure (NO)   Substance Use Topics     Alcohol use: Yes     Comment: 3 Beers - WEEKLY     Drug use: No     LIZETT-7 SCORE 9/17/2020 9/24/2021 6/23/2022   Total Score 6 2 5     PHQ 9/17/2020 9/24/2021 6/23/2022   PHQ-9 Total Score 12 9 8   Q9: Thoughts of better off dead/self-harm past 2 weeks Not at all Not at all Not at all     Last PHQ-9 6/23/2022   1.  Little interest or pleasure in doing things 1   2.  Feeling down, depressed, " or hopeless 1   3.  Trouble falling or staying asleep, or sleeping too much 2   4.  Feeling tired or having little energy 2   5.  Poor appetite or overeating 1   6.  Feeling bad about yourself 1   7.  Trouble concentrating 0   8.  Moving slowly or restless 0   Q9: Thoughts of better off dead/self-harm past 2 weeks 0   PHQ-9 Total Score 8   Difficulty at work, home, or with people Not difficult at all     LIZETT-7  6/23/2022   1. Feeling nervous, anxious, or on edge 1   2. Not being able to stop or control worrying 1   3. Worrying too much about different things 1   4. Trouble relaxing 1   5. Being so restless that it is hard to sit still 0   6. Becoming easily annoyed or irritable 1   7. Feeling afraid, as if something awful might happen 0   LIZETT-7 Total Score 5   If you checked any problems, how difficult have they made it for you to do your work, take care of things at home, or get along with other people? Not difficult at all       Asthma Follow-Up    Was ACT completed today?    Yes    ACT Total Scores 6/23/2022   ACT TOTAL SCORE (Goal Greater than or Equal to 20) 21   In the past 12 months, how many times did you visit the emergency room for your asthma without being admitted to the hospital? 0   In the past 12 months, how many times were you hospitalized overnight because of your asthma? 0          How many days per week do you miss taking your asthma controller medication?  I do not have an asthma controller medication    Please describe any recent triggers for your asthma: smoke, upper respiratory infections, mold, strong odors and fumes and exercise or sports    Have you had any Emergency Room Visits, Urgent Care Visits, or Hospital Admissions since your last office visit?  No    Hypothyroidism Follow-up      Since last visit, patient describes the following symptoms: Weight stable, no hair loss, no skin changes, no constipation, no loose stools        Acute Illness  Acute illness concerns: URI  Onset/Duration:  "5 days  Symptoms:  Fever: YES- 99.6 pt states  Chills/Sweats: no  Headache (location?): no  Sinus Pressure: no  Conjunctivitis:  no  Ear Pain: no  Rhinorrhea: no  Congestion: YES  Sore Throat: YES  Cough: no  Wheeze: no  Decreased Appetite: no  Nausea: no  Vomiting: no  Diarrhea: no  Dysuria/Freq.: no  Dysuria or Hematuria: no  Fatigue/Achiness: YES  Sick/Strep Exposure: no  Therapies tried and outcome: ibuprofen    Patient does need refills of medications.  She is due for colon cancer screening and mammogram.  She is also due for shingles vaccine.    Review of Systems   Constitutional, HEENT, cardiovascular, pulmonary, gi and gu systems are negative, except as otherwise noted.      Objective    /74 (BP Location: Right arm, Patient Position: Sitting, Cuff Size: Adult Large)   Pulse 77   Temp 98.1  F (36.7  C) (Tympanic)   Resp 16   Ht 1.626 m (5' 4\")   Wt 125.9 kg (277 lb 9.6 oz)   SpO2 95%   BMI 47.65 kg/m    Body mass index is 47.65 kg/m .  Physical Exam   GENERAL: healthy, alert and no distress  HENT: normal cephalic/atraumatic, nose and mouth without ulcers or lesions, tonsillar erythema and tonsillar exudate  PSYCH: mentation appears normal, affect normal/bright    Rapid strep negative              .  ..  "

## 2022-06-22 NOTE — PATIENT INSTRUCTIONS
Thank you for choosing us for your care. I think an in-clinic visit would be best next steps based on your symptoms. Please schedule a clinic appointment; you won t be charged for this eVisit.      You can schedule an appointment right here in [facility], or call [phone]

## 2022-06-23 ENCOUNTER — OFFICE VISIT (OUTPATIENT)
Dept: FAMILY MEDICINE | Facility: OTHER | Age: 52
End: 2022-06-23
Attending: FAMILY MEDICINE
Payer: COMMERCIAL

## 2022-06-23 VITALS
DIASTOLIC BLOOD PRESSURE: 74 MMHG | TEMPERATURE: 98.1 F | HEART RATE: 77 BPM | SYSTOLIC BLOOD PRESSURE: 134 MMHG | BODY MASS INDEX: 47.39 KG/M2 | OXYGEN SATURATION: 95 % | HEIGHT: 64 IN | WEIGHT: 277.6 LBS | RESPIRATION RATE: 16 BRPM

## 2022-06-23 DIAGNOSIS — E03.9 ACQUIRED HYPOTHYROIDISM: ICD-10-CM

## 2022-06-23 DIAGNOSIS — J45.40 MODERATE PERSISTENT ASTHMA WITHOUT COMPLICATION: ICD-10-CM

## 2022-06-23 DIAGNOSIS — M48.061 SPINAL STENOSIS OF LUMBAR REGION, UNSPECIFIED WHETHER NEUROGENIC CLAUDICATION PRESENT: ICD-10-CM

## 2022-06-23 DIAGNOSIS — Z23 NEED FOR VACCINATION: ICD-10-CM

## 2022-06-23 DIAGNOSIS — M15.9 OSTEOARTHRITIS OF MULTIPLE JOINTS, UNSPECIFIED OSTEOARTHRITIS TYPE: ICD-10-CM

## 2022-06-23 DIAGNOSIS — L02.02 BOIL, FACE: ICD-10-CM

## 2022-06-23 DIAGNOSIS — J02.8 ACUTE BACTERIAL PHARYNGITIS: ICD-10-CM

## 2022-06-23 DIAGNOSIS — B96.89 ACUTE BACTERIAL PHARYNGITIS: ICD-10-CM

## 2022-06-23 DIAGNOSIS — Z12.31 ENCOUNTER FOR SCREENING MAMMOGRAM FOR BREAST CANCER: ICD-10-CM

## 2022-06-23 DIAGNOSIS — Z12.11 COLON CANCER SCREENING: ICD-10-CM

## 2022-06-23 DIAGNOSIS — E66.01 MORBID OBESITY (H): ICD-10-CM

## 2022-06-23 DIAGNOSIS — R07.0 THROAT PAIN: ICD-10-CM

## 2022-06-23 DIAGNOSIS — E78.5 HYPERLIPIDEMIA, UNSPECIFIED HYPERLIPIDEMIA TYPE: Primary | ICD-10-CM

## 2022-06-23 DIAGNOSIS — Q05.4 SPINA BIFIDA WITH HYDROCEPHALUS, UNSPECIFIED SPINAL REGION (H): ICD-10-CM

## 2022-06-23 DIAGNOSIS — F41.9 ANXIETY: ICD-10-CM

## 2022-06-23 LAB
ALT SERPL W P-5'-P-CCNC: 42 U/L (ref 0–50)
ANION GAP SERPL CALCULATED.3IONS-SCNC: 8 MMOL/L (ref 3–14)
BUN SERPL-MCNC: 10 MG/DL (ref 7–30)
CALCIUM SERPL-MCNC: 9.2 MG/DL (ref 8.5–10.1)
CHLORIDE BLD-SCNC: 106 MMOL/L (ref 94–109)
CHOLEST SERPL-MCNC: 138 MG/DL
CO2 SERPL-SCNC: 24 MMOL/L (ref 20–32)
CREAT SERPL-MCNC: 0.54 MG/DL (ref 0.52–1.04)
DEPRECATED S PYO AG THROAT QL EIA: NEGATIVE
FASTING STATUS PATIENT QL REPORTED: YES
GFR SERPL CREATININE-BSD FRML MDRD: >90 ML/MIN/1.73M2
GLUCOSE BLD-MCNC: 110 MG/DL (ref 70–99)
GROUP A STREP BY PCR: NOT DETECTED
HDLC SERPL-MCNC: 19 MG/DL
HOLD SPECIMEN: NORMAL
LDLC SERPL CALC-MCNC: 74 MG/DL
NONHDLC SERPL-MCNC: 119 MG/DL
POTASSIUM BLD-SCNC: 4.1 MMOL/L (ref 3.4–5.3)
SODIUM SERPL-SCNC: 138 MMOL/L (ref 133–144)
T4 FREE SERPL-MCNC: 0.82 NG/DL (ref 0.76–1.46)
TRIGL SERPL-MCNC: 226 MG/DL
TSH SERPL DL<=0.005 MIU/L-ACNC: 5.75 MU/L (ref 0.4–4)

## 2022-06-23 PROCEDURE — 36415 COLL VENOUS BLD VENIPUNCTURE: CPT | Performed by: FAMILY MEDICINE

## 2022-06-23 PROCEDURE — 80048 BASIC METABOLIC PNL TOTAL CA: CPT | Performed by: FAMILY MEDICINE

## 2022-06-23 PROCEDURE — 80061 LIPID PANEL: CPT | Performed by: FAMILY MEDICINE

## 2022-06-23 PROCEDURE — 90750 HZV VACC RECOMBINANT IM: CPT | Performed by: FAMILY MEDICINE

## 2022-06-23 PROCEDURE — 84460 ALANINE AMINO (ALT) (SGPT): CPT | Performed by: FAMILY MEDICINE

## 2022-06-23 PROCEDURE — 87651 STREP A DNA AMP PROBE: CPT | Performed by: FAMILY MEDICINE

## 2022-06-23 PROCEDURE — 84443 ASSAY THYROID STIM HORMONE: CPT | Performed by: FAMILY MEDICINE

## 2022-06-23 PROCEDURE — 99214 OFFICE O/P EST MOD 30 MIN: CPT | Mod: 25 | Performed by: FAMILY MEDICINE

## 2022-06-23 PROCEDURE — 90471 IMMUNIZATION ADMIN: CPT | Performed by: FAMILY MEDICINE

## 2022-06-23 PROCEDURE — 84439 ASSAY OF FREE THYROXINE: CPT | Performed by: FAMILY MEDICINE

## 2022-06-23 RX ORDER — CLINDAMYCIN PHOSPHATE 10 UG/ML
LOTION TOPICAL 2 TIMES DAILY
Qty: 60 ML | Refills: 1 | Status: SHIPPED | OUTPATIENT
Start: 2022-06-23 | End: 2023-12-07

## 2022-06-23 RX ORDER — IBUPROFEN 600 MG/1
600 TABLET, FILM COATED ORAL EVERY 8 HOURS PRN
Qty: 90 TABLET | Refills: 2 | Status: SHIPPED | OUTPATIENT
Start: 2022-06-23 | End: 2022-12-22

## 2022-06-23 RX ORDER — ALPRAZOLAM 0.25 MG
TABLET ORAL
Qty: 90 TABLET | OUTPATIENT
Start: 2022-06-23

## 2022-06-23 RX ORDER — PENICILLIN V POTASSIUM 500 MG/1
500 TABLET, FILM COATED ORAL 2 TIMES DAILY
Qty: 20 TABLET | Refills: 0 | Status: SHIPPED | OUTPATIENT
Start: 2022-06-23 | End: 2022-07-03

## 2022-06-23 RX ORDER — ALPRAZOLAM 0.25 MG
0.25 TABLET ORAL 3 TIMES DAILY PRN
Qty: 90 TABLET | Refills: 0 | Status: SHIPPED | OUTPATIENT
Start: 2022-06-23 | End: 2022-07-21

## 2022-06-23 ASSESSMENT — ANXIETY QUESTIONNAIRES
1. FEELING NERVOUS, ANXIOUS, OR ON EDGE: SEVERAL DAYS
2. NOT BEING ABLE TO STOP OR CONTROL WORRYING: SEVERAL DAYS
6. BECOMING EASILY ANNOYED OR IRRITABLE: SEVERAL DAYS
5. BEING SO RESTLESS THAT IT IS HARD TO SIT STILL: NOT AT ALL
GAD7 TOTAL SCORE: 5
IF YOU CHECKED OFF ANY PROBLEMS ON THIS QUESTIONNAIRE, HOW DIFFICULT HAVE THESE PROBLEMS MADE IT FOR YOU TO DO YOUR WORK, TAKE CARE OF THINGS AT HOME, OR GET ALONG WITH OTHER PEOPLE: NOT DIFFICULT AT ALL
7. FEELING AFRAID AS IF SOMETHING AWFUL MIGHT HAPPEN: NOT AT ALL
4. TROUBLE RELAXING: SEVERAL DAYS
3. WORRYING TOO MUCH ABOUT DIFFERENT THINGS: SEVERAL DAYS
GAD7 TOTAL SCORE: 5

## 2022-06-23 ASSESSMENT — PAIN SCALES - GENERAL: PAINLEVEL: MODERATE PAIN (5)

## 2022-06-23 ASSESSMENT — ASTHMA QUESTIONNAIRES
QUESTION_4 LAST FOUR WEEKS HOW OFTEN HAVE YOU USED YOUR RESCUE INHALER OR NEBULIZER MEDICATION (SUCH AS ALBUTEROL): ONCE A WEEK OR LESS
QUESTION_3 LAST FOUR WEEKS HOW OFTEN DID YOUR ASTHMA SYMPTOMS (WHEEZING, COUGHING, SHORTNESS OF BREATH, CHEST TIGHTNESS OR PAIN) WAKE YOU UP AT NIGHT OR EARLIER THAN USUAL IN THE MORNING: NOT AT ALL
QUESTION_5 LAST FOUR WEEKS HOW WOULD YOU RATE YOUR ASTHMA CONTROL: WELL CONTROLLED
QUESTION_2 LAST FOUR WEEKS HOW OFTEN HAVE YOU HAD SHORTNESS OF BREATH: ONCE OR TWICE A WEEK
ACT_TOTALSCORE: 21
QUESTION_1 LAST FOUR WEEKS HOW MUCH OF THE TIME DID YOUR ASTHMA KEEP YOU FROM GETTING AS MUCH DONE AT WORK, SCHOOL OR AT HOME: A LITTLE OF THE TIME
ACT_TOTALSCORE: 21

## 2022-06-23 ASSESSMENT — PATIENT HEALTH QUESTIONNAIRE - PHQ9: SUM OF ALL RESPONSES TO PHQ QUESTIONS 1-9: 8

## 2022-06-23 NOTE — LETTER
My Asthma Action Plan    Name: Selma Dubose   YOB: 1970  Date: 6/23/2022   My doctor: Paz Maria MD   My clinic: [facility]        My Rescue Medicine:   Albuterol inhaler (Proair/Ventolin/Proventil HFA)  2-4 puffs EVERY 4 HOURS as needed. Use a spacer if recommended by your provider.   My Asthma Severity:   Intermittent / Exercise Induced  Know your asthma triggers: smoke, upper respiratory infections and mold  smoke  upper respiratory infections  mold  strong odors and fumes  exercise or sports          GREEN ZONE   Good Control    I feel good    No cough or wheeze    Can work, sleep and play without asthma symptoms       Take your asthma control medicine every day.     1. If exercise triggers your asthma, take your rescue medication    15 minutes before exercise or sports, and    During exercise if you have asthma symptoms  2. Spacer to use with inhaler: If you have a spacer, make sure to use it with your inhaler             YELLOW ZONE Getting Worse  I have ANY of these:    I do not feel good    Cough or wheeze    Chest feels tight    Wake up at night   1. Keep taking your Green Zone medications  2. Start taking your rescue medicine:    every 20 minutes for up to 1 hour. Then every 4 hours for 24-48 hours.  3. If you stay in the Yellow Zone for more than 12-24 hours, contact your doctor.  4. If you do not return to the Green Zone in 12-24 hours or you get worse, start taking your oral steroid medicine if prescribed by your provider.           RED ZONE Medical Alert - Get Help  I have ANY of these:    I feel awful    Medicine is not helping    Breathing getting harder    Trouble walking or talking    Nose opens wide to breathe       1. Take your rescue medicine NOW  2. If your provider has prescribed an oral steroid medicine, start taking it NOW  3. Call your doctor NOW  4. If you are still in the Red Zone after 20 minutes and you have not reached your  doctor:    Take your rescue medicine again and    Call 911 or go to the emergency room right away    See your regular doctor within 2 weeks of an Emergency Room or Urgent Care visit for follow-up treatment.          Annual Reminders:  Meet with Asthma Educator,  Flu Shot in the Fall, consider Pneumonia Vaccination for patients with asthma (aged 19 and older).    Pharmacy:    [facility]  FOR [facility] - [address]  [facility] - [address]  [id number] - [address]    Electronically signed by Paz Maria MD   Date: 06/23/22                    Asthma Triggers  How To Control Things That Make Your Asthma Worse    Triggers are things that make your asthma worse.  Look at the list below to help you find your triggers and   what you can do about them. You can help prevent asthma flare-ups by staying away from your triggers.      Trigger                                                          What you can do   Cigarette Smoke  Tobacco smoke can make asthma worse. Do not allow smoking in your home, car or around you.  Be sure no one smokes at a child s day care or school.  If you smoke, ask your health care provider for ways to help you quit.  Ask family members to quit too.  Ask your health care provider for a referral to Quit Plan to help you quit smoking, or call 5-269-463-PLAN.     Colds, Flu, Bronchitis  These are common triggers of asthma. Wash your hands often.  Don t touch your eyes, nose or mouth.  Get a flu shot every year.     Dust Mites  These are tiny bugs that live in cloth or carpet. They are too small to see. Wash sheets and blankets in hot water every week.   Encase pillows and mattress in dust mite proof covers.  Avoid having carpet if you can. If you have carpet, vacuum weekly.   Use a dust mask and HEPA vacuum.   Pollen and Outdoor Mold  Some people are allergic to trees, grass, or weed  pollen, or molds. Try to keep your windows closed.  Limit time out doors when pollen count is high.   Ask you health care provider about taking medicine during allergy season.     Animal Dander  Some people are allergic to skin flakes, urine or saliva from pets with fur or feathers. Keep pets with fur or feathers out of your home.    If you can t keep the pet outdoors, then keep the pet out of your bedroom.  Keep the bedroom door closed.  Keep pets off cloth furniture and away from stuffed toys.     Mice, Rats, and Cockroaches  Some people are allergic to the waste from these pests.   Cover food and garbage.  Clean up spills and food crumbs.  Store grease in the refrigerator.   Keep food out of the bedroom.   Indoor Mold  This can be a trigger if your home has high moisture. Fix leaking faucets, pipes, or other sources of water.   Clean moldy surfaces.  Dehumidify basement if it is damp and smelly.   Smoke, Strong Odors, and Sprays  These can reduce air quality. Stay away from strong odors and sprays, such as perfume, powder, hair spray, paints, smoke incense, paint, cleaning products, candles and new carpet.   Exercise or Sports  Some people with asthma have this trigger. Be active!  Ask your doctor about taking medicine before sports or exercise to prevent symptoms.    Warm up for 5-10 minutes before and after sports or exercise.     Other Triggers of Asthma  Cold air:  Cover your nose and mouth with a scarf.  Sometimes laughing or crying can be a trigger.  Some medicines and food can trigger asthma.

## 2022-06-23 NOTE — NURSING NOTE
"Chief Complaint   Patient presents with     URI     Lipids     Anxiety     Asthma       Initial /74 (BP Location: Right arm, Patient Position: Sitting, Cuff Size: Adult Large)   Pulse 77   Temp 98.1  F (36.7  C) (Tympanic)   Resp 16   Ht 1.626 m (5' 4\")   Wt 125.9 kg (277 lb 9.6 oz)   SpO2 95%   BMI 47.65 kg/m   Estimated body mass index is 47.65 kg/m  as calculated from the following:    Height as of this encounter: 1.626 m (5' 4\").    Weight as of this encounter: 125.9 kg (277 lb 9.6 oz).  Medication Reconciliation: complete  Nadeen Hernández MA  "

## 2022-06-24 NOTE — TELEPHONE ENCOUNTER
XANAX JUST FILLED      Last Written Prescription Date:  6-  Last Fill Quantity: 90,   # refills: 0

## 2022-06-27 RX ORDER — ALPRAZOLAM 0.25 MG
TABLET ORAL
Qty: 90 TABLET | Refills: 0 | OUTPATIENT
Start: 2022-06-27

## 2022-07-20 DIAGNOSIS — F41.9 ANXIETY: ICD-10-CM

## 2022-07-21 RX ORDER — ALPRAZOLAM 0.25 MG
TABLET ORAL
Qty: 90 TABLET | Refills: 0 | Status: SHIPPED | OUTPATIENT
Start: 2022-07-21 | End: 2022-08-23

## 2022-07-21 NOTE — TELEPHONE ENCOUNTER
Alprazolam      Last Written Prescription Date:  6/23/22  Last Fill Quantity: 90,   # refills: 0  Last Office Visit: 6/23/22  Future Office visit:

## 2022-08-19 DIAGNOSIS — Z87.898 HISTORY OF TACHYCARDIA: ICD-10-CM

## 2022-08-22 DIAGNOSIS — F41.9 ANXIETY: ICD-10-CM

## 2022-08-22 RX ORDER — DILTIAZEM HYDROCHLORIDE 240 MG/1
CAPSULE, EXTENDED RELEASE ORAL
Qty: 90 CAPSULE | Refills: 0 | Status: SHIPPED | OUTPATIENT
Start: 2022-08-22 | End: 2022-12-06

## 2022-08-22 NOTE — TELEPHONE ENCOUNTER
tiadylt      Last Written Prescription Date:  5/31/22  Last Fill Quantity: 90,   # refills: 0  Last Office Visit: 6/23/22  Future Office visit:

## 2022-08-23 RX ORDER — ALPRAZOLAM 0.25 MG
TABLET ORAL
Qty: 90 TABLET | Refills: 1 | Status: SHIPPED | OUTPATIENT
Start: 2022-08-23 | End: 2022-10-20

## 2022-08-23 NOTE — TELEPHONE ENCOUNTER
Xanax 0.25mg      Last Written Prescription Date:  7/21/22  Last Fill Quantity: 90,   # refills: 0  Last Office Visit: 6/23/22  Future Office visit:       Routing refill request to provider for review/approval because:  Drug not on the [facility], [facility] or [facility] refill protocol or controlled substance

## 2022-10-19 DIAGNOSIS — F41.9 ANXIETY: ICD-10-CM

## 2022-10-19 DIAGNOSIS — R06.02 SOB (SHORTNESS OF BREATH): ICD-10-CM

## 2022-10-20 RX ORDER — ALBUTEROL SULFATE 90 UG/1
AEROSOL, METERED RESPIRATORY (INHALATION)
Qty: 18 G | Refills: 0 | Status: SHIPPED | OUTPATIENT
Start: 2022-10-20 | End: 2022-11-11

## 2022-10-20 RX ORDER — ALPRAZOLAM 0.25 MG
TABLET ORAL
Qty: 90 TABLET | Refills: 1 | Status: SHIPPED | OUTPATIENT
Start: 2022-10-20 | End: 2022-12-27

## 2022-10-20 NOTE — TELEPHONE ENCOUNTER
ALPRAZolam (XANAX) 0.25 MG tablet        Last Written Prescription Date:  8/23/22  Last Fill Quantity: 90,   # refills: 1  Last Office Visit: 6/23/22  Future Office visit:       Routing refill request to provider for review/approval because:    Drug not on the [facility], [facility] or [facility] refill protocol or controlled substance    albuterol (PROAIR HFA/PROVENTIL HFA/VENTOLIN HFA) 108 (90 Base) MCG/ACT inhaler        Last Written Prescription Date:  6/30/21  Last Fill Quantity: 18 g,   # refills: 0  Last Office Visit: 6/23/22  Future Office visit:       Routing refill request to provider for review/approval because:  Last refill > 1 year ago - sending for review

## 2022-11-10 DIAGNOSIS — R06.02 SOB (SHORTNESS OF BREATH): ICD-10-CM

## 2022-11-11 RX ORDER — ALBUTEROL SULFATE 90 UG/1
AEROSOL, METERED RESPIRATORY (INHALATION)
Qty: 18 G | Refills: 1 | Status: SHIPPED | OUTPATIENT
Start: 2022-11-11 | End: 2024-01-30

## 2022-11-20 ENCOUNTER — HEALTH MAINTENANCE LETTER (OUTPATIENT)
Age: 52
End: 2022-11-20

## 2022-12-01 DIAGNOSIS — Z87.898 HISTORY OF TACHYCARDIA: ICD-10-CM

## 2022-12-05 NOTE — TELEPHONE ENCOUNTER
Tiadylt       Last Written Prescription Date:  8/22/22  Last Fill Quantity: 90,   # refills: 0  Last Office Visit: 6/23/22  Future Office visit:

## 2022-12-06 ENCOUNTER — MYC REFILL (OUTPATIENT)
Dept: FAMILY MEDICINE | Facility: OTHER | Age: 52
End: 2022-12-06

## 2022-12-06 DIAGNOSIS — Z87.898 HISTORY OF TACHYCARDIA: ICD-10-CM

## 2022-12-06 RX ORDER — DILTIAZEM HYDROCHLORIDE 240 MG/1
CAPSULE, EXTENDED RELEASE ORAL
Qty: 90 CAPSULE | Refills: 0 | Status: SHIPPED | OUTPATIENT
Start: 2022-12-06 | End: 2023-03-06

## 2022-12-06 RX ORDER — DILTIAZEM HYDROCHLORIDE 240 MG/1
240 CAPSULE, EXTENDED RELEASE ORAL DAILY
Qty: 90 CAPSULE | Refills: 0 | Status: SHIPPED | OUTPATIENT
Start: 2022-12-06 | End: 2023-02-13

## 2022-12-20 DIAGNOSIS — M15.9 OSTEOARTHRITIS OF MULTIPLE JOINTS, UNSPECIFIED OSTEOARTHRITIS TYPE: ICD-10-CM

## 2022-12-21 NOTE — TELEPHONE ENCOUNTER
Fialed protocol    AST   Date Value Ref Range Status   08/14/2017 29 0 - 45 U/L Final       ibuprofen (ADVIL/MOTRIN) 600 MG tablet      Last Written Prescription Date:  6/23/22  Last Fill Quantity: 90,   # refills: 2  Last Office Visit: 6/23/22  Future Office visit:       Routing refill request to provider for review/approval because:  Drug not on the [facility], [facility] or  Health refill protocol or controlled substance

## 2022-12-22 RX ORDER — IBUPROFEN 600 MG/1
600 TABLET, FILM COATED ORAL EVERY 8 HOURS PRN
Qty: 90 TABLET | Refills: 2 | Status: SHIPPED | OUTPATIENT
Start: 2022-12-22 | End: 2023-02-13

## 2022-12-23 DIAGNOSIS — F41.9 ANXIETY: ICD-10-CM

## 2022-12-27 ENCOUNTER — MYC MEDICAL ADVICE (OUTPATIENT)
Dept: FAMILY MEDICINE | Facility: OTHER | Age: 52
End: 2022-12-27

## 2022-12-27 DIAGNOSIS — F41.9 ANXIETY: ICD-10-CM

## 2022-12-27 NOTE — TELEPHONE ENCOUNTER
ALPRAZolam (XANAX) 0.25 MG tablet      Last Written Prescription Date:  10-20-22  Last Fill Quantity: 90,   # refills: 1  Last Office Visit: 6-23-22  Future Office visit:       Routing refill request to provider for review/approval because:  Drug not on the [facility], [facility] or [facility] refill protocol or controlled substance

## 2022-12-28 RX ORDER — ALPRAZOLAM 0.25 MG
0.25 TABLET ORAL 3 TIMES DAILY PRN
Qty: 90 TABLET | Refills: 0 | Status: SHIPPED | OUTPATIENT
Start: 2022-12-28 | End: 2023-02-13

## 2023-01-02 ENCOUNTER — E-VISIT (OUTPATIENT)
Dept: FAMILY MEDICINE | Facility: OTHER | Age: 53
End: 2023-01-02
Attending: FAMILY MEDICINE
Payer: COMMERCIAL

## 2023-01-02 DIAGNOSIS — Z53.9 ERRONEOUS ENCOUNTER--DISREGARD: Primary | ICD-10-CM

## 2023-01-03 NOTE — PATIENT INSTRUCTIONS
Thank you for choosing us for your care. I think an in-clinic visit would be best next steps based on your symptoms. Please schedule a clinic appointment; you won t be charged for this eVisit.      You can schedule an appointment right here in [facility], or call [phone]

## 2023-01-05 DIAGNOSIS — F41.9 ANXIETY: ICD-10-CM

## 2023-01-05 RX ORDER — ALPRAZOLAM 0.25 MG
0.25 TABLET ORAL 3 TIMES DAILY PRN
Qty: 90 TABLET | Refills: 0 | Status: CANCELLED | OUTPATIENT
Start: 2023-01-05

## 2023-01-05 RX ORDER — ALPRAZOLAM 0.25 MG
TABLET ORAL
Qty: 90 TABLET | Refills: 1 | OUTPATIENT
Start: 2023-01-05

## 2023-01-05 NOTE — TELEPHONE ENCOUNTER
alprazolam    Pharmacy change  Last Written Prescription Date:  12/28/22  Last Fill Quantity: 90,   # refills: 0  Last Office Visit: 4/23/22  Future Office visit:    Next 5 appointments (look out 90 days)    Jan 24, 2023  2:00 PM  (Arrive by 1:45 PM)  SHORT with Paz Maria MD  [facility] ([facility] ) [address]  [place] [address]  [phone]

## 2023-02-13 ENCOUNTER — OFFICE VISIT (OUTPATIENT)
Dept: FAMILY MEDICINE | Facility: OTHER | Age: 53
End: 2023-02-13
Attending: FAMILY MEDICINE
Payer: COMMERCIAL

## 2023-02-13 VITALS
BODY MASS INDEX: 47.29 KG/M2 | HEIGHT: 64 IN | TEMPERATURE: 97.3 F | SYSTOLIC BLOOD PRESSURE: 138 MMHG | OXYGEN SATURATION: 97 % | HEART RATE: 79 BPM | WEIGHT: 277 LBS | DIASTOLIC BLOOD PRESSURE: 88 MMHG

## 2023-02-13 DIAGNOSIS — Z23 NEED FOR VACCINATION: ICD-10-CM

## 2023-02-13 DIAGNOSIS — F33.1 MODERATE RECURRENT MAJOR DEPRESSION (H): ICD-10-CM

## 2023-02-13 DIAGNOSIS — J45.40 MODERATE PERSISTENT ASTHMA WITHOUT COMPLICATION: ICD-10-CM

## 2023-02-13 DIAGNOSIS — M54.42 CHRONIC BILATERAL LOW BACK PAIN WITH BILATERAL SCIATICA: ICD-10-CM

## 2023-02-13 DIAGNOSIS — M54.17 LUMBOSACRAL RADICULOPATHY AT S1: ICD-10-CM

## 2023-02-13 DIAGNOSIS — Q05.4 SPINA BIFIDA WITH HYDROCEPHALUS, UNSPECIFIED SPINAL REGION (H): ICD-10-CM

## 2023-02-13 DIAGNOSIS — Z12.11 COLON CANCER SCREENING: ICD-10-CM

## 2023-02-13 DIAGNOSIS — E78.5 HYPERLIPIDEMIA, UNSPECIFIED HYPERLIPIDEMIA TYPE: Primary | ICD-10-CM

## 2023-02-13 DIAGNOSIS — E66.01 MORBID OBESITY (H): ICD-10-CM

## 2023-02-13 DIAGNOSIS — Z72.0 TOBACCO ABUSE: ICD-10-CM

## 2023-02-13 DIAGNOSIS — E03.9 ACQUIRED HYPOTHYROIDISM: ICD-10-CM

## 2023-02-13 DIAGNOSIS — M25.561 CHRONIC PAIN OF BOTH KNEES: ICD-10-CM

## 2023-02-13 DIAGNOSIS — G89.29 CHRONIC PAIN OF BOTH KNEES: ICD-10-CM

## 2023-02-13 DIAGNOSIS — M47.26 OSTEOARTHRITIS OF SPINE WITH RADICULOPATHY, LUMBAR REGION: ICD-10-CM

## 2023-02-13 DIAGNOSIS — M25.562 CHRONIC PAIN OF BOTH KNEES: ICD-10-CM

## 2023-02-13 DIAGNOSIS — F41.9 ANXIETY: ICD-10-CM

## 2023-02-13 DIAGNOSIS — M54.41 CHRONIC BILATERAL LOW BACK PAIN WITH BILATERAL SCIATICA: ICD-10-CM

## 2023-02-13 DIAGNOSIS — G89.29 CHRONIC BILATERAL LOW BACK PAIN WITH BILATERAL SCIATICA: ICD-10-CM

## 2023-02-13 DIAGNOSIS — M54.42 MIDLINE LOW BACK PAIN WITH LEFT-SIDED SCIATICA, UNSPECIFIED CHRONICITY: ICD-10-CM

## 2023-02-13 LAB
ALT SERPL W P-5'-P-CCNC: 46 U/L (ref 10–35)
ANION GAP SERPL CALCULATED.3IONS-SCNC: 12 MMOL/L (ref 7–15)
BUN SERPL-MCNC: 8.2 MG/DL (ref 6–20)
CALCIUM SERPL-MCNC: 9.5 MG/DL (ref 8.6–10)
CHLORIDE SERPL-SCNC: 99 MMOL/L (ref 98–107)
CHOLEST SERPL-MCNC: 177 MG/DL
CREAT SERPL-MCNC: 0.65 MG/DL (ref 0.51–0.95)
DEPRECATED HCO3 PLAS-SCNC: 27 MMOL/L (ref 22–29)
ERYTHROCYTE [DISTWIDTH] IN BLOOD BY AUTOMATED COUNT: 13.9 % (ref 10–15)
GFR SERPL CREATININE-BSD FRML MDRD: >90 ML/MIN/1.73M2
GLUCOSE SERPL-MCNC: 104 MG/DL (ref 70–99)
HCT VFR BLD AUTO: 45.5 % (ref 35–47)
HDLC SERPL-MCNC: 27 MG/DL
HGB BLD-MCNC: 15.3 G/DL (ref 11.7–15.7)
LDLC SERPL CALC-MCNC: 98 MG/DL
MCH RBC QN AUTO: 32 PG (ref 26.5–33)
MCHC RBC AUTO-ENTMCNC: 33.6 G/DL (ref 31.5–36.5)
MCV RBC AUTO: 95 FL (ref 78–100)
NONHDLC SERPL-MCNC: 150 MG/DL
PLATELET # BLD AUTO: 354 10E3/UL (ref 150–450)
POTASSIUM SERPL-SCNC: 4.1 MMOL/L (ref 3.4–5.3)
RBC # BLD AUTO: 4.78 10E6/UL (ref 3.8–5.2)
SODIUM SERPL-SCNC: 138 MMOL/L (ref 136–145)
T4 FREE SERPL-MCNC: 0.79 NG/DL (ref 0.9–1.7)
TRIGL SERPL-MCNC: 259 MG/DL
TSH SERPL DL<=0.005 MIU/L-ACNC: 5.67 UIU/ML (ref 0.3–4.2)
WBC # BLD AUTO: 9 10E3/UL (ref 4–11)

## 2023-02-13 PROCEDURE — 84443 ASSAY THYROID STIM HORMONE: CPT | Mod: ZL | Performed by: FAMILY MEDICINE

## 2023-02-13 PROCEDURE — 85027 COMPLETE CBC AUTOMATED: CPT | Mod: ZL | Performed by: FAMILY MEDICINE

## 2023-02-13 PROCEDURE — G0463 HOSPITAL OUTPT CLINIC VISIT: HCPCS | Mod: 25

## 2023-02-13 PROCEDURE — 99214 OFFICE O/P EST MOD 30 MIN: CPT | Performed by: FAMILY MEDICINE

## 2023-02-13 PROCEDURE — 80061 LIPID PANEL: CPT | Mod: ZL | Performed by: FAMILY MEDICINE

## 2023-02-13 PROCEDURE — 84439 ASSAY OF FREE THYROXINE: CPT | Mod: ZL | Performed by: FAMILY MEDICINE

## 2023-02-13 PROCEDURE — 82310 ASSAY OF CALCIUM: CPT | Mod: ZL | Performed by: FAMILY MEDICINE

## 2023-02-13 PROCEDURE — G0463 HOSPITAL OUTPT CLINIC VISIT: HCPCS

## 2023-02-13 PROCEDURE — 84460 ALANINE AMINO (ALT) (SGPT): CPT | Mod: ZL | Performed by: FAMILY MEDICINE

## 2023-02-13 PROCEDURE — 36415 COLL VENOUS BLD VENIPUNCTURE: CPT | Mod: ZL | Performed by: FAMILY MEDICINE

## 2023-02-13 PROCEDURE — 90750 HZV VACC RECOMBINANT IM: CPT

## 2023-02-13 RX ORDER — BUPROPION HYDROCHLORIDE 150 MG/1
150 TABLET ORAL EVERY MORNING
Qty: 30 TABLET | Refills: 2 | Status: SHIPPED | OUTPATIENT
Start: 2023-02-13 | End: 2023-09-14

## 2023-02-13 RX ORDER — ALPRAZOLAM 0.25 MG
0.25 TABLET ORAL 3 TIMES DAILY PRN
Qty: 90 TABLET | Refills: 2 | Status: SHIPPED | OUTPATIENT
Start: 2023-02-13 | End: 2023-05-12

## 2023-02-13 RX ORDER — CYCLOBENZAPRINE HCL 10 MG
10 TABLET ORAL 2 TIMES DAILY PRN
Qty: 60 TABLET | Refills: 3 | Status: SHIPPED | OUTPATIENT
Start: 2023-02-13 | End: 2023-09-14

## 2023-02-13 RX ORDER — DICLOFENAC SODIUM 75 MG/1
75 TABLET, DELAYED RELEASE ORAL 2 TIMES DAILY PRN
Qty: 60 TABLET | Refills: 2 | Status: SHIPPED | OUTPATIENT
Start: 2023-02-13 | End: 2023-09-14

## 2023-02-13 ASSESSMENT — ASTHMA QUESTIONNAIRES
ACT_TOTALSCORE: 25
QUESTION_5 LAST FOUR WEEKS HOW WOULD YOU RATE YOUR ASTHMA CONTROL: COMPLETELY CONTROLLED
QUESTION_2 LAST FOUR WEEKS HOW OFTEN HAVE YOU HAD SHORTNESS OF BREATH: NOT AT ALL
QUESTION_3 LAST FOUR WEEKS HOW OFTEN DID YOUR ASTHMA SYMPTOMS (WHEEZING, COUGHING, SHORTNESS OF BREATH, CHEST TIGHTNESS OR PAIN) WAKE YOU UP AT NIGHT OR EARLIER THAN USUAL IN THE MORNING: NOT AT ALL
QUESTION_1 LAST FOUR WEEKS HOW MUCH OF THE TIME DID YOUR ASTHMA KEEP YOU FROM GETTING AS MUCH DONE AT WORK, SCHOOL OR AT HOME: NONE OF THE TIME
ACT_TOTALSCORE: 25
QUESTION_4 LAST FOUR WEEKS HOW OFTEN HAVE YOU USED YOUR RESCUE INHALER OR NEBULIZER MEDICATION (SUCH AS ALBUTEROL): NOT AT ALL

## 2023-02-13 ASSESSMENT — ANXIETY QUESTIONNAIRES
6. BECOMING EASILY ANNOYED OR IRRITABLE: SEVERAL DAYS
GAD7 TOTAL SCORE: 2
IF YOU CHECKED OFF ANY PROBLEMS ON THIS QUESTIONNAIRE, HOW DIFFICULT HAVE THESE PROBLEMS MADE IT FOR YOU TO DO YOUR WORK, TAKE CARE OF THINGS AT HOME, OR GET ALONG WITH OTHER PEOPLE: NOT DIFFICULT AT ALL
7. FEELING AFRAID AS IF SOMETHING AWFUL MIGHT HAPPEN: NOT AT ALL
3. WORRYING TOO MUCH ABOUT DIFFERENT THINGS: NOT AT ALL
5. BEING SO RESTLESS THAT IT IS HARD TO SIT STILL: NOT AT ALL
1. FEELING NERVOUS, ANXIOUS, OR ON EDGE: SEVERAL DAYS
GAD7 TOTAL SCORE: 2
2. NOT BEING ABLE TO STOP OR CONTROL WORRYING: NOT AT ALL
4. TROUBLE RELAXING: NOT AT ALL

## 2023-02-13 ASSESSMENT — PATIENT HEALTH QUESTIONNAIRE - PHQ9: SUM OF ALL RESPONSES TO PHQ QUESTIONS 1-9: 13

## 2023-02-13 ASSESSMENT — PAIN SCALES - GENERAL: PAINLEVEL: MODERATE PAIN (5)

## 2023-02-13 NOTE — PROGRESS NOTES
Assessment & Plan     1. Hyperlipidemia, unspecified hyperlipidemia type  Labs updated, will make recommendations based on results.  Follow-up in six months, sooner as needed.  - Lipid Profile (Chol, Trig, HDL, LDL calc); Future  - CBC with platelets; Future  - Basic metabolic panel; Future  - ALT; Future  - Lipid Profile (Chol, Trig, HDL, LDL calc)  - CBC with platelets  - Basic metabolic panel  - ALT    2. Moderate recurrent major depression (H)  Will start wellbutrin to help with symptoms, this may help with smoking as well  - buPROPion (WELLBUTRIN XL) 150 MG 24 hr tablet; Take 1 tablet (150 mg) by mouth every morning  Dispense: 30 tablet; Refill: 2    3. Anxiety  As above  - buPROPion (WELLBUTRIN XL) 150 MG 24 hr tablet; Take 1 tablet (150 mg) by mouth every morning  Dispense: 30 tablet; Refill: 2  - ALPRAZolam (XANAX) 0.25 MG tablet; Take 1 tablet (0.25 mg) by mouth 3 times daily as needed for anxiety  Dispense: 90 tablet; Refill: 2    4. Acquired hypothyroidism  Updated  - TSH with free T4 reflex; Future  - TSH with free T4 reflex  - T4 free    5. Chronic bilateral low back pain with bilateral sciatica  Patient wonders about alternatives to ibuprofen, will try voltaren  - diclofenac (VOLTAREN) 75 MG EC tablet; Take 1 tablet (75 mg) by mouth 2 times daily as needed for moderate pain (4-6)  Dispense: 60 tablet; Refill: 2    6. Spina bifida with hydrocephalus, unspecified spinal region (H)  No changes    7. Osteoarthritis of spine with radiculopathy, lumbar region  Refilled  - cyclobenzaprine (FLEXERIL) 10 MG tablet; Take 1 tablet (10 mg) by mouth 2 times daily as needed for muscle spasms  Dispense: 60 tablet; Refill: 3    8. Lumbosacral radiculopathy at S1  - cyclobenzaprine (FLEXERIL) 10 MG tablet; Take 1 tablet (10 mg) by mouth 2 times daily as needed for muscle spasms  Dispense: 60 tablet; Refill: 3    9. Midline low back pain with left-sided sciatica, unspecified chronicity  - cyclobenzaprine (FLEXERIL)  "10 MG tablet; Take 1 tablet (10 mg) by mouth 2 times daily as needed for muscle spasms  Dispense: 60 tablet; Refill: 3    10. Moderate persistent asthma without complication  No changes    11. Chronic pain of both knees  Referral ordered  - Orthopedic  Referral; Future    12. Tobacco abuse  - buPROPion (WELLBUTRIN XL) 150 MG 24 hr tablet; Take 1 tablet (150 mg) by mouth every morning  Dispense: 30 tablet; Refill: 2    13. Colon cancer screening  Ordered  - COLOGUARD([employer]); Future    14. Need for vaccination  Updated  - ZOSTER VACCINE RECOMBINANT ADJUVANTED ([employer])    15. Morbid obesity (H)          BMI:   Estimated body mass index is 47.55 kg/m  as calculated from the following:    Height as of this encounter: 1.626 m (5' 4\").    Weight as of this encounter: 125.6 kg (277 lb).       Depression Screening Follow Up    PHQ 2/13/2023   PHQ-9 Total Score 13   Q9: Thoughts of better off dead/self-harm past 2 weeks Not at all       Follow Up Actions Taken  Patient counseled, no additional follow up at this time.       Return in about 6 months (around 8/13/2023) for Chronic Disease Management, Medication review.    Paz Maria MD  [facility] - [facility] Hahn is a 52 year old presenting for the following health issues:  Lipids, Thyroid Problem, Depression, and Musculoskeletal Problem      HPI     Hyperlipidemia Follow-Up      Are you regularly taking any medication or supplement to lower your cholesterol?   No    Are you having muscle aches or other side effects that you think could be caused by your cholesterol lowering medication?  No    Depression and Anxiety Follow-Up    How are you doing with your depression since your last visit? Worsened -feeling down    How are you doing with your anxiety since your last visit?  No change    Are you having other symptoms that might be associated with depression or anxiety? No    Have you had a significant life event? " No     Do you have any concerns with your use of alcohol or other drugs? No    Social History     Tobacco Use     Smoking status: Every Day     Packs/day: 1.00     Years: 24.00     Pack years: 24.00     Types: Cigarettes     Smokeless tobacco: Never     Tobacco comments:     Tried to Quit (NO); Passive Exposure (NO)   Substance Use Topics     Alcohol use: Yes     Comment: 3 Beers - WEEKLY     Drug use: No     PHQ 9/24/2021 6/23/2022 2/13/2023   PHQ-9 Total Score 9 8 13   Q9: Thoughts of better off dead/self-harm past 2 weeks Not at all Not at all Not at all     LIZETT-7 SCORE 9/24/2021 6/23/2022 2/13/2023   Total Score 2 5 2     Last PHQ-9 2/13/2023   1.  Little interest or pleasure in doing things 2   2.  Feeling down, depressed, or hopeless 2   3.  Trouble falling or staying asleep, or sleeping too much 2   4.  Feeling tired or having little energy 3   5.  Poor appetite or overeating 1   6.  Feeling bad about yourself 2   7.  Trouble concentrating 1   8.  Moving slowly or restless 0   Q9: Thoughts of better off dead/self-harm past 2 weeks 0   PHQ-9 Total Score 13   Difficulty at work, home, or with people Not difficult at all     LIZETT-7  2/13/2023   1. Feeling nervous, anxious, or on edge 1   2. Not being able to stop or control worrying 0   3. Worrying too much about different things 0   4. Trouble relaxing 0   5. Being so restless that it is hard to sit still 0   6. Becoming easily annoyed or irritable 1   7. Feeling afraid, as if something awful might happen 0   LIZETT-7 Total Score 2   If you checked any problems, how difficult have they made it for you to do your work, take care of things at home, or get along with other people? Not difficult at all       Suicide Assessment Five-step Evaluation and Treatment (SAFE-T)    Hypothyroidism Follow-up      Since last visit, patient describes the following symptoms: Weight stable, no hair loss, no skin changes, no constipation, no loose stools, anxiety and  "depression    Chronic/Recurring Back Pain Follow Up      Where is your back pain located? (Select all that apply) low back bilateral    How would you describe your back pain?  dull ache    Where does your back pain spread? the right and left buttock and the right and left  knee    Since your last clinic visit for back pain, how has your pain changed? gradually worsening    Does your back pain interfere with your job? No    Since your last visit, have you tried any new treatment? No    Asthma Follow-Up    Was ACT completed today?    Yes    ACT Total Scores 2/13/2023   ACT TOTAL SCORE (Goal Greater than or Equal to 20) 25   In the past 12 months, how many times did you visit the emergency room for your asthma without being admitted to the hospital? 0   In the past 12 months, how many times were you hospitalized overnight because of your asthma? 0          How many days per week do you miss taking your asthma controller medication?  I do not have an asthma controller medication    Please describe any recent triggers for your asthma: None    Have you had any Emergency Room Visits, Urgent Care Visits, or Hospital Admissions since your last office visit?  No      Concern - Knee pain  Onset: long time  Description: ongoing knee pain  Intensity: moderate  Progression of Symptoms:  constant  Accompanying Signs & Symptoms: ongoing back symptoms  Precipitating factors:        Worsened by: walking, activity  Alleviating factors:        Improved by: rest  Therapies tried and outcome: medication, activity is limited due to knee and back pain      Review of Systems   Constitutional, HEENT, cardiovascular, pulmonary, gi and gu systems are negative, except as otherwise noted.      Objective    /88 (BP Location: Right arm, Patient Position: Sitting, Cuff Size: Adult Regular)   Pulse 79   Temp 97.3  F (36.3  C) (Tympanic)   Ht 1.626 m (5' 4\")   Wt 125.6 kg (277 lb)   SpO2 97%   BMI 47.55 kg/m    Body mass index is 47.55 " kg/m .  Physical Exam   GENERAL: alert and no distress  PSYCH: mentation appears normal, affect normal/bright

## 2023-02-14 DIAGNOSIS — E03.9 HYPOTHYROIDISM, UNSPECIFIED TYPE: ICD-10-CM

## 2023-02-14 RX ORDER — LEVOTHYROXINE SODIUM 125 UG/1
125 TABLET ORAL DAILY
Qty: 30 TABLET | Refills: 3 | Status: SHIPPED | OUTPATIENT
Start: 2023-02-14 | End: 2023-09-14

## 2023-03-03 DIAGNOSIS — Z87.898 HISTORY OF TACHYCARDIA: ICD-10-CM

## 2023-03-03 NOTE — TELEPHONE ENCOUNTER
TIADYLT  MG 24 hr ER beaded capsule    Last Written Prescription Date:  1/06/2022  Last Fill Quantity: 90,   # refills: 0  Last Office Visit: 2/13/2023

## 2023-03-06 RX ORDER — DILTIAZEM HYDROCHLORIDE 240 MG/1
CAPSULE, EXTENDED RELEASE ORAL
Qty: 90 CAPSULE | Refills: 0 | Status: SHIPPED | OUTPATIENT
Start: 2023-03-06 | End: 2023-05-12

## 2023-04-10 DIAGNOSIS — Z12.11 COLON CANCER SCREENING: ICD-10-CM

## 2023-04-15 ENCOUNTER — HEALTH MAINTENANCE LETTER (OUTPATIENT)
Age: 53
End: 2023-04-15

## 2023-05-10 DIAGNOSIS — Z87.898 HISTORY OF TACHYCARDIA: ICD-10-CM

## 2023-05-10 DIAGNOSIS — F41.9 ANXIETY: ICD-10-CM

## 2023-05-10 DIAGNOSIS — M54.42 CHRONIC BILATERAL LOW BACK PAIN WITH BILATERAL SCIATICA: Primary | ICD-10-CM

## 2023-05-10 DIAGNOSIS — G89.29 CHRONIC BILATERAL LOW BACK PAIN WITH BILATERAL SCIATICA: Primary | ICD-10-CM

## 2023-05-10 DIAGNOSIS — M54.41 CHRONIC BILATERAL LOW BACK PAIN WITH BILATERAL SCIATICA: Primary | ICD-10-CM

## 2023-05-12 RX ORDER — DILTIAZEM HYDROCHLORIDE 240 MG/1
CAPSULE, EXTENDED RELEASE ORAL
Qty: 90 CAPSULE | Refills: 0 | Status: SHIPPED | OUTPATIENT
Start: 2023-05-12 | End: 2023-08-28

## 2023-05-12 RX ORDER — IBUPROFEN 600 MG/1
TABLET, FILM COATED ORAL
Qty: 90 TABLET | Refills: 0 | Status: SHIPPED | OUTPATIENT
Start: 2023-05-12 | End: 2023-07-14

## 2023-05-12 RX ORDER — ALPRAZOLAM 0.25 MG
TABLET ORAL
Qty: 90 TABLET | Refills: 2 | Status: SHIPPED | OUTPATIENT
Start: 2023-05-12 | End: 2023-08-10

## 2023-05-12 NOTE — TELEPHONE ENCOUNTER
Ibuprofen, Tiazac, Xanax      Last Written Prescription Date:  4.12.23, 2.14.23, 4.14.23  Last Fill Quantity: #90, #90, #90,   # refills: 0  Last Office Visit: 2.13.23  Future Office visit:       Routing refill request to provider for review/approval because:  Drug not on the [facility], P or [facility] refill protocol or controlled substance

## 2023-07-13 DIAGNOSIS — M54.42 CHRONIC BILATERAL LOW BACK PAIN WITH BILATERAL SCIATICA: ICD-10-CM

## 2023-07-13 DIAGNOSIS — G89.29 CHRONIC BILATERAL LOW BACK PAIN WITH BILATERAL SCIATICA: ICD-10-CM

## 2023-07-13 DIAGNOSIS — M54.41 CHRONIC BILATERAL LOW BACK PAIN WITH BILATERAL SCIATICA: ICD-10-CM

## 2023-07-14 RX ORDER — IBUPROFEN 600 MG/1
TABLET, FILM COATED ORAL
Qty: 90 TABLET | Refills: 0 | Status: SHIPPED | OUTPATIENT
Start: 2023-07-14 | End: 2023-08-10

## 2023-07-14 NOTE — TELEPHONE ENCOUNTER
IBU      Last Written Prescription Date:  05/12/23  Last Fill Quantity: 90,   # refills: 0  Last Office Visit: 02/13/23  Future Office visit:

## 2023-07-14 NOTE — TELEPHONE ENCOUNTER
NSAID Medications Failed      Normal ALT on file in past 12 months        Recent Labs   Lab Test 02/13/23  1040   ALT 46*       Normal AST on file in past 12 months        Recent Labs   Lab Test 08/14/17  1429   AST 29

## 2023-08-06 DIAGNOSIS — M54.41 CHRONIC BILATERAL LOW BACK PAIN WITH BILATERAL SCIATICA: ICD-10-CM

## 2023-08-06 DIAGNOSIS — F41.9 ANXIETY: ICD-10-CM

## 2023-08-06 DIAGNOSIS — M54.42 CHRONIC BILATERAL LOW BACK PAIN WITH BILATERAL SCIATICA: ICD-10-CM

## 2023-08-06 DIAGNOSIS — G89.29 CHRONIC BILATERAL LOW BACK PAIN WITH BILATERAL SCIATICA: ICD-10-CM

## 2023-08-09 NOTE — TELEPHONE ENCOUNTER
Ibuprofen    NSAID Medications Lyutni4008/06/2023 05:58 AM   Protocol Details Normal ALT on file in past 12 months    Normal AST on file in past 12 months     Lab Results   Component Value Date    ALT 46 02/13/2023    ALT 62 08/14/2017     AST   Date Value Ref Range Status   08/14/2017 29 0 - 45 U/L Final         ALPRAZolam 0.25 MG Oral Tablet     Routing refill request to provider for review/approval because:    Drug not on the [facility], P or [facility] refill protocol or controlled substance

## 2023-08-09 NOTE — TELEPHONE ENCOUNTER
Ibuprofen      Last Written Prescription Date:  7/14/23  Last Fill Quantity: 90,   # refills: 0  Last Office Visit: 2/13/23  Future Office visit:       Routing refill request to provider for review/approval because:      Xanax      Last Written Prescription Date:  5/12/23  Last Fill Quantity: 90,   # refills: 2  Last Office Visit: 2/13/23  Future Office visit:       Routing refill request to provider for review/approval because:

## 2023-08-10 RX ORDER — IBUPROFEN 600 MG/1
TABLET, FILM COATED ORAL
Qty: 90 TABLET | Refills: 0 | Status: SHIPPED | OUTPATIENT
Start: 2023-08-10 | End: 2023-10-17

## 2023-08-10 RX ORDER — ALPRAZOLAM 0.25 MG
TABLET ORAL
Qty: 90 TABLET | Refills: 0 | Status: SHIPPED | OUTPATIENT
Start: 2023-08-10 | End: 2023-09-14

## 2023-08-26 DIAGNOSIS — Z87.898 HISTORY OF TACHYCARDIA: ICD-10-CM

## 2023-08-28 RX ORDER — DILTIAZEM HYDROCHLORIDE 240 MG/1
240 CAPSULE, EXTENDED RELEASE ORAL DAILY
Qty: 90 CAPSULE | Refills: 0 | Status: SHIPPED | OUTPATIENT
Start: 2023-08-28 | End: 2023-09-14

## 2023-08-28 NOTE — TELEPHONE ENCOUNTER
Disp Refills Start End CHANI   diltiazem ER (TIAZAC) 240 MG 24 hr ER beaded capsule 90 capsule 0 5/12/2023     Last Office Visit: 2/13/23     Future Office visit:    Next 5 appointments (look out 90 days)      Sep 14, 2023 10:00 AM  (Arrive by 9:45 AM)  SHORT with Paz Maria MD  [facility] ([facility] ) [address]  [facility] [address]  [phone]             Routing refill request to provider for review/approval because:

## 2023-09-12 NOTE — PROGRESS NOTES
Assessment & Plan     1. Hyperlipidemia, unspecified hyperlipidemia type  Labs updated, will make recommendations based on results.  - Basic metabolic panel; Future  - Lipid Profile (Chol, Trig, HDL, LDL calc); Future  - Lipid Profile (Chol, Trig, HDL, LDL calc)  - Basic metabolic panel    2. Moderate recurrent major depression (H)  No changes, will refill when due    3. Anxiety  Refilled  - ALPRAZolam (XANAX) 0.25 MG tablet; Take 1 tablet (0.25 mg) by mouth 3 times daily as needed for anxiety  Dispense: 90 tablet; Refill: 5    4. Moderate persistent asthma without complication  No changes    5. Acquired hypothyroidism  Labs updated  - TSH with free T4 reflex; Future  - CBC with platelets; Future  - CBC with platelets  - TSH with free T4 reflex  - T4 free    6. Chronic bilateral low back pain with bilateral sciatica  As above  - diclofenac (VOLTAREN) 75 MG EC tablet; Take 1 tablet (75 mg) by mouth 2 times daily as needed for moderate pain  Dispense: 60 tablet; Refill: 2    7. Lumbosacral radiculopathy at S1  As above  - cyclobenzaprine (FLEXERIL) 10 MG tablet; Take 1 tablet (10 mg) by mouth 2 times daily as needed for muscle spasms  Dispense: 60 tablet; Refill: 3    8. Visit for screening mammogram  Ordered  - MA Screening Bilateral w/ Vignesh; Future    9. Screen for colon cancer  - CHINMAY([employer])    10. Hypothyroidism, unspecified type  - levothyroxine (SYNTHROID/LEVOTHROID) 125 MCG tablet; Take 1 tablet (125 mcg) by mouth daily  Dispense: 90 tablet; Refill: 3    11. History of tachycardia  - diltiazem ER (TIAZAC) 240 MG 24 hr ER beaded capsule; Take 1 capsule (240 mg) by mouth daily  Dispense: 90 capsule; Refill: 3    12. Midline low back pain with left-sided sciatica, unspecified chronicity  - cyclobenzaprine (FLEXERIL) 10 MG tablet; Take 1 tablet (10 mg) by mouth 2 times daily as needed for muscle spasms  Dispense: 60 tablet; Refill: 3    13. Osteoarthritis of spine with radiculopathy, lumbar  "region  - cyclobenzaprine (FLEXERIL) 10 MG tablet; Take 1 tablet (10 mg) by mouth 2 times daily as needed for muscle spasms  Dispense: 60 tablet; Refill: 3    14. Morbid obesity (H)  Referral ordered at patient request  - Adult Comprehensive Weight Management  Referral; Future        Nicotine/Tobacco Cessation:  She reports that she has been smoking cigarettes. She has a 24.00 pack-year smoking history. She has never used smokeless tobacco.  Nicotine/Tobacco Cessation Plan:   Information offered: Patient not interested at this time      BMI:   Estimated body mass index is 52.18 kg/m  as calculated from the following:    Height as of this encounter: 1.626 m (5' 4\").    Weight as of this encounter: 137.9 kg (304 lb).   Weight management plan: Patient referred to endocrine and/or weight management specialty      No follow-ups on file.    Paz Maria MD  [facility] - [facility] Hahn is a 52 year old, presenting for the following health issues:  Lipids, Depression, Anxiety, Asthma, and Thyroid Problem      HPI     Hyperlipidemia Follow-Up    Are you regularly taking any medication or supplement to lower your cholesterol?   No  Are you having muscle aches or other side effects that you think could be caused by your cholesterol lowering medication?  No    Depression and Anxiety Follow-Up  How are you doing with your depression since your last visit? No change  How are you doing with your anxiety since your last visit?  No change  Are you having other symptoms that might be associated with depression or anxiety? No  Have you had a significant life event? No   Do you have any concerns with your use of alcohol or other drugs? No    Social History     Tobacco Use    Smoking status: Every Day     Packs/day: 1.00     Years: 24.00     Pack years: 24.00     Types: Cigarettes    Smokeless tobacco: Never    Tobacco comments:     Tried to Quit (NO); Passive Exposure (NO)   Substance Use " Topics    Alcohol use: Yes     Comment: 3 Beers - WEEKLY    Drug use: No         6/23/2022     9:00 AM 2/13/2023     9:50 AM 9/14/2023     7:33 AM   PHQ   PHQ-9 Total Score 8 13 8   Q9: Thoughts of better off dead/self-harm past 2 weeks Not at all Not at all Not at all         6/23/2022     9:00 AM 2/13/2023     9:00 AM 9/14/2023     7:34 AM   LIZETT-7 SCORE   Total Score   4 (minimal anxiety)   Total Score 5 2 4         9/14/2023     7:33 AM   Last PHQ-9   1.  Little interest or pleasure in doing things 1   2.  Feeling down, depressed, or hopeless 1   3.  Trouble falling or staying asleep, or sleeping too much 2   4.  Feeling tired or having little energy 2   5.  Poor appetite or overeating 0   6.  Feeling bad about yourself 1   7.  Trouble concentrating 1   8.  Moving slowly or restless 0   Q9: Thoughts of better off dead/self-harm past 2 weeks 0   PHQ-9 Total Score 8         9/14/2023     7:34 AM   LIZETT-7    1. Feeling nervous, anxious, or on edge 1   2. Not being able to stop or control worrying 0   3. Worrying too much about different things 1   4. Trouble relaxing 1   5. Being so restless that it is hard to sit still 0   6. Becoming easily annoyed or irritable 1   7. Feeling afraid, as if something awful might happen 0   LIZETT-7 Total Score 4   If you checked any problems, how difficult have they made it for you to do your work, take care of things at home, or get along with other people? Not difficult at all       Suicide Assessment Five-step Evaluation and Treatment (SAFE-T)    Asthma Follow-Up    Was ACT completed today?  Yes        9/14/2023     7:36 AM   ACT Total Scores   ACT TOTAL SCORE (Goal Greater than or Equal to 20) 23   In the past 12 months, how many times did you visit the emergency room for your asthma without being admitted to the hospital? 0   In the past 12 months, how many times were you hospitalized overnight because of your asthma? 0        How many days per week do you miss taking your asthma  "controller medication?  I do not have an asthma controller medication  Please describe any recent triggers for your asthma: pollens and mold  Have you had any Emergency Room Visits, Urgent Care Visits, or Hospital Admissions since your last office visit?  No  Hypothyroidism Follow-up    Since last visit, patient describes the following symptoms: Weight stable, no hair loss, no skin changes, no constipation, no loose stools, anxiety, and depression        Review of Systems   Constitutional, HEENT, cardiovascular, pulmonary, gi and gu systems are negative, except as otherwise noted.      Objective    BP (!) 152/80 (BP Location: Right arm, Patient Position: Sitting, Cuff Size: Adult Regular)   Pulse 80   Temp 97.4  F (36.3  C) (Tympanic)   Resp 18   Ht 1.626 m (5' 4\")   Wt 137.9 kg (304 lb)   SpO2 96%   BMI 52.18 kg/m    Body mass index is 52.18 kg/m .  Physical Exam   GENERAL: healthy, alert and no distress  PSYCH: mentation appears normal, affect normal/bright    Patient states she was upset this morning, and that is why her BP was up.  Unfortunately, it was not rechecked prior to her departure from the clinic.          Answers submitted by the patient for this visit:  Patient Health Questionnaire (Submitted on 9/14/2023)  If you checked off any problems, how difficult have these problems made it for you to do your work, take care of things at home, or get along with other people?: Somewhat difficult  PHQ9 TOTAL SCORE: 8  LIZETT-7 (Submitted on 9/14/2023)  LIZETT 7 TOTAL SCORE: 4    "

## 2023-09-14 ENCOUNTER — OFFICE VISIT (OUTPATIENT)
Dept: FAMILY MEDICINE | Facility: OTHER | Age: 53
End: 2023-09-14
Attending: FAMILY MEDICINE
Payer: MEDICARE

## 2023-09-14 VITALS
BODY MASS INDEX: 50.02 KG/M2 | HEART RATE: 80 BPM | HEIGHT: 64 IN | RESPIRATION RATE: 18 BRPM | WEIGHT: 293 LBS | TEMPERATURE: 97.4 F | DIASTOLIC BLOOD PRESSURE: 80 MMHG | OXYGEN SATURATION: 96 % | SYSTOLIC BLOOD PRESSURE: 152 MMHG

## 2023-09-14 DIAGNOSIS — M54.17 LUMBOSACRAL RADICULOPATHY AT S1: ICD-10-CM

## 2023-09-14 DIAGNOSIS — Z12.11 SCREEN FOR COLON CANCER: ICD-10-CM

## 2023-09-14 DIAGNOSIS — G89.29 CHRONIC BILATERAL LOW BACK PAIN WITH BILATERAL SCIATICA: ICD-10-CM

## 2023-09-14 DIAGNOSIS — Z12.31 VISIT FOR SCREENING MAMMOGRAM: ICD-10-CM

## 2023-09-14 DIAGNOSIS — F41.9 ANXIETY: ICD-10-CM

## 2023-09-14 DIAGNOSIS — M47.26 OSTEOARTHRITIS OF SPINE WITH RADICULOPATHY, LUMBAR REGION: ICD-10-CM

## 2023-09-14 DIAGNOSIS — M54.42 CHRONIC BILATERAL LOW BACK PAIN WITH BILATERAL SCIATICA: ICD-10-CM

## 2023-09-14 DIAGNOSIS — E03.9 ACQUIRED HYPOTHYROIDISM: ICD-10-CM

## 2023-09-14 DIAGNOSIS — J45.40 MODERATE PERSISTENT ASTHMA WITHOUT COMPLICATION: ICD-10-CM

## 2023-09-14 DIAGNOSIS — E03.9 HYPOTHYROIDISM, UNSPECIFIED TYPE: ICD-10-CM

## 2023-09-14 DIAGNOSIS — M54.42 MIDLINE LOW BACK PAIN WITH LEFT-SIDED SCIATICA, UNSPECIFIED CHRONICITY: ICD-10-CM

## 2023-09-14 DIAGNOSIS — M54.41 CHRONIC BILATERAL LOW BACK PAIN WITH BILATERAL SCIATICA: ICD-10-CM

## 2023-09-14 DIAGNOSIS — E78.5 HYPERLIPIDEMIA, UNSPECIFIED HYPERLIPIDEMIA TYPE: Primary | ICD-10-CM

## 2023-09-14 DIAGNOSIS — E66.01 MORBID OBESITY (H): ICD-10-CM

## 2023-09-14 DIAGNOSIS — Z87.898 HISTORY OF TACHYCARDIA: ICD-10-CM

## 2023-09-14 DIAGNOSIS — F33.1 MODERATE RECURRENT MAJOR DEPRESSION (H): ICD-10-CM

## 2023-09-14 LAB
ANION GAP SERPL CALCULATED.3IONS-SCNC: 13 MMOL/L (ref 7–15)
BUN SERPL-MCNC: 9.8 MG/DL (ref 6–20)
CALCIUM SERPL-MCNC: 9.4 MG/DL (ref 8.6–10)
CHLORIDE SERPL-SCNC: 100 MMOL/L (ref 98–107)
CHOLEST SERPL-MCNC: 167 MG/DL
CREAT SERPL-MCNC: 0.62 MG/DL (ref 0.51–0.95)
DEPRECATED HCO3 PLAS-SCNC: 25 MMOL/L (ref 22–29)
EGFRCR SERPLBLD CKD-EPI 2021: >90 ML/MIN/1.73M2
ERYTHROCYTE [DISTWIDTH] IN BLOOD BY AUTOMATED COUNT: 13.1 % (ref 10–15)
GLUCOSE SERPL-MCNC: 105 MG/DL (ref 70–99)
HCT VFR BLD AUTO: 44.4 % (ref 35–47)
HDLC SERPL-MCNC: 28 MG/DL
HGB BLD-MCNC: 14.9 G/DL (ref 11.7–15.7)
LDLC SERPL CALC-MCNC: 99 MG/DL
MCH RBC QN AUTO: 31.8 PG (ref 26.5–33)
MCHC RBC AUTO-ENTMCNC: 33.6 G/DL (ref 31.5–36.5)
MCV RBC AUTO: 95 FL (ref 78–100)
NONHDLC SERPL-MCNC: 139 MG/DL
PLATELET # BLD AUTO: 323 10E3/UL (ref 150–450)
POTASSIUM SERPL-SCNC: 4.4 MMOL/L (ref 3.4–5.3)
RBC # BLD AUTO: 4.68 10E6/UL (ref 3.8–5.2)
SODIUM SERPL-SCNC: 138 MMOL/L (ref 136–145)
T4 FREE SERPL-MCNC: 0.86 NG/DL (ref 0.9–1.7)
TRIGL SERPL-MCNC: 201 MG/DL
TSH SERPL DL<=0.005 MIU/L-ACNC: 7.36 UIU/ML (ref 0.3–4.2)
WBC # BLD AUTO: 9.6 10E3/UL (ref 4–11)

## 2023-09-14 PROCEDURE — 84439 ASSAY OF FREE THYROXINE: CPT | Mod: ZL | Performed by: FAMILY MEDICINE

## 2023-09-14 PROCEDURE — 82310 ASSAY OF CALCIUM: CPT | Mod: ZL | Performed by: FAMILY MEDICINE

## 2023-09-14 PROCEDURE — 36415 COLL VENOUS BLD VENIPUNCTURE: CPT | Mod: ZL | Performed by: FAMILY MEDICINE

## 2023-09-14 PROCEDURE — 80061 LIPID PANEL: CPT | Mod: ZL | Performed by: FAMILY MEDICINE

## 2023-09-14 PROCEDURE — 99214 OFFICE O/P EST MOD 30 MIN: CPT | Performed by: FAMILY MEDICINE

## 2023-09-14 PROCEDURE — 84443 ASSAY THYROID STIM HORMONE: CPT | Mod: ZL | Performed by: FAMILY MEDICINE

## 2023-09-14 PROCEDURE — 85027 COMPLETE CBC AUTOMATED: CPT | Mod: ZL | Performed by: FAMILY MEDICINE

## 2023-09-14 PROCEDURE — G0463 HOSPITAL OUTPT CLINIC VISIT: HCPCS

## 2023-09-14 PROCEDURE — 82374 ASSAY BLOOD CARBON DIOXIDE: CPT | Mod: ZL | Performed by: FAMILY MEDICINE

## 2023-09-14 RX ORDER — LEVOTHYROXINE SODIUM 125 UG/1
125 TABLET ORAL DAILY
Qty: 90 TABLET | Refills: 3 | Status: SHIPPED | OUTPATIENT
Start: 2023-09-14 | End: 2023-09-18

## 2023-09-14 RX ORDER — ALPRAZOLAM 0.25 MG
0.25 TABLET ORAL 3 TIMES DAILY PRN
Qty: 90 TABLET | Refills: 5 | Status: SHIPPED | OUTPATIENT
Start: 2023-09-14 | End: 2024-03-05

## 2023-09-14 RX ORDER — CYCLOBENZAPRINE HCL 10 MG
10 TABLET ORAL 2 TIMES DAILY PRN
Qty: 60 TABLET | Refills: 3 | Status: SHIPPED | OUTPATIENT
Start: 2023-09-14 | End: 2024-08-30

## 2023-09-14 RX ORDER — DICLOFENAC SODIUM 75 MG/1
75 TABLET, DELAYED RELEASE ORAL 2 TIMES DAILY PRN
Qty: 60 TABLET | Refills: 2 | Status: SHIPPED | OUTPATIENT
Start: 2023-09-14

## 2023-09-14 RX ORDER — DILTIAZEM HYDROCHLORIDE 240 MG/1
240 CAPSULE, EXTENDED RELEASE ORAL DAILY
Qty: 90 CAPSULE | Refills: 3 | Status: SHIPPED | OUTPATIENT
Start: 2023-09-14

## 2023-09-14 ASSESSMENT — ANXIETY QUESTIONNAIRES
2. NOT BEING ABLE TO STOP OR CONTROL WORRYING: NOT AT ALL
7. FEELING AFRAID AS IF SOMETHING AWFUL MIGHT HAPPEN: NOT AT ALL
GAD7 TOTAL SCORE: 4
4. TROUBLE RELAXING: SEVERAL DAYS
3. WORRYING TOO MUCH ABOUT DIFFERENT THINGS: SEVERAL DAYS
5. BEING SO RESTLESS THAT IT IS HARD TO SIT STILL: NOT AT ALL
GAD7 TOTAL SCORE: 4
6. BECOMING EASILY ANNOYED OR IRRITABLE: SEVERAL DAYS
IF YOU CHECKED OFF ANY PROBLEMS ON THIS QUESTIONNAIRE, HOW DIFFICULT HAVE THESE PROBLEMS MADE IT FOR YOU TO DO YOUR WORK, TAKE CARE OF THINGS AT HOME, OR GET ALONG WITH OTHER PEOPLE: NOT DIFFICULT AT ALL
1. FEELING NERVOUS, ANXIOUS, OR ON EDGE: SEVERAL DAYS

## 2023-09-14 ASSESSMENT — PAIN SCALES - GENERAL: PAINLEVEL: SEVERE PAIN (6)

## 2023-09-14 ASSESSMENT — PATIENT HEALTH QUESTIONNAIRE - PHQ9
10. IF YOU CHECKED OFF ANY PROBLEMS, HOW DIFFICULT HAVE THESE PROBLEMS MADE IT FOR YOU TO DO YOUR WORK, TAKE CARE OF THINGS AT HOME, OR GET ALONG WITH OTHER PEOPLE: SOMEWHAT DIFFICULT
SUM OF ALL RESPONSES TO PHQ QUESTIONS 1-9: 8
SUM OF ALL RESPONSES TO PHQ QUESTIONS 1-9: 8

## 2023-09-14 ASSESSMENT — ASTHMA QUESTIONNAIRES: ACT_TOTALSCORE: 23

## 2023-09-14 NOTE — PROGRESS NOTES
Attempt # 1  Outcome: Left Message   Comment: LVM for patient to call scheduling line back to schedule a 6 month follow up with PCP.

## 2023-09-18 DIAGNOSIS — E03.9 HYPOTHYROIDISM, UNSPECIFIED TYPE: ICD-10-CM

## 2023-09-18 RX ORDER — LEVOTHYROXINE SODIUM 137 UG/1
137 TABLET ORAL DAILY
Qty: 90 TABLET | Refills: 1 | Status: SHIPPED | OUTPATIENT
Start: 2023-09-18 | End: 2024-02-21

## 2023-10-14 DIAGNOSIS — G89.29 CHRONIC BILATERAL LOW BACK PAIN WITH BILATERAL SCIATICA: ICD-10-CM

## 2023-10-14 DIAGNOSIS — M54.41 CHRONIC BILATERAL LOW BACK PAIN WITH BILATERAL SCIATICA: ICD-10-CM

## 2023-10-14 DIAGNOSIS — M54.42 CHRONIC BILATERAL LOW BACK PAIN WITH BILATERAL SCIATICA: ICD-10-CM

## 2023-10-16 NOTE — TELEPHONE ENCOUNTER
Ibuprofen      Last Written Prescription Date:  11/11/22  Last Fill Quantity: 18g,   # refills: 1  Last Office Visit: 9/14/23  Future Office visit:    Next 5 appointments (look out 90 days)      Nov 13, 2023  3:00 PM  (Arrive by 2:45 PM)  Office Visit with Kayleen Ugarte NP  [facility] [place] ([facility] [place] ) [address]  [place] [address]  [phone]             Routing refill request to provider for review/approval because:

## 2023-10-17 RX ORDER — IBUPROFEN 600 MG/1
TABLET, FILM COATED ORAL
Qty: 90 TABLET | Refills: 0 | Status: SHIPPED | OUTPATIENT
Start: 2023-10-17 | End: 2023-11-10

## 2023-10-20 NOTE — TELEPHONE ENCOUNTER
Last office visit 10/09/15.  Marko last filled 11/08/16 #90. Patient due for office visit. Please advise. Medication pended.  
Overdue for appointment, needs to schedule prior to further refills.  
independent

## 2023-11-09 DIAGNOSIS — G89.29 CHRONIC BILATERAL LOW BACK PAIN WITH BILATERAL SCIATICA: ICD-10-CM

## 2023-11-09 DIAGNOSIS — M54.41 CHRONIC BILATERAL LOW BACK PAIN WITH BILATERAL SCIATICA: ICD-10-CM

## 2023-11-09 DIAGNOSIS — M54.42 CHRONIC BILATERAL LOW BACK PAIN WITH BILATERAL SCIATICA: ICD-10-CM

## 2023-11-10 RX ORDER — IBUPROFEN 600 MG/1
TABLET, FILM COATED ORAL
Qty: 90 TABLET | Refills: 0 | Status: SHIPPED | OUTPATIENT
Start: 2023-11-10 | End: 2024-01-29

## 2023-11-10 NOTE — TELEPHONE ENCOUNTER
Ibuprofen      Last Written Prescription Date:  10/17/23  Last Fill Quantity: 90,   # refills: 0  Last Office Visit: 9/14/23  Future Office visit:    Next 5 appointments (look out 90 days)      Nov 13, 2023  3:00 PM  (Arrive by 2:45 PM)  Office Visit with Kayleen Ugarte NP  [facility] [place] ([facility] [place] ) [address]  [place] [address]  [phone]             Routing refill request to provider for review/approval because:

## 2023-12-07 ENCOUNTER — MYC REFILL (OUTPATIENT)
Dept: FAMILY MEDICINE | Facility: OTHER | Age: 53
End: 2023-12-07

## 2023-12-07 DIAGNOSIS — L02.02 BOIL, FACE: ICD-10-CM

## 2023-12-07 RX ORDER — CLINDAMYCIN PHOSPHATE 10 UG/ML
LOTION TOPICAL 2 TIMES DAILY
Qty: 60 ML | Refills: 1 | Status: SHIPPED | OUTPATIENT
Start: 2023-12-07

## 2023-12-07 NOTE — TELEPHONE ENCOUNTER
Clindamycin 1% lotion      Last Written Prescription Date:  6-23-22  Last Fill Quantity: 90 ML,   # refills: 1  Last Office Visit: 9-14-23  Future Office visit:    Next 5 appointments (look out 90 days)      Jan 22, 2024  1:00 PM  (Arrive by 12:45 PM)  Office Visit with Kayleen Ugarte NP  [facility] [place] ([facility] [place] ) [address]  [place] [address]  [phone]

## 2024-01-12 ENCOUNTER — TELEPHONE (OUTPATIENT)
Dept: FAMILY MEDICINE | Facility: OTHER | Age: 54
End: 2024-01-12

## 2024-01-12 NOTE — TELEPHONE ENCOUNTER
Attempt # 1  Outcome: Left Message   Comment: lvm for patient to return call to reschedule 1-22 appt as provider is out of office. Please offer appt on 1-18 first. Thank you.

## 2024-01-20 ENCOUNTER — E-VISIT (OUTPATIENT)
Dept: FAMILY MEDICINE | Facility: OTHER | Age: 54
End: 2024-01-20
Attending: FAMILY MEDICINE
Payer: MEDICARE

## 2024-01-20 DIAGNOSIS — K04.7 DENTAL INFECTION: Primary | ICD-10-CM

## 2024-01-20 PROCEDURE — 99421 OL DIG E/M SVC 5-10 MIN: CPT | Performed by: FAMILY MEDICINE

## 2024-01-26 DIAGNOSIS — G89.29 CHRONIC BILATERAL LOW BACK PAIN WITH BILATERAL SCIATICA: ICD-10-CM

## 2024-01-26 DIAGNOSIS — M54.42 CHRONIC BILATERAL LOW BACK PAIN WITH BILATERAL SCIATICA: ICD-10-CM

## 2024-01-26 DIAGNOSIS — M54.41 CHRONIC BILATERAL LOW BACK PAIN WITH BILATERAL SCIATICA: ICD-10-CM

## 2024-01-26 RX ORDER — PENICILLIN V POTASSIUM 500 MG/1
500 TABLET, FILM COATED ORAL 2 TIMES DAILY
Qty: 14 TABLET | Refills: 0 | Status: SHIPPED | OUTPATIENT
Start: 2024-01-26 | End: 2024-02-02

## 2024-01-26 NOTE — PATIENT INSTRUCTIONS
Thank you for choosing us for your care. I have placed an order for a prescription so that you can start treatment. We will start an antibiotic to cover for possible infection, but the main way to improve this situation is to see your dental provider and have the tooth fixed.       If you're not feeling better within 5-7 days, please schedule an appointment.  If the visit is for the same symptoms as your eVisit, we'll refund the cost of your eVisit if seen within seven days.      Paz Maria MD    
15

## 2024-01-26 NOTE — TELEPHONE ENCOUNTER
Provider E-Visit time total (minutes): 5    ELECTRONIC VISIT DOCUMENTATION:    SUBJECTIVE:  Note above accurately captures the substance of my conversation with the patient.    ASSESSMENT/ PLAN:  1. Dental infection  Will cover for possible infection due to swelling, but patient is encouraged to schedule with a dental provider.  - penicillin V (VEETID) 500 MG tablet; Take 1 tablet (500 mg) by mouth 2 times daily for 7 days  Dispense: 14 tablet; Refill: 0    Paz Maria MD

## 2024-01-29 DIAGNOSIS — R06.02 SOB (SHORTNESS OF BREATH): ICD-10-CM

## 2024-01-29 RX ORDER — IBUPROFEN 600 MG/1
TABLET, FILM COATED ORAL
Qty: 90 TABLET | Refills: 0 | Status: SHIPPED | OUTPATIENT
Start: 2024-01-29 | End: 2024-02-26

## 2024-01-29 NOTE — TELEPHONE ENCOUNTER
Ibuprofen  Last Written Prescription Date: 11/10/23  Last Fill Quantity: 90 # of Refills: 0  Last Office Visit: 1/20/24

## 2024-01-29 NOTE — TELEPHONE ENCOUNTER
Albuterol HFA  Last Written Prescription Date: 11/11/22  Last Fill Quantity: 18 g # of Refills: 1  Last Office Visit: 1/20/24

## 2024-01-30 RX ORDER — ALBUTEROL SULFATE 90 UG/1
AEROSOL, METERED RESPIRATORY (INHALATION)
Qty: 18 G | Refills: 3 | Status: SHIPPED | OUTPATIENT
Start: 2024-01-30

## 2024-02-02 ENCOUNTER — MYC MEDICAL ADVICE (OUTPATIENT)
Dept: FAMILY MEDICINE | Facility: OTHER | Age: 54
End: 2024-02-02

## 2024-02-02 DIAGNOSIS — K04.7 DENTAL INFECTION: ICD-10-CM

## 2024-02-02 RX ORDER — PENICILLIN V POTASSIUM 500 MG/1
500 TABLET, FILM COATED ORAL 2 TIMES DAILY
Qty: 14 TABLET | Refills: 0 | OUTPATIENT
Start: 2024-02-02 | End: 2024-02-09

## 2024-02-02 NOTE — TELEPHONE ENCOUNTER
1/20/24 Dental Infection- E Visit     Call placed to patient to inquire on symptoms and need for refill on abx; patient reports dental infection has not cleared up- sore on inside of cheek with swelling. Patient may need a stronger antibiotic. Dental Office has not returned call to coordinate appointment.    Last PCN taken today.    Patient stating she can send a picture via moziyt if need be.     Will return call to patient.

## 2024-02-02 NOTE — TELEPHONE ENCOUNTER
Veetid      Last Written Prescription Date:  1/26/24  Last Fill Quantity: 14,   # refills: 0  Last Office Visit: 9/14/23  Future Office visit:    Next 5 appointments (look out 90 days)      Feb 20, 2024  2:00 PM  (Arrive by 1:45 PM)  Office Visit with Kayleen Ugarte NP  [facility] [place] ([facility] [place] ) [address]  [place] [address]  [phone]             Routing refill request to provider for review/approval because:

## 2024-02-02 NOTE — TELEPHONE ENCOUNTER
Addressed in refill request - if she really isn't noting improvement, she really should be seen, and due to time of day and resources, she should go to ER/UC.  It looks like Minerva may have already relayed this message

## 2024-02-02 NOTE — TELEPHONE ENCOUNTER
Call placed to patient, notified patient [facility] Dr. Ramsey is advising ED/UC.     Patient verbalized understanding.

## 2024-02-19 ASSESSMENT — ANXIETY QUESTIONNAIRES
7. FEELING AFRAID AS IF SOMETHING AWFUL MIGHT HAPPEN: NOT AT ALL
GAD7 TOTAL SCORE: 4
GAD7 TOTAL SCORE: 4
1. FEELING NERVOUS, ANXIOUS, OR ON EDGE: SEVERAL DAYS
3. WORRYING TOO MUCH ABOUT DIFFERENT THINGS: SEVERAL DAYS
GAD7 TOTAL SCORE: 4
IF YOU CHECKED OFF ANY PROBLEMS ON THIS QUESTIONNAIRE, HOW DIFFICULT HAVE THESE PROBLEMS MADE IT FOR YOU TO DO YOUR WORK, TAKE CARE OF THINGS AT HOME, OR GET ALONG WITH OTHER PEOPLE: NOT DIFFICULT AT ALL
8. IF YOU CHECKED OFF ANY PROBLEMS, HOW DIFFICULT HAVE THESE MADE IT FOR YOU TO DO YOUR WORK, TAKE CARE OF THINGS AT HOME, OR GET ALONG WITH OTHER PEOPLE?: NOT DIFFICULT AT ALL
4. TROUBLE RELAXING: NOT AT ALL
7. FEELING AFRAID AS IF SOMETHING AWFUL MIGHT HAPPEN: NOT AT ALL
6. BECOMING EASILY ANNOYED OR IRRITABLE: SEVERAL DAYS
2. NOT BEING ABLE TO STOP OR CONTROL WORRYING: SEVERAL DAYS
5. BEING SO RESTLESS THAT IT IS HARD TO SIT STILL: NOT AT ALL

## 2024-02-19 ASSESSMENT — PATIENT HEALTH QUESTIONNAIRE - PHQ9
SUM OF ALL RESPONSES TO PHQ QUESTIONS 1-9: 10
10. IF YOU CHECKED OFF ANY PROBLEMS, HOW DIFFICULT HAVE THESE PROBLEMS MADE IT FOR YOU TO DO YOUR WORK, TAKE CARE OF THINGS AT HOME, OR GET ALONG WITH OTHER PEOPLE: SOMEWHAT DIFFICULT
SUM OF ALL RESPONSES TO PHQ QUESTIONS 1-9: 10

## 2024-02-19 ASSESSMENT — ASTHMA QUESTIONNAIRES
QUESTION_2 LAST FOUR WEEKS HOW OFTEN HAVE YOU HAD SHORTNESS OF BREATH: ONCE OR TWICE A WEEK
QUESTION_5 LAST FOUR WEEKS HOW WOULD YOU RATE YOUR ASTHMA CONTROL: COMPLETELY CONTROLLED
QUESTION_4 LAST FOUR WEEKS HOW OFTEN HAVE YOU USED YOUR RESCUE INHALER OR NEBULIZER MEDICATION (SUCH AS ALBUTEROL): ONCE A WEEK OR LESS
ACT_TOTALSCORE: 23
QUESTION_3 LAST FOUR WEEKS HOW OFTEN DID YOUR ASTHMA SYMPTOMS (WHEEZING, COUGHING, SHORTNESS OF BREATH, CHEST TIGHTNESS OR PAIN) WAKE YOU UP AT NIGHT OR EARLIER THAN USUAL IN THE MORNING: NOT AT ALL
ACT_TOTALSCORE: 23
QUESTION_1 LAST FOUR WEEKS HOW MUCH OF THE TIME DID YOUR ASTHMA KEEP YOU FROM GETTING AS MUCH DONE AT WORK, SCHOOL OR AT HOME: NONE OF THE TIME

## 2024-02-19 NOTE — PROGRESS NOTES
Selma Dubose is presenting for evaluation of medical weight management. She has had weight problems for about 25 years. 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 Maximum weight is 310 pounds which is her goal weight. She would like to lose 150 pounds. Goal weight is 160 pounds. Other methods the patient has tried to lose weight include low carb and exercise.     Processed Foods: none  Alcohol: 3 beers 1-3 times per week, typically light beers  Soda/pop: diet soda rarely   Meals Prepared by: self  Psychologic issues: some anxiety and depression-both controlled  Family Support: good-mom and sisters, little support from boyfriend   Activity/Exercise: none-difficult with back pain  Insight/Motivation: good, thinks weight loss will help with her back pain  Sleep habits: poor, she does have SARA, does not tolerate the CPAP  Cravings: none  Appetite: good  Medications contributing to weight issues: none    Co-morbidities       Patient Active Problem List   Diagnosis    Acquired hypothyroidism    Anxiety    History of tachycardia    Obstructive sleep apnea    Tobacco dependence    Spina bifida with hydrocephalus (H)    history of brain aneurism    Tension-type headache, not intractable    history of Noncompliance with medication regimen    Osteoarthritis of spine with radiculopathy, lumbar region    Lumbosacral radiculopathy at S1    Spinal stenosis of lumbar region    Moderate persistent asthma without complication    Morbid obesity (H)    Chronic bilateral low back pain with sciatica    Hyperlipidemia, unspecified hyperlipidemia type    Moderate recurrent major depression (H)       Meds:          Current Outpatient Medications   Medication    ibuprofen (ADVIL/MOTRIN) 600 MG tablet    albuterol (PROAIR HFA/PROVENTIL HFA/VENTOLIN HFA) 108 (90 Base) MCG/ACT inhaler    ALPRAZolam (XANAX) 0.25 MG tablet    clindamycin (CLEOCIN T) 1 % external lotion    cyclobenzaprine (FLEXERIL) 10 MG tablet    diclofenac  (VOLTAREN) 75 MG EC tablet    diltiazem ER (TIAZAC) 240 MG 24 hr ER beaded capsule    levothyroxine (SYNTHROID/LEVOTHROID) 137 MCG tablet    nicotine polacrilex (NICORETTE) 4 MG gum      No current facility-administered medications for this visit.       No current facility-administered medications for this visit.     Current Eating Patterns:   Generally 3 meals per day.   Breakfast: coffee with creamer   Midmorning snack: none  Lunch: rare-left-overs or sandwich   Mid afternoon snack: none  Supper: hamburger with low carb bun, cottage cheese, low carb noodles with sauce, meat, limited rice and potatoes  Grazing: rare    OBJECTIVE:   BP (!) 160/80   Pulse 90   Temp 97.5  F (36.4  C) (Tympanic)   Wt 140.6 kg (310 lb)   SpO2 93%   BMI 53.21 kg/m        Physical Exam   Exam:  Constitutional: healthy, alert, no distress, and over weight  Psychiatric: mentation appears normal and affect normal/bright        ASSESSMENT:   -Obesity Stage morbid. Body mass index is 53.21.  -Hypothyroidism; TSH elevated in 9/2024, Synthroid was increased. Will recheck TSH today.   -Hyperglycemia; recent glucoses have been high. Will run an A1C.   -No recent Vit D. Will check today.   -She does have SARA, does not tolerate the CPAP, should improve with weight loss.   -Hyperlipidemia; should improve with weight loss.   -HTN; elevated, should improve with weight loss. Has not been this high in the past, anxious today, will recheck in 4 weeks  -She would like to start GLP-1; she is aware of the side effects. No family h/o thyroid cancer. Will see what A1C is; if in the DM range, will order Ozempic. If not, will try ordering Wegovy.   -Anxiety controlled.     Protein Goal: 70-90 grams  Carb Limit: less than 50 grams  Fat Goal: 60-70% of calories  Calorie Goal: 1200    PLAN: Initiated a low carb diet. Will avoid high carb foods. Went over protein/carbohydrate/fat recommendations. Reminded patient to focus on getting appropriate amounts of  protein. Discussed the rational for eating higher fat. This equates to around 60- 70% fat. Limit or avoid sugar, starches, bread products, rice and cereal, most grains and simple carbohydrates. Goal set for 6 lb weight loss over the next 4 weeks. F/U in 4 weeks with myself.

## 2024-02-20 ENCOUNTER — OFFICE VISIT (OUTPATIENT)
Dept: FAMILY MEDICINE | Facility: OTHER | Age: 54
End: 2024-02-20
Attending: NURSE PRACTITIONER
Payer: MEDICARE

## 2024-02-20 VITALS
WEIGHT: 293 LBS | OXYGEN SATURATION: 93 % | BODY MASS INDEX: 53.21 KG/M2 | DIASTOLIC BLOOD PRESSURE: 80 MMHG | TEMPERATURE: 97.5 F | SYSTOLIC BLOOD PRESSURE: 160 MMHG | HEART RATE: 90 BPM

## 2024-02-20 DIAGNOSIS — G47.33 OSA (OBSTRUCTIVE SLEEP APNEA): ICD-10-CM

## 2024-02-20 DIAGNOSIS — E66.01 MORBID OBESITY (H): Primary | ICD-10-CM

## 2024-02-20 DIAGNOSIS — E03.9 HYPOTHYROIDISM, UNSPECIFIED TYPE: ICD-10-CM

## 2024-02-20 DIAGNOSIS — E78.5 HYPERLIPIDEMIA, UNSPECIFIED HYPERLIPIDEMIA TYPE: ICD-10-CM

## 2024-02-20 DIAGNOSIS — R73.9 HYPERGLYCEMIA: ICD-10-CM

## 2024-02-20 PROBLEM — M43.16 SPONDYLOLISTHESIS OF LUMBAR REGION: Status: ACTIVE | Noted: 2017-03-30

## 2024-02-20 LAB
EST. AVERAGE GLUCOSE BLD GHB EST-MCNC: 123 MG/DL
HBA1C MFR BLD: 5.9 %
T4 FREE SERPL-MCNC: 0.86 NG/DL (ref 0.9–1.7)
TSH SERPL DL<=0.005 MIU/L-ACNC: 5.71 UIU/ML (ref 0.3–4.2)

## 2024-02-20 PROCEDURE — 36415 COLL VENOUS BLD VENIPUNCTURE: CPT | Mod: ZL | Performed by: NURSE PRACTITIONER

## 2024-02-20 PROCEDURE — 99214 OFFICE O/P EST MOD 30 MIN: CPT | Performed by: NURSE PRACTITIONER

## 2024-02-20 PROCEDURE — 82306 VITAMIN D 25 HYDROXY: CPT | Mod: ZL | Performed by: NURSE PRACTITIONER

## 2024-02-20 PROCEDURE — G0463 HOSPITAL OUTPT CLINIC VISIT: HCPCS

## 2024-02-20 PROCEDURE — 84443 ASSAY THYROID STIM HORMONE: CPT | Mod: ZL | Performed by: NURSE PRACTITIONER

## 2024-02-20 PROCEDURE — 84439 ASSAY OF FREE THYROXINE: CPT | Mod: ZL | Performed by: NURSE PRACTITIONER

## 2024-02-20 PROCEDURE — 83036 HEMOGLOBIN GLYCOSYLATED A1C: CPT | Mod: ZL | Performed by: NURSE PRACTITIONER

## 2024-02-21 RX ORDER — LEVOTHYROXINE SODIUM 150 UG/1
150 TABLET ORAL DAILY
Qty: 90 TABLET | Refills: 0 | Status: SHIPPED | OUTPATIENT
Start: 2024-02-21 | End: 2024-08-30

## 2024-02-22 ENCOUNTER — E-VISIT (OUTPATIENT)
Dept: FAMILY MEDICINE | Facility: OTHER | Age: 54
End: 2024-02-22
Attending: FAMILY MEDICINE
Payer: MEDICARE

## 2024-02-22 DIAGNOSIS — K13.70 ORAL LESION: Primary | ICD-10-CM

## 2024-02-22 LAB — VIT D+METAB SERPL-MCNC: 8 NG/ML (ref 20–50)

## 2024-02-22 NOTE — PATIENT INSTRUCTIONS
Dear Selma Dubose?     After reviewing your responses, I am unable to make a diagnosis that can be treated online.    You will not be charged for this eVisit.     As this is not healing, this does need to be seen in person, ideally by a dental provider.  This might also need a biopsy, which would be completed by an Ear/Nose/Throat provider.  If you cannot get in to see a dental provider, please let us know and we will help coordinate an appointment with ENT.  I am leery of more antibiotics, they can cause other problems, like a pretty serious stool infection.    Thanks for choosing?us?as your health care partner,?   ?   Paz Maria MD?

## 2024-02-23 DIAGNOSIS — M54.42 CHRONIC BILATERAL LOW BACK PAIN WITH BILATERAL SCIATICA: ICD-10-CM

## 2024-02-23 DIAGNOSIS — M54.41 CHRONIC BILATERAL LOW BACK PAIN WITH BILATERAL SCIATICA: ICD-10-CM

## 2024-02-23 DIAGNOSIS — G89.29 CHRONIC BILATERAL LOW BACK PAIN WITH BILATERAL SCIATICA: ICD-10-CM

## 2024-02-26 RX ORDER — IBUPROFEN 600 MG/1
TABLET, FILM COATED ORAL
Qty: 90 TABLET | Refills: 0 | Status: SHIPPED | OUTPATIENT
Start: 2024-02-26 | End: 2024-04-04

## 2024-02-26 NOTE — TELEPHONE ENCOUNTER
Ibuprofen      Last Written Prescription Date:  1/29/24  Last Fill Quantity: 90,   # refills: 0  Last Office Visit: 2/20/24  Future Office visit:    Next 5 appointments (look out 90 days)      Mar 21, 2024  3:30 PM  (Arrive by 3:15 PM)  SHORT with Kayleen Ugarte NP  [facility] [place] ([facility] [place] ) [address]  [place] [address]  [phone]             Routing refill request to provider for review/approval because:

## 2024-02-27 DIAGNOSIS — E66.01 MORBID OBESITY (H): ICD-10-CM

## 2024-02-27 NOTE — TELEPHONE ENCOUNTER
Wegovy      Last Written Prescription Date:  02/20/24 to [facility]  Last Fill Quantity: 2ml,   # refills: 0  Last Office Visit: 02/22/24  Future Office visit:    Next 5 appointments (look out 90 days)      Mar 21, 2024  3:30 PM  (Arrive by 3:15 PM)  SHORT with Kayleen Ugarte NP  [facility] [place] ([facility] [place] ) [address]  [place] [address]  [phone]

## 2024-02-27 NOTE — TELEPHONE ENCOUNTER
Routing refill request to provider for review/approval because:    Drug not on the [facility], [facility] or [facility] refill protocol or controlled substance

## 2024-03-04 DIAGNOSIS — F41.9 ANXIETY: ICD-10-CM

## 2024-03-04 NOTE — TELEPHONE ENCOUNTER
Xanax       Last Written Prescription Date:  9/14/2023  Last Fill Quantity: 90,   # refills: 5  Last Office Visit: 2/20/2024  Future Office visit:    Next 5 appointments (look out 90 days)      Mar 21, 2024  3:30 PM  (Arrive by 3:15 PM)  SHORT with Kayleen Ugarte NP  [facility] [place] ([facility] [place] ) [address]  [place] [address]  [phone]

## 2024-03-05 RX ORDER — ALPRAZOLAM 0.25 MG
0.25 TABLET ORAL 3 TIMES DAILY PRN
Qty: 90 TABLET | Refills: 5 | Status: SHIPPED | OUTPATIENT
Start: 2024-03-05 | End: 2024-08-30

## 2024-04-03 DIAGNOSIS — G89.29 CHRONIC BILATERAL LOW BACK PAIN WITH BILATERAL SCIATICA: ICD-10-CM

## 2024-04-03 DIAGNOSIS — M54.41 CHRONIC BILATERAL LOW BACK PAIN WITH BILATERAL SCIATICA: ICD-10-CM

## 2024-04-03 DIAGNOSIS — M54.42 CHRONIC BILATERAL LOW BACK PAIN WITH BILATERAL SCIATICA: ICD-10-CM

## 2024-04-03 NOTE — TELEPHONE ENCOUNTER
Ibuprofen 600 MG Oral Tablet         Last Written Prescription Date:  2/26/24  Last Fill Quantity: 90,   # refills: 0  Last Office Visit: 2/22/24  Future Office visit:       Routing refill request to provider for review/approval because:    NSAID Medications [id number]/03/2024 08:12 AM   Protocol Details Blood pressure under 140/90 in past 12 months    Normal ALT on file in past 12 months    Normal AST on file in past 12 months    Always Fail Criteria - Chart Review Required       BP Readings from Last 3 Encounters:   02/20/24 (!) 160/80   09/14/23 (!) 152/80   02/13/23 138/88     Lab Results   Component Value Date    ALT 46 02/13/2023    ALT 62 08/14/2017     AST   Date Value Ref Range Status   08/14/2017 29 0 - 45 U/L Final

## 2024-04-04 RX ORDER — IBUPROFEN 600 MG/1
TABLET, FILM COATED ORAL
Qty: 90 TABLET | Refills: 1 | Status: SHIPPED | OUTPATIENT
Start: 2024-04-04 | End: 2024-07-30

## 2024-04-10 ENCOUNTER — TELEPHONE (OUTPATIENT)
Dept: FAMILY MEDICINE | Facility: OTHER | Age: 54
End: 2024-04-10

## 2024-04-10 ENCOUNTER — MYC REFILL (OUTPATIENT)
Dept: FAMILY MEDICINE | Facility: OTHER | Age: 54
End: 2024-04-10

## 2024-04-10 DIAGNOSIS — R73.9 HYPERGLYCEMIA: ICD-10-CM

## 2024-04-10 DIAGNOSIS — E66.01 MORBID OBESITY (H): ICD-10-CM

## 2024-04-10 DIAGNOSIS — E66.01 MORBID OBESITY (H): Primary | ICD-10-CM

## 2024-04-10 NOTE — TELEPHONE ENCOUNTER
Received a PA request from [facility] for Semaglutide-Weight Management (WEGOVY) 0.25 MG/0.5ML pen. Submitted on CMM. Waiting for a response.

## 2024-04-10 NOTE — TELEPHONE ENCOUNTER
Patient wanting to try to get medication from [facility] pharmacy due to being out of stock at other pharmacies.      Wegovy 0.25 MG/0.5 ML      Last Written Prescription Date:  02/27/24  Last Fill Quantity: 2,   # refills: 0  Last Office Visit: 02/20/24  Future Office visit:       Routing refill request to provider for review/approval because:  Drug not on the [facility], [facility] or [facility] refill protocol or controlled substance

## 2024-04-11 NOTE — TELEPHONE ENCOUNTER
Received a DENIAL from [facility] for Semaglutide-Weight Management (WEGOVY) 0.25 MG/0.5ML pen.           Scanned in Epic.

## 2024-04-15 RX ORDER — TIRZEPATIDE 2.5 MG/.5ML
2.5 INJECTION, SOLUTION SUBCUTANEOUS
Qty: 1 ML | Refills: 0 | Status: SHIPPED | OUTPATIENT
Start: 2024-04-15 | End: 2024-08-30

## 2024-04-16 ENCOUNTER — TELEPHONE (OUTPATIENT)
Dept: FAMILY MEDICINE | Facility: OTHER | Age: 54
End: 2024-04-16

## 2024-04-16 NOTE — TELEPHONE ENCOUNTER
Received a PA request from [facility] for tirzepatide (MOUNJARO) 2.5 MG/0.5ML pen. Submitted on CMM. Waiting for a response.

## 2024-04-16 NOTE — TELEPHONE ENCOUNTER
Received a DENIAL from [facility] for tirzepatide (MOUNJARO) 2.5 MG/0.5ML pen.             Scanned in Epic.

## 2024-07-02 ENCOUNTER — TELEPHONE (OUTPATIENT)
Dept: FAMILY MEDICINE | Facility: OTHER | Age: 54
End: 2024-07-02

## 2024-07-26 DIAGNOSIS — G89.29 CHRONIC BILATERAL LOW BACK PAIN WITH BILATERAL SCIATICA: ICD-10-CM

## 2024-07-26 DIAGNOSIS — M54.42 CHRONIC BILATERAL LOW BACK PAIN WITH BILATERAL SCIATICA: ICD-10-CM

## 2024-07-26 DIAGNOSIS — M54.41 CHRONIC BILATERAL LOW BACK PAIN WITH BILATERAL SCIATICA: ICD-10-CM

## 2024-07-29 NOTE — TELEPHONE ENCOUNTER
Ibuprofen      Last Written Prescription Date:  2/26/24  Last Fill Quantity: 90,   # refills: 0  Last Office Visit: 2/20/24  Future Office visit:       Routing refill request to provider for review/approval because:

## 2024-07-30 RX ORDER — IBUPROFEN 600 MG/1
TABLET, FILM COATED ORAL
Qty: 90 TABLET | Refills: 0 | Status: SHIPPED | OUTPATIENT
Start: 2024-07-30 | End: 2024-08-30

## 2024-08-27 NOTE — PROGRESS NOTES
Assessment & Plan     1. Moderate recurrent major depression (H)  No changes to current medication, will refill Xanax when due.  We have discussed options, patient does not wish to change.    2. Anxiety  As above.  - ALPRAZolam (XANAX) 0.25 MG tablet; Take 1 tablet (0.25 mg) by mouth 3 times daily as needed for anxiety.  Dispense: 90 tablet; Refill: 5    3. Moderate persistent asthma without complication  No changes, symptoms overall controlled    4. Hyperlipidemia, unspecified hyperlipidemia type  Will make recommendations based on results    5. Hypothyroidism, unspecified type  Labs updated, will adjust medication as needed.  - TSH with free T4 reflex; Future  - TSH with free T4 reflex  - T4 free    6. Chronic bilateral low back pain with bilateral sciatica  Refilled  - ibuprofen (ADVIL/MOTRIN) 600 MG tablet; Take 1 tablet (600 mg) by mouth every 8 hours as needed for moderate pain.  Dispense: 90 tablet; Refill: 0    7. Osteoarthritis of spine with radiculopathy, lumbar region  Refilled  - cyclobenzaprine (FLEXERIL) 10 MG tablet; Take 1 tablet (10 mg) by mouth 2 times daily as needed for muscle spasms.  Dispense: 60 tablet; Refill: 3    8. Elevated glucose  Labs updated.  - Basic metabolic panel; Future  - Hemoglobin A1c; Future  - Basic metabolic panel  - Hemoglobin A1c       The longitudinal plan of care for the diagnosis(es)/condition(s) as documented were addressed during this visit. Due to the added complexity in care, I will continue to support Ginger in the subsequent management and with ongoing continuity of care.      Nicotine/Tobacco Cessation  She reports that she has been smoking cigarettes. She has a 24 pack-year smoking history. She has been exposed to tobacco smoke. She has never used smokeless tobacco.  Nicotine/Tobacco Cessation Plan  Information offered: Patient not interested at this time      BMI  Estimated body mass index is 52.79 kg/m  as calculated from the following:    Height as of  "9/14/23: 1.626 m (5' 4\").    Weight as of this encounter: 139.5 kg (307 lb 8.7 oz).   Weight management plan: Discussed healthy diet and exercise guidelines Medications have been discussed previously, they have been cost prohibitive.      Return in about 6 months (around 2/28/2025) for Chronic Disease Management, Medication review.      Jose Alberto Hahn is a 53 year old, presenting for the following health issues:  Asthma, Anxiety, Depression, and Recheck Medication        8/30/2024     3:21 PM   Additional Questions   Roomed by Violeta CARRANZA   Accompanied by None     HPI     Depression and Anxiety   How are you doing with your depression since your last visit? Worsened   How are you doing with your anxiety since your last visit?  No change  Are you having other symptoms that might be associated with depression or anxiety? No  Have you had a significant life event? Grief or Loss   Do you have any concerns with your use of alcohol or other drugs? No    Social History     Tobacco Use    Smoking status: Every Day     Current packs/day: 1.00     Average packs/day: 1 pack/day for 24.0 years (24.0 ttl pk-yrs)     Types: Cigarettes     Passive exposure: Current    Smokeless tobacco: Never    Tobacco comments:     Tried to Quit (NO); Passive Exposure (NO)   Vaping Use    Vaping status: Never Used   Substance Use Topics    Alcohol use: Yes     Comment: 3 Beers - WEEKLY    Drug use: Never         9/14/2023     7:33 AM 2/19/2024     8:29 PM 8/30/2024     3:20 PM   PHQ   PHQ-9 Total Score 8 10 10   Q9: Thoughts of better off dead/self-harm past 2 weeks Not at all Not at all Not at all         9/14/2023     7:34 AM 2/19/2024     8:30 PM 8/30/2024     3:21 PM   LIZETT-7 SCORE   Total Score 4 (minimal anxiety) 4 (minimal anxiety) 6 (mild anxiety)   Total Score 4 4 6         8/30/2024     3:20 PM   Last PHQ-9   1.  Little interest or pleasure in doing things 2   2.  Feeling down, depressed, or hopeless 2   3.  Trouble falling or staying " asleep, or sleeping too much 2   4.  Feeling tired or having little energy 2   5.  Poor appetite or overeating 0   6.  Feeling bad about yourself 1   7.  Trouble concentrating 1   8.  Moving slowly or restless 0   Q9: Thoughts of better off dead/self-harm past 2 weeks 0   PHQ-9 Total Score 10         8/30/2024     3:21 PM   LIZETT-7    1. Feeling nervous, anxious, or on edge 1   2. Not being able to stop or control worrying 1   3. Worrying too much about different things 1   4. Trouble relaxing 1   5. Being so restless that it is hard to sit still 0   6. Becoming easily annoyed or irritable 2   7. Feeling afraid, as if something awful might happen 0   LIZETT-7 Total Score 6   If you checked any problems, how difficult have they made it for you to do your work, take care of things at home, or get along with other people? Somewhat difficult       Suicide Assessment Five-step Evaluation and Treatment (SAFE-T)    Asthma Follow-Up    Was ACT completed today?  Yes        8/28/2024    10:56 AM   ACT Total Scores   ACT TOTAL SCORE (Goal Greater than or Equal to 20) 23   In the past 12 months, how many times did you visit the emergency room for your asthma without being admitted to the hospital? 0   In the past 12 months, how many times were you hospitalized overnight because of your asthma? 0        How many days per week do you miss taking your asthma controller medication?  0  Please describe any recent triggers for your asthma: smoke, mold, humidity, and strong odors and fumes  Have you had any Emergency Room Visits, Urgent Care Visits, or Hospital Admissions since your last office visit?  No    Hyperlipidemia Follow-Up    Are you regularly taking any medication or supplement to lower your cholesterol?   No  Are you having muscle aches or other side effects that you think could be caused by your cholesterol lowering medication?  No    Hypothyroidism Follow-up    Since last visit, patient describes the following symptoms:  Weight stable, no hair loss, no skin changes, no constipation, no loose stools          Review of Systems  Constitutional, HEENT, cardiovascular, pulmonary, gi and gu systems are negative, except as otherwise noted.      Objective    /86 (BP Location: Right arm, Patient Position: Sitting, Cuff Size: Adult Large)   Pulse 81   Temp 98.6  F (37  C) (Tympanic)   Resp 18   Wt 139.5 kg (307 lb 8.7 oz)   SpO2 92%   Breastfeeding No   BMI 52.79 kg/m    Body mass index is 52.79 kg/m .  Physical Exam   GENERAL: alert and no distress  PSYCH: mentation appears normal, affect normal/bright          Signed Electronically by: Paz Maria MD

## 2024-08-28 ASSESSMENT — ASTHMA QUESTIONNAIRES
QUESTION_1 LAST FOUR WEEKS HOW MUCH OF THE TIME DID YOUR ASTHMA KEEP YOU FROM GETTING AS MUCH DONE AT WORK, SCHOOL OR AT HOME: NONE OF THE TIME
ACT_TOTALSCORE: 23
ACT_TOTALSCORE: 23
QUESTION_2 LAST FOUR WEEKS HOW OFTEN HAVE YOU HAD SHORTNESS OF BREATH: ONCE OR TWICE A WEEK
QUESTION_4 LAST FOUR WEEKS HOW OFTEN HAVE YOU USED YOUR RESCUE INHALER OR NEBULIZER MEDICATION (SUCH AS ALBUTEROL): ONCE A WEEK OR LESS
QUESTION_5 LAST FOUR WEEKS HOW WOULD YOU RATE YOUR ASTHMA CONTROL: COMPLETELY CONTROLLED
QUESTION_3 LAST FOUR WEEKS HOW OFTEN DID YOUR ASTHMA SYMPTOMS (WHEEZING, COUGHING, SHORTNESS OF BREATH, CHEST TIGHTNESS OR PAIN) WAKE YOU UP AT NIGHT OR EARLIER THAN USUAL IN THE MORNING: NOT AT ALL

## 2024-08-30 ENCOUNTER — OFFICE VISIT (OUTPATIENT)
Dept: FAMILY MEDICINE | Facility: OTHER | Age: 54
End: 2024-08-30
Attending: FAMILY MEDICINE
Payer: COMMERCIAL

## 2024-08-30 VITALS
RESPIRATION RATE: 18 BRPM | HEART RATE: 81 BPM | BODY MASS INDEX: 52.79 KG/M2 | DIASTOLIC BLOOD PRESSURE: 86 MMHG | WEIGHT: 293 LBS | TEMPERATURE: 98.6 F | SYSTOLIC BLOOD PRESSURE: 138 MMHG | OXYGEN SATURATION: 92 %

## 2024-08-30 DIAGNOSIS — J45.40 MODERATE PERSISTENT ASTHMA WITHOUT COMPLICATION: ICD-10-CM

## 2024-08-30 DIAGNOSIS — M54.41 CHRONIC BILATERAL LOW BACK PAIN WITH BILATERAL SCIATICA: ICD-10-CM

## 2024-08-30 DIAGNOSIS — E78.5 HYPERLIPIDEMIA, UNSPECIFIED HYPERLIPIDEMIA TYPE: ICD-10-CM

## 2024-08-30 DIAGNOSIS — G89.29 CHRONIC BILATERAL LOW BACK PAIN WITH BILATERAL SCIATICA: ICD-10-CM

## 2024-08-30 DIAGNOSIS — M54.42 CHRONIC BILATERAL LOW BACK PAIN WITH BILATERAL SCIATICA: ICD-10-CM

## 2024-08-30 DIAGNOSIS — E03.9 HYPOTHYROIDISM, UNSPECIFIED TYPE: ICD-10-CM

## 2024-08-30 DIAGNOSIS — R73.09 ELEVATED GLUCOSE: ICD-10-CM

## 2024-08-30 DIAGNOSIS — F33.1 MODERATE RECURRENT MAJOR DEPRESSION (H): Primary | ICD-10-CM

## 2024-08-30 DIAGNOSIS — F41.9 ANXIETY: ICD-10-CM

## 2024-08-30 DIAGNOSIS — M47.26 OSTEOARTHRITIS OF SPINE WITH RADICULOPATHY, LUMBAR REGION: ICD-10-CM

## 2024-08-30 LAB
ANION GAP SERPL CALCULATED.3IONS-SCNC: 15 MMOL/L (ref 7–15)
BUN SERPL-MCNC: 7.6 MG/DL (ref 6–20)
CALCIUM SERPL-MCNC: 9.5 MG/DL (ref 8.8–10.4)
CHLORIDE SERPL-SCNC: 99 MMOL/L (ref 98–107)
CREAT SERPL-MCNC: 0.58 MG/DL (ref 0.51–0.95)
EGFRCR SERPLBLD CKD-EPI 2021: >90 ML/MIN/1.73M2
EST. AVERAGE GLUCOSE BLD GHB EST-MCNC: 123 MG/DL
GLUCOSE SERPL-MCNC: 105 MG/DL (ref 70–99)
HBA1C MFR BLD: 5.9 %
HCO3 SERPL-SCNC: 24 MMOL/L (ref 22–29)
POTASSIUM SERPL-SCNC: 4.1 MMOL/L (ref 3.4–5.3)
SODIUM SERPL-SCNC: 138 MMOL/L (ref 135–145)
T4 FREE SERPL-MCNC: 0.85 NG/DL (ref 0.9–1.7)
TSH SERPL DL<=0.005 MIU/L-ACNC: 6.89 UIU/ML (ref 0.3–4.2)

## 2024-08-30 PROCEDURE — 83036 HEMOGLOBIN GLYCOSYLATED A1C: CPT | Mod: ZL | Performed by: FAMILY MEDICINE

## 2024-08-30 PROCEDURE — 84439 ASSAY OF FREE THYROXINE: CPT | Mod: ZL | Performed by: FAMILY MEDICINE

## 2024-08-30 PROCEDURE — 36415 COLL VENOUS BLD VENIPUNCTURE: CPT | Mod: ZL | Performed by: FAMILY MEDICINE

## 2024-08-30 PROCEDURE — 99214 OFFICE O/P EST MOD 30 MIN: CPT | Performed by: FAMILY MEDICINE

## 2024-08-30 PROCEDURE — G0463 HOSPITAL OUTPT CLINIC VISIT: HCPCS

## 2024-08-30 PROCEDURE — G2211 COMPLEX E/M VISIT ADD ON: HCPCS | Performed by: FAMILY MEDICINE

## 2024-08-30 PROCEDURE — 84443 ASSAY THYROID STIM HORMONE: CPT | Mod: ZL | Performed by: FAMILY MEDICINE

## 2024-08-30 PROCEDURE — 80048 BASIC METABOLIC PNL TOTAL CA: CPT | Mod: ZL | Performed by: FAMILY MEDICINE

## 2024-08-30 RX ORDER — IBUPROFEN 600 MG/1
600 TABLET, FILM COATED ORAL EVERY 8 HOURS PRN
Qty: 90 TABLET | Refills: 0 | Status: SHIPPED | OUTPATIENT
Start: 2024-08-30 | End: 2024-09-30

## 2024-08-30 RX ORDER — CYCLOBENZAPRINE HCL 10 MG
10 TABLET ORAL 2 TIMES DAILY PRN
Qty: 60 TABLET | Refills: 3 | Status: SHIPPED | OUTPATIENT
Start: 2024-08-30

## 2024-08-30 RX ORDER — ALPRAZOLAM 0.25 MG
0.25 TABLET ORAL 3 TIMES DAILY PRN
Qty: 90 TABLET | Refills: 5 | Status: SHIPPED | OUTPATIENT
Start: 2024-08-30

## 2024-08-30 RX ORDER — LEVOTHYROXINE SODIUM 150 UG/1
150 TABLET ORAL DAILY
Qty: 90 TABLET | Refills: 1 | Status: SHIPPED | OUTPATIENT
Start: 2024-08-30 | End: 2024-09-05

## 2024-08-30 ASSESSMENT — PATIENT HEALTH QUESTIONNAIRE - PHQ9
10. IF YOU CHECKED OFF ANY PROBLEMS, HOW DIFFICULT HAVE THESE PROBLEMS MADE IT FOR YOU TO DO YOUR WORK, TAKE CARE OF THINGS AT HOME, OR GET ALONG WITH OTHER PEOPLE: SOMEWHAT DIFFICULT
SUM OF ALL RESPONSES TO PHQ QUESTIONS 1-9: 10
SUM OF ALL RESPONSES TO PHQ QUESTIONS 1-9: 10

## 2024-08-30 ASSESSMENT — ANXIETY QUESTIONNAIRES
GAD7 TOTAL SCORE: 6
7. FEELING AFRAID AS IF SOMETHING AWFUL MIGHT HAPPEN: NOT AT ALL
8. IF YOU CHECKED OFF ANY PROBLEMS, HOW DIFFICULT HAVE THESE MADE IT FOR YOU TO DO YOUR WORK, TAKE CARE OF THINGS AT HOME, OR GET ALONG WITH OTHER PEOPLE?: SOMEWHAT DIFFICULT
GAD7 TOTAL SCORE: 6
GAD7 TOTAL SCORE: 6

## 2024-08-30 ASSESSMENT — PAIN SCALES - GENERAL: PAINLEVEL: SEVERE PAIN (7)

## 2024-09-05 DIAGNOSIS — E03.9 HYPOTHYROIDISM, UNSPECIFIED TYPE: ICD-10-CM

## 2024-09-05 RX ORDER — LEVOTHYROXINE SODIUM 175 UG/1
175 TABLET ORAL DAILY
Qty: 90 TABLET | Refills: 1 | Status: SHIPPED | OUTPATIENT
Start: 2024-09-05

## 2024-09-30 DIAGNOSIS — M54.42 CHRONIC BILATERAL LOW BACK PAIN WITH BILATERAL SCIATICA: ICD-10-CM

## 2024-09-30 DIAGNOSIS — M54.41 CHRONIC BILATERAL LOW BACK PAIN WITH BILATERAL SCIATICA: ICD-10-CM

## 2024-09-30 DIAGNOSIS — G89.29 CHRONIC BILATERAL LOW BACK PAIN WITH BILATERAL SCIATICA: ICD-10-CM

## 2024-09-30 RX ORDER — IBUPROFEN 600 MG/1
600 TABLET, FILM COATED ORAL EVERY 8 HOURS PRN
Qty: 90 TABLET | Refills: 0 | Status: SHIPPED | OUTPATIENT
Start: 2024-09-30

## 2024-09-30 NOTE — TELEPHONE ENCOUNTER
Ibuprofen      Last Written Prescription Date:  8.30.24  Last Fill Quantity: #90,   # refills: 0  Last Office Visit: 8.30.24  Future Office visit:       Routing refill request to provider for review/approval because:

## 2024-11-03 DIAGNOSIS — Z87.898 HISTORY OF TACHYCARDIA: ICD-10-CM

## 2024-11-04 RX ORDER — DILTIAZEM HYDROCHLORIDE 240 MG/1
240 CAPSULE, EXTENDED RELEASE ORAL DAILY
Qty: 90 CAPSULE | Refills: 2 | Status: SHIPPED | OUTPATIENT
Start: 2024-11-04

## 2024-12-30 DIAGNOSIS — M54.42 CHRONIC BILATERAL LOW BACK PAIN WITH BILATERAL SCIATICA: ICD-10-CM

## 2024-12-30 DIAGNOSIS — M54.41 CHRONIC BILATERAL LOW BACK PAIN WITH BILATERAL SCIATICA: ICD-10-CM

## 2024-12-30 DIAGNOSIS — G89.29 CHRONIC BILATERAL LOW BACK PAIN WITH BILATERAL SCIATICA: ICD-10-CM

## 2024-12-31 RX ORDER — IBUPROFEN 600 MG/1
600 TABLET, FILM COATED ORAL EVERY 8 HOURS PRN
Qty: 90 TABLET | Refills: 0 | Status: SHIPPED | OUTPATIENT
Start: 2024-12-31

## 2024-12-31 NOTE — TELEPHONE ENCOUNTER
Ibuprofen      Last Written Prescription Date:  11/29/24  Last Fill Quantity: 90,   # refills: 0  Last Office Visit: 8/30/24  Future Office visit:       Routing refill request to provider for review/approval because:

## 2025-02-24 DIAGNOSIS — M54.41 CHRONIC BILATERAL LOW BACK PAIN WITH BILATERAL SCIATICA: ICD-10-CM

## 2025-02-24 DIAGNOSIS — G89.29 CHRONIC BILATERAL LOW BACK PAIN WITH BILATERAL SCIATICA: ICD-10-CM

## 2025-02-24 DIAGNOSIS — M54.42 CHRONIC BILATERAL LOW BACK PAIN WITH BILATERAL SCIATICA: ICD-10-CM

## 2025-02-24 NOTE — TELEPHONE ENCOUNTER
Ibuprofen       Last Written Prescription Date:  12/31/2024  Last Fill Quantity: 90,   # refills: 0  Last Office Visit: 8/30/2024  Future Office visit:

## 2025-02-25 NOTE — TELEPHONE ENCOUNTER
NSAID Medications [id number]/24/2025 10:31 AM   Protocol Details Normal CBC on file in past 12 months    Always Fail Criteria - Chart Review Required          CBC RESULTS:   Recent Labs   Lab Test 09/14/23  1024   WBC 9.6   RBC 4.68   HGB 14.9   HCT 44.4   MCV 95   MCH 31.8   MCHC 33.6   RDW 13.1

## 2025-02-25 NOTE — TELEPHONE ENCOUNTER
Attempt # 1  Outcome: Left Message   Comment: LVM for patient to call to schedule a med review with Dr. Ramsey.

## 2025-02-26 NOTE — TELEPHONE ENCOUNTER
Attempt # 2  Outcome: Left Message   Comment: LVM for pt to call and schedule med review w/Dr. Ramsey.

## 2025-02-27 RX ORDER — IBUPROFEN 600 MG/1
600 TABLET, FILM COATED ORAL EVERY 8 HOURS PRN
Qty: 90 TABLET | Refills: 0 | Status: SHIPPED | OUTPATIENT
Start: 2025-02-27

## 2025-05-13 DIAGNOSIS — M54.41 CHRONIC BILATERAL LOW BACK PAIN WITH BILATERAL SCIATICA: ICD-10-CM

## 2025-05-13 DIAGNOSIS — F41.9 ANXIETY: ICD-10-CM

## 2025-05-13 DIAGNOSIS — G89.29 CHRONIC BILATERAL LOW BACK PAIN WITH BILATERAL SCIATICA: ICD-10-CM

## 2025-05-13 DIAGNOSIS — M54.42 CHRONIC BILATERAL LOW BACK PAIN WITH BILATERAL SCIATICA: ICD-10-CM

## 2025-05-14 RX ORDER — IBUPROFEN 600 MG/1
600 TABLET, FILM COATED ORAL EVERY 8 HOURS PRN
Qty: 90 TABLET | Refills: 0 | Status: SHIPPED | OUTPATIENT
Start: 2025-05-14

## 2025-05-14 RX ORDER — ALPRAZOLAM 0.25 MG
0.25 TABLET ORAL 3 TIMES DAILY PRN
Qty: 90 TABLET | Refills: 0 | Status: SHIPPED | OUTPATIENT
Start: 2025-05-14

## 2025-05-14 NOTE — TELEPHONE ENCOUNTER
ALPRAZOLAM 0.25MG   TAB         Last Written Prescription Date:  4/11/25  Last Fill Quantity: 90,   # refills: 0  Last Office Visit: 8/30/24  Future Office visit:    Next 5 appointments (look out 90 days)      May 20, 2025 2:00 PM  (Arrive by 1:45 PM)  Provider Visit with Paz Maria MD  [facility] ([facility] ) [address]  [place] [address]  [phone]             Routing refill request to provider for review/approval because:    Rx Protocol Controlled Substance [id number]/13/2025 05:53 PM   Protocol Details Visit with relevant provider in past 3 months or upcoming 3 months    Urine drug screeen results on file in past 12 months    Controlled Substance Agreement on file in last 12 months    Auto Fail - Please forward to Provider       Ibuprofen 600 MG Oral Tablet         Last Written Prescription Date:  4/11/25  Last Fill Quantity: 90,   # refills: 0  Last Office Visit: 8/30/24  Future Office visit:    Next 5 appointments (look out 90 days)      May 20, 2025 2:00 PM  (Arrive by 1:45 PM)  Provider Visit with Paz Maria MD  [facility] ([facility] ) [address]  [place] [address]  [phone]             Routing refill request to provider for review/approval because:      NSAID Medications [id number]/13/2025 05:53 PM   Protocol Details Normal CBC on file in past 12 months    Always Fail Criteria - Chart Review Required          CBC RESULTS:   Recent Labs   Lab Test 09/14/23  1024   WBC 9.6   RBC 4.68   HGB 14.9   HCT 44.4   MCV 95   MCH 31.8   MCHC 33.6   RDW 13.1

## 2025-05-19 NOTE — PROGRESS NOTES
Assessment & Plan     1. Moderate recurrent major depression (H) (Primary)  No changes to current medication, will refill alprazolam when due.  Patient declines any further medication changes.  Follow-up in six months, sooner as needed.    2. Moderate persistent asthma without complication  No changes, will refill inhaler when needed    3. Acquired hypothyroidism  Labs updated, will refill medication once results available    4. Chronic pain of both knees  Encouraged increased activity as tolerated, likely in the pool would be best.  Otherwise, Orthopedics referral ordered.  Follow-up as directed  - Orthopedic  Referral; Future    5. Boil, face  Refilled for intermittent use  - clindamycin (CLEOCIN T) 1 % external lotion; Apply topically 2 times daily.  Dispense: 60 mL; Refill: 1    6. Hypothyroidism, unspecified type  - TSH with free T4 reflex         The longitudinal plan of care for the diagnosis(es)/condition(s) as documented were addressed during this visit. Due to the added complexity in care, I will continue to support Ginger in the subsequent management and with ongoing continuity of care.     Follow-up  Return in about 6 months (around 11/20/2025) for Chronic Disease Management, Medication review.    Jose Alberto Hahn is a 54 year old, presenting for the following health issues:  Depression, Anxiety, Asthma, and Thyroid Problem          Depression and Anxiety   How are you doing with your depression since your last visit? Worsened health concerns   How are you doing with your anxiety since your last visit?  Worsened pain all day long   Are you having other symptoms that might be associated with depression or anxiety? No  Have you had a significant life event? Health Concerns   Do you have any concerns with your use of alcohol or other drugs? No  Patient does not want to change any medications at this time, she feels mood is more situational due to pain levels and difficulty with  activity    Social History     Tobacco Use    Smoking status: Every Day     Current packs/day: 0.50     Average packs/day: 0.7 packs/day for 59.4 years (41.7 ttl pk-yrs)     Types: Cigarettes     Start date: 1/1/1990     Passive exposure: Current    Smokeless tobacco: Never    Tobacco comments:     Tried to Quit (NO); Passive Exposure (NO)   Vaping Use    Vaping status: Never Used   Substance Use Topics    Alcohol use: Yes     Comment: 3 Beers - WEEKLY    Drug use: Never         8/30/2024     3:20 PM 4/10/2025    12:41 PM 5/20/2025     7:26 AM   PHQ   PHQ-9 Total Score 10 8  13    Q9: Thoughts of better off dead/self-harm past 2 weeks Not at all Not at all Not at all       Patient-reported         8/30/2024     3:21 PM 4/10/2025    12:42 PM 5/20/2025     7:27 AM   LIZETT-7 SCORE   Total Score 6 (mild anxiety) 5 (mild anxiety) 3 (minimal anxiety)   Total Score 6 5  3        Patient-reported         5/20/2025     7:26 AM   Last PHQ-9   1.  Little interest or pleasure in doing things 2   2.  Feeling down, depressed, or hopeless 2   3.  Trouble falling or staying asleep, or sleeping too much 2   4.  Feeling tired or having little energy 3   5.  Poor appetite or overeating 1   6.  Feeling bad about yourself 2   7.  Trouble concentrating 1   8.  Moving slowly or restless 0   Q9: Thoughts of better off dead/self-harm past 2 weeks 0   PHQ-9 Total Score 13        Patient-reported         5/20/2025     7:27 AM   LIZETT-7    1. Feeling nervous, anxious, or on edge 1   2. Not being able to stop or control worrying 0   3. Worrying too much about different things 0   4. Trouble relaxing 0   5. Being so restless that it is hard to sit still 0   6. Becoming easily annoyed or irritable 2   7. Feeling afraid, as if something awful might happen 0   LIZETT-7 Total Score 3    If you checked any problems, how difficult have they made it for you to do your work, take care of things at home, or get along with other people? Not difficult at all     "   Patient-reported       Suicide Assessment Five-step Evaluation and Treatment (SAFE-T)    Asthma  Patient has not had an ACT done in this encounter: Link to ACT Flowsheet       5/20/2025     7:30 AM   ACT Total Scores   ACT TOTAL SCORE (Goal Greater than or Equal to 20) 21    In the past 12 months, how many times did you visit the emergency room for your asthma without being admitted to the hospital? 0   In the past 12 months, how many times were you hospitalized overnight because of your asthma? 0       Patient-reported     Do you have any of the following symptoms? None of these symptoms (cough/noisy breathing/trouble with breathing)  What makes your asthma/breathing worse?  Pollens, Mold, Strong odors and fumes, and Exercise or sports  Do you want more information about how to use your inhaler? No  Hypothyroidism Follow-up    Since last visit, patient describes the following symptoms: dry skin, loose stools, and hair loss    Patient is having significant issues with her knees.  She states her knees are \"shot\" and limiting her ability to be active        Review of Systems  Constitutional, HEENT, cardiovascular, pulmonary, gi and gu systems are negative, except as otherwise noted.      Objective    BP (!) 154/82 (BP Location: Right arm, Patient Position: Sitting, Cuff Size: Adult Large)   Pulse 79   Temp 98  F (36.7  C) (Tympanic)   Resp 20   SpO2 93%   Breastfeeding No   There is no height or weight on file to calculate BMI.  Physical Exam   GENERAL: alert and no distress  PSYCH: mentation appears normal, affect normal/bright          Signed Electronically by: Paz Maria MD    "

## 2025-05-20 ENCOUNTER — OFFICE VISIT (OUTPATIENT)
Dept: FAMILY MEDICINE | Facility: OTHER | Age: 55
End: 2025-05-20
Attending: FAMILY MEDICINE
Payer: COMMERCIAL

## 2025-05-20 VITALS
DIASTOLIC BLOOD PRESSURE: 82 MMHG | RESPIRATION RATE: 20 BRPM | HEART RATE: 79 BPM | SYSTOLIC BLOOD PRESSURE: 154 MMHG | TEMPERATURE: 98 F | OXYGEN SATURATION: 93 %

## 2025-05-20 DIAGNOSIS — L02.02 BOIL, FACE: ICD-10-CM

## 2025-05-20 DIAGNOSIS — J45.40 MODERATE PERSISTENT ASTHMA WITHOUT COMPLICATION: ICD-10-CM

## 2025-05-20 DIAGNOSIS — F33.1 MODERATE RECURRENT MAJOR DEPRESSION (H): Primary | ICD-10-CM

## 2025-05-20 DIAGNOSIS — G89.29 CHRONIC PAIN OF BOTH KNEES: ICD-10-CM

## 2025-05-20 DIAGNOSIS — M25.562 CHRONIC PAIN OF BOTH KNEES: ICD-10-CM

## 2025-05-20 DIAGNOSIS — M25.561 CHRONIC PAIN OF BOTH KNEES: ICD-10-CM

## 2025-05-20 DIAGNOSIS — E03.9 ACQUIRED HYPOTHYROIDISM: ICD-10-CM

## 2025-05-20 DIAGNOSIS — E03.9 HYPOTHYROIDISM, UNSPECIFIED TYPE: ICD-10-CM

## 2025-05-20 LAB — TSH SERPL DL<=0.005 MIU/L-ACNC: 3.71 UIU/ML (ref 0.3–4.2)

## 2025-05-20 PROCEDURE — 84443 ASSAY THYROID STIM HORMONE: CPT | Mod: ZL | Performed by: FAMILY MEDICINE

## 2025-05-20 PROCEDURE — 36415 COLL VENOUS BLD VENIPUNCTURE: CPT | Mod: ZL | Performed by: FAMILY MEDICINE

## 2025-05-20 PROCEDURE — G0463 HOSPITAL OUTPT CLINIC VISIT: HCPCS

## 2025-05-20 RX ORDER — CLINDAMYCIN PHOSPHATE 10 UG/ML
LOTION TOPICAL 2 TIMES DAILY
Qty: 60 ML | Refills: 1 | Status: SHIPPED | OUTPATIENT
Start: 2025-05-20

## 2025-05-20 ASSESSMENT — ANXIETY QUESTIONNAIRES
GAD7 TOTAL SCORE: 3
5. BEING SO RESTLESS THAT IT IS HARD TO SIT STILL: NOT AT ALL
8. IF YOU CHECKED OFF ANY PROBLEMS, HOW DIFFICULT HAVE THESE MADE IT FOR YOU TO DO YOUR WORK, TAKE CARE OF THINGS AT HOME, OR GET ALONG WITH OTHER PEOPLE?: NOT DIFFICULT AT ALL
7. FEELING AFRAID AS IF SOMETHING AWFUL MIGHT HAPPEN: NOT AT ALL
4. TROUBLE RELAXING: NOT AT ALL
GAD7 TOTAL SCORE: 3
1. FEELING NERVOUS, ANXIOUS, OR ON EDGE: SEVERAL DAYS
GAD7 TOTAL SCORE: 3
2. NOT BEING ABLE TO STOP OR CONTROL WORRYING: NOT AT ALL
7. FEELING AFRAID AS IF SOMETHING AWFUL MIGHT HAPPEN: NOT AT ALL
IF YOU CHECKED OFF ANY PROBLEMS ON THIS QUESTIONNAIRE, HOW DIFFICULT HAVE THESE PROBLEMS MADE IT FOR YOU TO DO YOUR WORK, TAKE CARE OF THINGS AT HOME, OR GET ALONG WITH OTHER PEOPLE: NOT DIFFICULT AT ALL
3. WORRYING TOO MUCH ABOUT DIFFERENT THINGS: NOT AT ALL
6. BECOMING EASILY ANNOYED OR IRRITABLE: MORE THAN HALF THE DAYS

## 2025-05-20 ASSESSMENT — PAIN SCALES - GENERAL: PAINLEVEL_OUTOF10: SEVERE PAIN (7)

## 2025-05-20 ASSESSMENT — ASTHMA QUESTIONNAIRES
QUESTION_4 LAST FOUR WEEKS HOW OFTEN HAVE YOU USED YOUR RESCUE INHALER OR NEBULIZER MEDICATION (SUCH AS ALBUTEROL): TWO OR THREE TIMES PER WEEK
QUESTION_1 LAST FOUR WEEKS HOW MUCH OF THE TIME DID YOUR ASTHMA KEEP YOU FROM GETTING AS MUCH DONE AT WORK, SCHOOL OR AT HOME: NONE OF THE TIME
QUESTION_2 LAST FOUR WEEKS HOW OFTEN HAVE YOU HAD SHORTNESS OF BREATH: ONCE OR TWICE A WEEK
QUESTION_3 LAST FOUR WEEKS HOW OFTEN DID YOUR ASTHMA SYMPTOMS (WHEEZING, COUGHING, SHORTNESS OF BREATH, CHEST TIGHTNESS OR PAIN) WAKE YOU UP AT NIGHT OR EARLIER THAN USUAL IN THE MORNING: NOT AT ALL
QUESTION_5 LAST FOUR WEEKS HOW WOULD YOU RATE YOUR ASTHMA CONTROL: WELL CONTROLLED
ACT_TOTALSCORE: 21

## 2025-05-20 ASSESSMENT — PATIENT HEALTH QUESTIONNAIRE - PHQ9
SUM OF ALL RESPONSES TO PHQ QUESTIONS 1-9: 13
10. IF YOU CHECKED OFF ANY PROBLEMS, HOW DIFFICULT HAVE THESE PROBLEMS MADE IT FOR YOU TO DO YOUR WORK, TAKE CARE OF THINGS AT HOME, OR GET ALONG WITH OTHER PEOPLE: SOMEWHAT DIFFICULT
SUM OF ALL RESPONSES TO PHQ QUESTIONS 1-9: 13

## 2025-05-22 ENCOUNTER — RESULTS FOLLOW-UP (OUTPATIENT)
Dept: FAMILY MEDICINE | Facility: OTHER | Age: 55
End: 2025-05-22

## 2025-05-22 DIAGNOSIS — E03.9 HYPOTHYROIDISM, UNSPECIFIED TYPE: ICD-10-CM

## 2025-05-22 RX ORDER — LEVOTHYROXINE SODIUM 175 UG/1
175 TABLET ORAL DAILY
Qty: 90 TABLET | Refills: 3 | Status: SHIPPED | OUTPATIENT
Start: 2025-05-22

## 2025-06-16 ENCOUNTER — TRANSFERRED RECORDS (OUTPATIENT)
Dept: HEALTH INFORMATION MANAGEMENT | Facility: CLINIC | Age: 55
End: 2025-06-16

## 2025-07-07 ENCOUNTER — MEDICAL CORRESPONDENCE (OUTPATIENT)
Dept: HEALTH INFORMATION MANAGEMENT | Facility: HOSPITAL | Age: 55
End: 2025-07-07

## 2025-07-12 DIAGNOSIS — M54.41 CHRONIC BILATERAL LOW BACK PAIN WITH BILATERAL SCIATICA: ICD-10-CM

## 2025-07-12 DIAGNOSIS — G89.29 CHRONIC BILATERAL LOW BACK PAIN WITH BILATERAL SCIATICA: ICD-10-CM

## 2025-07-12 DIAGNOSIS — M54.42 CHRONIC BILATERAL LOW BACK PAIN WITH BILATERAL SCIATICA: ICD-10-CM

## 2025-07-14 RX ORDER — IBUPROFEN 600 MG/1
600 TABLET, FILM COATED ORAL EVERY 8 HOURS PRN
Qty: 90 TABLET | Refills: 1 | Status: SHIPPED | OUTPATIENT
Start: 2025-07-14

## 2025-07-14 NOTE — TELEPHONE ENCOUNTER
Ibuprofen 600 MG Oral Tablet         Last Written Prescription Date:  5/14/25  Last Fill Quantity: 90,   # refills: 0  Last Office Visit: 5/20/25  Future Office visit:    Next 5 appointments (look out 90 days)      Aug 01, 2025 3:30 PM  (Arrive by 3:15 PM)  Pre-Operative Physical with Paz Maria MD  [facility] ([facility] ) [address]  [facility] [address]  [phone]             Routing refill request to provider for review/approval because:      NSAID Medications [id number]/12/2025 11:13 PM   Protocol Details Most recent blood pressure under 140/90 in past 12 months    Normal CBC on file in past 12 months    Always Fail Criteria - Chart Review Required       BP Readings from Last 3 Encounters:   05/20/25 (!) 154/82   08/30/24 138/86   02/20/24 (!) 160/80       CBC RESULTS:   Recent Labs   Lab Test 09/14/23  1024   WBC 9.6   RBC 4.68   HGB 14.9   HCT 44.4   MCV 95   MCH 31.8   MCHC 33.6   RDW 13.1

## 2025-07-24 ENCOUNTER — TELEPHONE (OUTPATIENT)
Dept: PHYSICAL THERAPY | Facility: OTHER | Age: 55
End: 2025-07-24

## 2025-07-24 NOTE — TELEPHONE ENCOUNTER
4:29 PM    Reason for Call: Phone Call    Description: Call placed to patient to schedule physical therapy for patient. 1 prehab (prior to procedure), 1 postop (eval) and 4 treatments needed    Was an appointment offered for this call? No, no answer    Dayo Meza

## 2025-07-28 DIAGNOSIS — R06.02 SOB (SHORTNESS OF BREATH): ICD-10-CM

## 2025-07-28 DIAGNOSIS — F41.9 ANXIETY: ICD-10-CM

## 2025-07-28 RX ORDER — ALPRAZOLAM 0.25 MG
0.25 TABLET ORAL 3 TIMES DAILY PRN
Qty: 90 TABLET | Refills: 1 | Status: SHIPPED | OUTPATIENT
Start: 2025-07-28

## 2025-07-28 RX ORDER — ALBUTEROL SULFATE 90 UG/1
AEROSOL, METERED RESPIRATORY (INHALATION)
Qty: 18 G | Refills: 2 | Status: SHIPPED | OUTPATIENT
Start: 2025-07-28

## 2025-07-28 NOTE — TELEPHONE ENCOUNTER
ALPRAZOLAM 0.25MG   TAB       Last Written Prescription Date:  6/13/25  Last Fill Quantity: 90,   # refills: 0  Last Office Visit: 5/20/25  Future Office visit:    Next 5 appointments (look out 90 days)      Aug 01, 2025 3:30 PM  (Arrive by 3:15 PM)  Pre-Operative Physical with Paz Maria MD  [facility] ([facility] ) [address]  [facility] [place] [address]  [phone]             Routing refill request to provider for review/approval because:    Rx Protocol Controlled Substance [id number]/28/2025 09:28 AM   Protocol Details   Urine drug screeen results on file in past 12 months    Controlled Substance Agreement on file in last 12 months    Auto Fail - Please forward to Provider        No CSA or UDS on file